# Patient Record
Sex: FEMALE | Race: WHITE | NOT HISPANIC OR LATINO | Employment: OTHER | URBAN - METROPOLITAN AREA
[De-identification: names, ages, dates, MRNs, and addresses within clinical notes are randomized per-mention and may not be internally consistent; named-entity substitution may affect disease eponyms.]

---

## 2018-11-15 DIAGNOSIS — F02.80 LATE ONSET ALZHEIMER'S DISEASE WITHOUT BEHAVIORAL DISTURBANCE (HCC): Primary | ICD-10-CM

## 2018-11-15 DIAGNOSIS — G30.1 LATE ONSET ALZHEIMER'S DISEASE WITHOUT BEHAVIORAL DISTURBANCE (HCC): Primary | ICD-10-CM

## 2018-11-15 RX ORDER — MEMANTINE HYDROCHLORIDE 10 MG/1
10 TABLET ORAL 2 TIMES DAILY
Qty: 180 TABLET | Refills: 1 | Status: SHIPPED | OUTPATIENT
Start: 2018-11-15 | End: 2019-05-18 | Stop reason: SDUPTHER

## 2018-11-15 NOTE — TELEPHONE ENCOUNTER
Please check if patient has had any recent blood work done  Needs CBC and CMP given the medications that she is currently taking

## 2018-11-16 DIAGNOSIS — G30.1 LATE ONSET ALZHEIMER'S DISEASE WITHOUT BEHAVIORAL DISTURBANCE (HCC): Primary | ICD-10-CM

## 2018-11-16 DIAGNOSIS — F02.80 LATE ONSET ALZHEIMER'S DISEASE WITHOUT BEHAVIORAL DISTURBANCE (HCC): Primary | ICD-10-CM

## 2018-11-16 NOTE — TELEPHONE ENCOUNTER
Asking for script for blood work to be faxed to stephanie GODOY  [address] [place] [address] at [phone] and  Gwendolyn Mack will take her to have drawn  Please enter order into epic

## 2019-05-18 DIAGNOSIS — G30.1 LATE ONSET ALZHEIMER'S DISEASE WITHOUT BEHAVIORAL DISTURBANCE (HCC): ICD-10-CM

## 2019-05-18 DIAGNOSIS — F02.80 LATE ONSET ALZHEIMER'S DISEASE WITHOUT BEHAVIORAL DISTURBANCE (HCC): ICD-10-CM

## 2019-05-20 RX ORDER — MEMANTINE HYDROCHLORIDE 10 MG/1
TABLET ORAL
Qty: 180 TABLET | Refills: 1 | Status: ON HOLD | OUTPATIENT
Start: 2019-05-20 | End: 2019-11-12 | Stop reason: SDUPTHER

## 2019-10-22 ENCOUNTER — HOSPITAL ENCOUNTER (INPATIENT)
Facility: HOSPITAL | Age: 69
LOS: 22 days | Discharge: HOME | DRG: 885 | End: 2019-11-13
Attending: EMERGENCY MEDICINE | Admitting: PSYCHIATRY & NEUROLOGY
Payer: MEDICARE

## 2019-10-22 DIAGNOSIS — G30.1 LATE ONSET ALZHEIMER'S DISEASE WITHOUT BEHAVIORAL DISTURBANCE (HCC): ICD-10-CM

## 2019-10-22 DIAGNOSIS — K21.9 GASTROESOPHAGEAL REFLUX DISEASE WITHOUT ESOPHAGITIS: ICD-10-CM

## 2019-10-22 DIAGNOSIS — E53.8 B12 DEFICIENCY: ICD-10-CM

## 2019-10-22 DIAGNOSIS — E46 MALNUTRITION (HCC): ICD-10-CM

## 2019-10-22 DIAGNOSIS — F02.80 LATE ONSET ALZHEIMER'S DISEASE WITHOUT BEHAVIORAL DISTURBANCE (HCC): ICD-10-CM

## 2019-10-22 DIAGNOSIS — R63.4 WEIGHT LOSS: ICD-10-CM

## 2019-10-22 DIAGNOSIS — F32.3 MDD (MAJOR DEPRESSIVE DISORDER), SINGLE EPISODE, SEVERE WITH PSYCHOTIC FEATURES (HCC): ICD-10-CM

## 2019-10-22 DIAGNOSIS — F33.3 MAJOR DEPRESSIVE DISORDER, RECURRENT, SEVERE WITH PSYCHOTIC FEATURES (HCC): Primary | Chronic | ICD-10-CM

## 2019-10-22 DIAGNOSIS — Z13.30 ENCOUNTER FOR BEHAVIORAL HEALTH SCREENING: ICD-10-CM

## 2019-10-22 DIAGNOSIS — I10 HYPERTENSION: ICD-10-CM

## 2019-10-22 DIAGNOSIS — E55.9 VITAMIN D DEFICIENCY: ICD-10-CM

## 2019-10-22 DIAGNOSIS — E03.9 HYPOTHYROIDISM: ICD-10-CM

## 2019-10-22 LAB
25(OH)D3 SERPL-MCNC: 19.9 NG/ML (ref 30–100)
AMPHETAMINES SERPL QL SCN: NEGATIVE
ANION GAP SERPL CALCULATED.3IONS-SCNC: 6 MMOL/L (ref 4–13)
BARBITURATES UR QL: NEGATIVE
BASOPHILS # BLD AUTO: 0.1 THOUSANDS/ΜL (ref 0–0.1)
BASOPHILS NFR BLD AUTO: 1 % (ref 0–2)
BENZODIAZ UR QL: NEGATIVE
BILIRUB UR QL STRIP: NEGATIVE
BUN SERPL-MCNC: 15 MG/DL (ref 7–25)
CALCIUM SERPL-MCNC: 10 MG/DL (ref 8.6–10.5)
CHLORIDE SERPL-SCNC: 106 MMOL/L (ref 98–107)
CLARITY UR: CLEAR
CO2 SERPL-SCNC: 28 MMOL/L (ref 21–31)
COCAINE UR QL: NEGATIVE
COLOR UR: YELLOW
CREAT SERPL-MCNC: 0.73 MG/DL (ref 0.6–1.2)
EOSINOPHIL # BLD AUTO: 0 THOUSAND/ΜL (ref 0–0.61)
EOSINOPHIL NFR BLD AUTO: 0 % (ref 0–5)
ERYTHROCYTE [DISTWIDTH] IN BLOOD BY AUTOMATED COUNT: 12.7 % (ref 11.5–14.5)
ETHANOL SERPL-MCNC: <10 MG/DL
FOLATE SERPL-MCNC: 12 NG/ML (ref 3.1–17.5)
GFR SERPL CREATININE-BSD FRML MDRD: 83 ML/MIN/1.73SQ M
GLUCOSE SERPL-MCNC: 110 MG/DL (ref 65–99)
GLUCOSE UR STRIP-MCNC: NEGATIVE MG/DL
HCT VFR BLD AUTO: 40.4 % (ref 42–47)
HGB BLD-MCNC: 13.6 G/DL (ref 12–16)
HGB UR QL STRIP.AUTO: NEGATIVE
KETONES UR STRIP-MCNC: NEGATIVE MG/DL
LEUKOCYTE ESTERASE UR QL STRIP: NEGATIVE
LYMPHOCYTES # BLD AUTO: 1.1 THOUSANDS/ΜL (ref 0.6–4.47)
LYMPHOCYTES NFR BLD AUTO: 18 % (ref 21–51)
MCH RBC QN AUTO: 32.7 PG (ref 26–34)
MCHC RBC AUTO-ENTMCNC: 33.6 G/DL (ref 31–37)
MCV RBC AUTO: 97 FL (ref 81–99)
METHADONE UR QL: NEGATIVE
MONOCYTES # BLD AUTO: 0.4 THOUSAND/ΜL (ref 0.17–1.22)
MONOCYTES NFR BLD AUTO: 7 % (ref 2–12)
NEUTROPHILS # BLD AUTO: 4.3 THOUSANDS/ΜL (ref 1.4–6.5)
NEUTS SEG NFR BLD AUTO: 74 % (ref 42–75)
NITRITE UR QL STRIP: NEGATIVE
OPIATES UR QL SCN: NEGATIVE
PCP UR QL: NEGATIVE
PH UR STRIP.AUTO: 5.5 [PH]
PLATELET # BLD AUTO: 202 THOUSANDS/UL (ref 149–390)
PMV BLD AUTO: 9.2 FL (ref 8.6–11.7)
POTASSIUM SERPL-SCNC: 3.6 MMOL/L (ref 3.5–5.5)
PROT UR STRIP-MCNC: NEGATIVE MG/DL
RBC # BLD AUTO: 4.15 MILLION/UL (ref 3.9–5.2)
SODIUM SERPL-SCNC: 140 MMOL/L (ref 134–143)
SP GR UR STRIP.AUTO: >=1.03 (ref 1–1.03)
THC UR QL: POSITIVE
TSH SERPL DL<=0.05 MIU/L-ACNC: 3.32 UIU/ML (ref 0.45–5.33)
UROBILINOGEN UR QL STRIP.AUTO: 1 E.U./DL
VIT B12 SERPL-MCNC: 293 PG/ML (ref 100–900)
WBC # BLD AUTO: 5.9 THOUSAND/UL (ref 4.8–10.8)

## 2019-10-22 PROCEDURE — 36415 COLL VENOUS BLD VENIPUNCTURE: CPT | Performed by: EMERGENCY MEDICINE

## 2019-10-22 PROCEDURE — 81003 URINALYSIS AUTO W/O SCOPE: CPT | Performed by: EMERGENCY MEDICINE

## 2019-10-22 PROCEDURE — 99285 EMERGENCY DEPT VISIT HI MDM: CPT

## 2019-10-22 PROCEDURE — 84443 ASSAY THYROID STIM HORMONE: CPT | Performed by: EMERGENCY MEDICINE

## 2019-10-22 PROCEDURE — 82607 VITAMIN B-12: CPT | Performed by: FAMILY MEDICINE

## 2019-10-22 PROCEDURE — 85025 COMPLETE CBC W/AUTO DIFF WBC: CPT | Performed by: EMERGENCY MEDICINE

## 2019-10-22 PROCEDURE — 80320 DRUG SCREEN QUANTALCOHOLS: CPT | Performed by: EMERGENCY MEDICINE

## 2019-10-22 PROCEDURE — 96372 THER/PROPH/DIAG INJ SC/IM: CPT

## 2019-10-22 PROCEDURE — 82746 ASSAY OF FOLIC ACID SERUM: CPT | Performed by: FAMILY MEDICINE

## 2019-10-22 PROCEDURE — 93005 ELECTROCARDIOGRAM TRACING: CPT

## 2019-10-22 PROCEDURE — 82306 VITAMIN D 25 HYDROXY: CPT | Performed by: FAMILY MEDICINE

## 2019-10-22 PROCEDURE — 80048 BASIC METABOLIC PNL TOTAL CA: CPT | Performed by: EMERGENCY MEDICINE

## 2019-10-22 PROCEDURE — 80307 DRUG TEST PRSMV CHEM ANLYZR: CPT | Performed by: EMERGENCY MEDICINE

## 2019-10-22 PROCEDURE — 84134 ASSAY OF PREALBUMIN: CPT | Performed by: FAMILY MEDICINE

## 2019-10-22 RX ORDER — CITALOPRAM 20 MG/1
20 TABLET ORAL DAILY
Status: DISCONTINUED | OUTPATIENT
Start: 2019-10-23 | End: 2019-10-22

## 2019-10-22 RX ORDER — LEVOTHYROXINE SODIUM 88 UG/1
88 TABLET ORAL
Status: DISCONTINUED | OUTPATIENT
Start: 2019-10-23 | End: 2019-11-13 | Stop reason: HOSPADM

## 2019-10-22 RX ORDER — LOSARTAN POTASSIUM 50 MG/1
25 TABLET ORAL DAILY
Status: DISCONTINUED | OUTPATIENT
Start: 2019-10-22 | End: 2019-10-26

## 2019-10-22 RX ORDER — MAGNESIUM HYDROXIDE/ALUMINUM HYDROXICE/SIMETHICONE 120; 1200; 1200 MG/30ML; MG/30ML; MG/30ML
30 SUSPENSION ORAL EVERY 4 HOURS PRN
Status: DISCONTINUED | OUTPATIENT
Start: 2019-10-22 | End: 2019-11-13 | Stop reason: HOSPADM

## 2019-10-22 RX ORDER — OLANZAPINE 5 MG/1
5 TABLET ORAL EVERY 8 HOURS PRN
Status: DISCONTINUED | OUTPATIENT
Start: 2019-10-22 | End: 2019-11-02

## 2019-10-22 RX ORDER — IBUPROFEN 400 MG/1
400 TABLET ORAL EVERY 8 HOURS PRN
Status: DISCONTINUED | OUTPATIENT
Start: 2019-10-22 | End: 2019-11-13 | Stop reason: HOSPADM

## 2019-10-22 RX ORDER — MEMANTINE HYDROCHLORIDE 5 MG/1
10 TABLET ORAL 2 TIMES DAILY
Status: DISCONTINUED | OUTPATIENT
Start: 2019-10-22 | End: 2019-11-13 | Stop reason: HOSPADM

## 2019-10-22 RX ORDER — PANTOPRAZOLE SODIUM 40 MG/1
40 TABLET, DELAYED RELEASE ORAL
Status: DISCONTINUED | OUTPATIENT
Start: 2019-10-23 | End: 2019-10-24

## 2019-10-22 RX ORDER — MIRTAZAPINE 15 MG/1
7.5 TABLET, FILM COATED ORAL
Status: DISCONTINUED | OUTPATIENT
Start: 2019-10-22 | End: 2019-10-22

## 2019-10-22 RX ORDER — TRAZODONE HYDROCHLORIDE 50 MG/1
50 TABLET ORAL
Status: DISCONTINUED | OUTPATIENT
Start: 2019-10-22 | End: 2019-11-13 | Stop reason: HOSPADM

## 2019-10-22 RX ORDER — HALOPERIDOL 1 MG/1
1 TABLET ORAL 3 TIMES DAILY
COMMUNITY
End: 2019-11-13 | Stop reason: HOSPADM

## 2019-10-22 RX ORDER — RISPERIDONE 1 MG/1
1 TABLET, FILM COATED ORAL EVERY 6 HOURS PRN
Status: DISCONTINUED | OUTPATIENT
Start: 2019-10-22 | End: 2019-10-23

## 2019-10-22 RX ORDER — CITALOPRAM 10 MG/1
10 TABLET ORAL DAILY
Status: DISCONTINUED | OUTPATIENT
Start: 2019-10-23 | End: 2019-11-04

## 2019-10-22 RX ORDER — NICOTINE 21 MG/24HR
1 PATCH, TRANSDERMAL 24 HOURS TRANSDERMAL DAILY
Status: DISCONTINUED | OUTPATIENT
Start: 2019-10-23 | End: 2019-10-22

## 2019-10-22 RX ORDER — RISPERIDONE 0.5 MG/1
0.5 TABLET, ORALLY DISINTEGRATING ORAL EVERY 8 HOURS PRN
Status: DISCONTINUED | OUTPATIENT
Start: 2019-10-22 | End: 2019-10-23

## 2019-10-22 RX ORDER — PANTOPRAZOLE SODIUM 20 MG/1
40 TABLET, DELAYED RELEASE ORAL
Status: ON HOLD | COMMUNITY
End: 2019-11-12 | Stop reason: SDUPTHER

## 2019-10-22 RX ORDER — LEVOTHYROXINE SODIUM 0.03 MG/1
88 TABLET ORAL
COMMUNITY
End: 2019-11-13 | Stop reason: HOSPADM

## 2019-10-22 RX ORDER — ACETAMINOPHEN 325 MG/1
975 TABLET ORAL EVERY 6 HOURS PRN
Status: DISCONTINUED | OUTPATIENT
Start: 2019-10-22 | End: 2019-11-13 | Stop reason: HOSPADM

## 2019-10-22 RX ORDER — HYDROXYZINE HYDROCHLORIDE 25 MG/1
25 TABLET, FILM COATED ORAL EVERY 4 HOURS PRN
Status: DISCONTINUED | OUTPATIENT
Start: 2019-10-22 | End: 2019-11-02

## 2019-10-22 RX ORDER — LORAZEPAM 0.5 MG/1
0.5 TABLET ORAL 2 TIMES DAILY PRN
COMMUNITY
Start: 2019-09-19 | End: 2019-11-13 | Stop reason: HOSPADM

## 2019-10-22 RX ORDER — LORAZEPAM 2 MG/ML
1 INJECTION INTRAMUSCULAR EVERY 4 HOURS PRN
Status: DISCONTINUED | OUTPATIENT
Start: 2019-10-22 | End: 2019-11-02

## 2019-10-22 RX ORDER — IBUPROFEN 200 MG
200 TABLET ORAL EVERY 8 HOURS PRN
Status: DISCONTINUED | OUTPATIENT
Start: 2019-10-22 | End: 2019-11-13 | Stop reason: HOSPADM

## 2019-10-22 RX ORDER — CITALOPRAM 20 MG/1
20 TABLET ORAL DAILY
COMMUNITY
End: 2019-11-13 | Stop reason: HOSPADM

## 2019-10-22 RX ORDER — OLANZAPINE 10 MG/1
5 INJECTION, POWDER, LYOPHILIZED, FOR SOLUTION INTRAMUSCULAR EVERY 8 HOURS PRN
Status: DISCONTINUED | OUTPATIENT
Start: 2019-10-22 | End: 2019-11-02

## 2019-10-22 RX ORDER — NITROFURANTOIN MACROCRYSTALS 100 MG/1
100 CAPSULE ORAL 2 TIMES DAILY
COMMUNITY
End: 2019-11-13 | Stop reason: HOSPADM

## 2019-10-22 RX ORDER — HALOPERIDOL 0.5 MG/1
1 TABLET ORAL ONCE
Status: DISCONTINUED | OUTPATIENT
Start: 2019-10-22 | End: 2019-10-22

## 2019-10-22 RX ORDER — HALOPERIDOL 5 MG/ML
1 INJECTION INTRAMUSCULAR ONCE
Status: COMPLETED | OUTPATIENT
Start: 2019-10-22 | End: 2019-10-22

## 2019-10-22 RX ORDER — VALSARTAN 80 MG/1
80 TABLET ORAL DAILY
COMMUNITY
Start: 2018-10-18 | End: 2019-11-13 | Stop reason: HOSPADM

## 2019-10-22 RX ORDER — LORAZEPAM 0.5 MG/1
0.5 TABLET ORAL EVERY 6 HOURS PRN
Status: DISCONTINUED | OUTPATIENT
Start: 2019-10-22 | End: 2019-11-02

## 2019-10-22 RX ADMIN — LOSARTAN POTASSIUM 25 MG: 50 TABLET, FILM COATED ORAL at 18:00

## 2019-10-22 RX ADMIN — HALOPERIDOL LACTATE 1 MG: 5 INJECTION, SOLUTION INTRAMUSCULAR at 14:51

## 2019-10-22 RX ADMIN — MEMANTINE 10 MG: 5 TABLET ORAL at 21:06

## 2019-10-22 NOTE — ED NOTES
Pt becoming agitated  Refusing lunch  Attempted to feed patient  Pt spit food out , refusing to chew  Pt refusing to swallow medication  Physician aware  Sitter remains at bedside for patient safety        Javi Craven RN  10/22/19 5937

## 2019-10-22 NOTE — ED NOTES
No pre-cert required as Medicare A & B is primary  Crisis called [employer] ([employer]) at [phone] to inquire as to whether a COB/pre-cert is needed w/ this plan as it is secondary  It appears this is a supplemental plan and will follow along w/ Medicare, however, I have not yet received a call back for final confirmation

## 2019-10-22 NOTE — ED NOTES
Patient is accepted at [address]  Patient is accepted by Tata Franz, [address] per Rambo Melendez Specialist      Patient may go to the floor once room is available and RN report is received  Nurse report is to be called to ext [phone] prior to patient transfer

## 2019-10-22 NOTE — PROGRESS NOTES
Patient suitcase in laundry room per [facility]  Avoid in first note where suitcase is

## 2019-10-22 NOTE — ED NOTES
Family members asking for water to give patient her ativan  Pt is calm and sitting on the bed following directions  Advised family to please wait until physician comes to examine patient        Camilla Sacnhez RN  10/22/19 3011

## 2019-10-22 NOTE — ED NOTES
302, ekg, POA, SNF paperwork faxed to intake for review/bed consideration for [facility]

## 2019-10-22 NOTE — ED NOTES
Pt resting with family at bedside  Crisis worker Jessenia Bowman at bedside updating family on plan of care        Irasema Smith RN  10/22/19 2088

## 2019-10-22 NOTE — PROGRESS NOTES
Patient arrived via wheelchair from [facility] ED  Patient affect flat and mood is anxious and fearful  Patient stated several times, "I'm scared  Reassured patient she is safe on the unit and 1:1 with patient as patient wants to continues ambulating unit leaning to right side  Obtained 1:1 order from [address] [place] [address] for patient's poor safety awareness and high fall risk  Alert and oriented to self  Patient calm and redirectable at this time  Currently eating dinner with assistance  Poor appetite  Patient admitted for noncompliance and aggressive behaviors  Medications haldol might have made behaviors worse  At [facility] patient is hyperverbal and bright  Mood is tearful, fearful, depressed, and anxious  1:1 maintained  Monitoring continues

## 2019-10-22 NOTE — ED NOTES
Pt presents w/ her  and son due to a change in mental status  Pt hs hx of dementia and is not oriented to anything but her name and family  Pt's  and son at bedside for evaluation  Per  pt has been having hysterical crying episodes and making statements about wanting to die and wanting to kill herself, both while at home prior to her SNF admission last month, and while at the SNF  Pt was seen by a psychiatrist at the SNF and started on Haldol which seems to have calmed the hysterical screaming, paranoia and some of the crying but has caused several other adverse reactions  Pt is now flat affected, emotionless and distant at this time and is nearly non-verbal  Pt did have a crying episode while this assessment was being done, however, pt remained non-verbal and looked fearful  Pt's gait is unsteady and described by the SNF staff as zombie-like  Pt's  and son state at baseline one month ago, prior to Haldol, pt was very verbal (although sometimes what she said made no sense), pt was bright and was able to walk unassisted and carry on conversations at most times (this base-line was confirmed by SNF staff as well)  Pt also experiencing a decrease in appetite since Haldol was started and has lost about 4-5 lbs in the 2 weeks  Pt's  is POA and is in agreement w/ I/P psych admission for stabilization and treatment  Pt's  made aware that the pt will need to be a 2 physician 302 due to her inability to understand her situation and inability to sign voluntary paperwork due to same  Pt's [address] Rights were read, with no understanding on the pt's part  A copy of Rights along w/ a copy of the Bill of Rights were placed on pt's chart

## 2019-10-22 NOTE — ED NOTES
Per physician Dr Juan F craft to let patient take home dose of ativan 0 5mg given by pts spouse        Michelle Cortés RN  10/22/19 2697

## 2019-10-22 NOTE — PROGRESS NOTES
Patient came in with the following:    Mint green sweater  2 pr  Blue jeans  Black pants  turq pants  Navy long sleeve top  Maroon long sleeve top  Blue/white stripe top  Grey night gown with hearts  Hair brush   Gold ring    [place]:   grey sneakers with laces  [place] jacket  Black underwire bra  Alarm ankle bracelet  White socks    Animal print suitcase filled with misc   Clothes in tub room

## 2019-10-22 NOTE — H&P
Wilton Amado#  [id number]  [id number]:[id number]    TFN:[id number]  Adm Date: 10/22/2019 4660  9:26 AM   ATT PHY: Carolyn Ledesma, [facility]         Chief Complaint:  Worsening depression with history of dementia    History of Presenting Illness: Fernando Mathis is a(n) 68y o  year old female who was admitted through the emergency department where she presented from [facility]  It was reported that patient was increasingly confused with crying episodes and worsening anxiety and depression  I spoke with patient's  Kelsey Angelo who is patient's POA  He reported that this all started after she was put on Haldol  Patient has longstanding history of dementia which was diagnosed by Dr Sonia Marcial neurologist   Reportedly patient had 5 lb weight loss over the past 3 weeks  On examination at bedside patient appears confused, unable to comprehend most of the questions  Allergies   Allergen Reactions    Donepezil GI Intolerance    Erythromycin Hives       No current facility-administered medications on file prior to encounter        Current Outpatient Medications on File Prior to Encounter   Medication Sig Dispense Refill    citalopram (CeleXA) 20 mg tablet Take 20 mg by mouth daily      haloperidol (HALDOL) 1 mg tablet Take 1 mg by mouth 3 (three) times a day      levothyroxine (SYNTHROID) 25 mcg tablet 88 mcg      memantine (NAMENDA) 10 mg tablet TAKE 1 TABLET BY MOUTH TWICE A  tablet 1    nitrofurantoin (MACRODANTIN) 100 mg capsule Take 100 mg by mouth 2 (two) times a day      pantoprazole (PROTONIX) 20 mg tablet Take 40 mg by mouth      valsartan (DIOVAN) 80 mg tablet Take 80 mg by mouth daily       LORazepam (ATIVAN) 0 5 mg tablet Take 0 5 mg by mouth 2 (two) times a day as needed          Active Ambulatory Problems     Diagnosis Date Noted    No Active Ambulatory Problems     Resolved Ambulatory Problems     Diagnosis Date Noted    No Resolved Ambulatory Problems     Past Medical History:   Diagnosis Date    [facility]'s disease ([facility] )     Anorexia     Anxiety     Arthritis     Colitis     Depression     DJD (degenerative joint disease)     Frontotemporal dementia ([facility] )     Gait abnormality     Hypertension     Hypothyroidism     Weight loss        Past Surgical History:   Procedure Laterality Date    BREAST LUMPECTOMY      CATARACT EXTRACTION, BILATERAL         Social History:   Social History     Socioeconomic History    Marital status: /Civil Union     Spouse name: None    Number of children: None    Years of education: None    Highest education level: None   Occupational History    None   Social Needs    Financial resource strain: None    Food insecurity:     Worry: None     Inability: None    Transportation needs:     Medical: None     Non-medical: None   Tobacco Use    Smoking status: Never Smoker    Smokeless tobacco: Never Used   Substance and Sexual Activity    Alcohol use: Never     Frequency: Never    Drug use: Never    Sexual activity: None   Lifestyle    Physical activity:     Days per week: None     Minutes per session: None    Stress: None   Relationships    Social connections:     Talks on phone: None     Gets together: None     Attends Taoist service: None     Active member of club or organization: None     Attends meetings of clubs or organizations: None     Relationship status: None    Intimate partner violence:     Fear of current or ex partner: None     Emotionally abused: None     Physically abused: None     Forced sexual activity: None   Other Topics Concern    None   Social History Narrative    None       Family History:   Family History   Problem Relation Age of Onset    Stroke Family     Hypertension Family     Rheumatologic disease Family     Dementia Maternal Aunt        Review of Systems   Musculoskeletal: Positive for arthralgias, back pain and gait problem  Psychiatric/Behavioral: Positive for agitation, behavioral problems and confusion  All other systems reviewed and are negative  Physical Exam   Constitutional: Awake and Alert  Well-developed and well-nourished  No distress  HENT: PERR,EOMI, conjunctiva normal  Head: Normocephalic and atraumatic  Mouth/Throat: Oropharynx is clear and moist     Eyes: Conjunctivae and EOM are normal  Pupils are equal, round, and reactive to light  Right and left eye exhibits no discharge  Neck: Neck supple  No tracheal deviation present  No thyromegaly present  Cardiovascular: Normal rate, regular rhythm and normal heart sounds  Exam reveals no friction rub  No murmur heard  Pulmonary/Chest: Effort normal and breath sounds normal  No respiratory distress  She has no wheezes  Abdominal: Soft  Bowel sounds are normal  She exhibits no distension  There is no tenderness  There is no rebound and no guarding  Neurological: Cranial Nerves grossly intact  No sensory deficit  Coordination normal    Musculoskeletal:   Nontender spine  Skin: Skin is warm and dry  No rash noted  No diaphoresis  No erythema  No edema  No cyanosis  Assessment     Issagerardo Garcia is a(n) 68y o  year old female with dementia with behavioral disturbance    1  Alzheimer's dementia with behavioral disturbance  Patient's dementia seems to be advanced  Patient's PET CT of the brain on 10/15/2015 showed suspicion for Lewy body disease  Patient's family is not aware of this diagnosis  Currently patient is on Namenda 10 mg b i d   2  Hypothyroidism  Patient is on levothyroxine  3  Cardiac with history of hypertension  Patient is on losartan  4  GERD  Patient is on Protonix  5  Anorexia/weight loss  Patient has lost 5 lb over the past 3 weeks  I have consulted Nutrition for evaluation and treatment  I will get prealbumin levels    I put the patient on Ensure nutritional supplements after the meals   6  Gait abnormality  PT OT has been consulted  7  DJD/osteoarthritis  Patient may get Tylenol on as needed basis  8  Major depressive disorder with history of Alzheimer's dementia  Patient is being managed by psych  Prognosis: fair  Discharge Plan: in progress  Advanced Directives: I have discussed in detail the patient the advanced directives  Patient's  Temo Krause is patient's POA and his phone number is [phone]  I spoke with him in detail  Patient also has a living will with advanced directives  When discussing cardiac and pulmonary resuscitation status with patient's  he informed me that patient wished to be DNR/DNI  I have spent more than 50 minutes gathering data, doing physical examination, and discussing the advanced directives, which was witnessed by caring staff

## 2019-10-22 NOTE — ED PROVIDER NOTES
History  Chief Complaint   Patient presents with    Altered Mental Status     family would like pt evaluated for her mental state      78-year-old female brought in by  and family for mental health evaluation  Over the past 5 weeks the patient's condition has been deteriorating  She was recently treated for UTI   denies any fevers, vomiting  Patient providing no history at this time  Patient is currently Haldol but  states this has not been effective  Altered Mental Status   Presenting symptoms: behavior changes and confusion    Severity:  Severe  Episode history:  Continuous  Progression:  Worsening  Context: recent infection    Context: not head injury and taking medications as prescribed    Associated symptoms: no difficulty breathing, no fever, no seizures, no slurred speech and no vomiting        Prior to Admission Medications   Prescriptions Last Dose Informant Patient Reported? Taking?    LORazepam (ATIVAN) 0 5 mg tablet Not Taking at Unknown time Outside Facility (Specify) Yes No   Sig: Take 0 5 mg by mouth 2 (two) times a day as needed    citalopram (CeleXA) 20 mg tablet 10/21/2019 at Unknown time  Yes Yes   Sig: Take 20 mg by mouth daily   haloperidol (HALDOL) 1 mg tablet Past Week at Unknown time  Yes Yes   Sig: Take 1 mg by mouth 3 (three) times a day   levothyroxine (SYNTHROID) 25 mcg tablet Past Week at Unknown time  Yes Yes   Si mcg   memantine (NAMENDA) 10 mg tablet Past Week at Unknown time  No Yes   Sig: TAKE 1 TABLET BY MOUTH TWICE A DAY   nitrofurantoin (MACRODANTIN) 100 mg capsule Past Week at Unknown time  Yes Yes   Sig: Take 100 mg by mouth 2 (two) times a day   pantoprazole (PROTONIX) 20 mg tablet Past Week at Unknown time  Yes Yes   Sig: Take 40 mg by mouth   valsartan (DIOVAN) 80 mg tablet Past Week at Unknown time Outside Facility (Specify) Yes Yes   Sig: Take 80 mg by mouth daily       Facility-Administered Medications: None       Past Medical History: Diagnosis Date    Alzheimer's disease ([facility] [address] )     Anxiety     Arthritis     Colitis     Depression     Hypertension     Hypothyroidism        Past Surgical History:   Procedure Laterality Date    BREAST LUMPECTOMY      CATARACT EXTRACTION, BILATERAL         Family History   Problem Relation Age of Onset    Stroke Family     Hypertension Family     Rheumatologic disease Family     Dementia Maternal Aunt      I have reviewed and agree with the history as documented  Social History     Tobacco Use    Smoking status: Never Smoker    Smokeless tobacco: Never Used   Substance Use Topics    Alcohol use: Never     Frequency: Never    Drug use: Never        Review of Systems   Constitutional: Negative for fever  Gastrointestinal: Negative for vomiting  Neurological: Negative for seizures  Psychiatric/Behavioral: Positive for confusion  Physical Exam  Physical Exam   Constitutional:  Non-toxic appearance  HENT:   Head: Normocephalic and atraumatic  Eyes: Pupils are equal, round, and reactive to light  EOM are normal  No scleral icterus  Neck: Normal range of motion  No neck rigidity  No tracheal deviation present  Cardiovascular: Normal rate and normal heart sounds  Pulmonary/Chest: Effort normal and breath sounds normal    Abdominal: Soft  She exhibits no distension  Musculoskeletal: Normal range of motion  She exhibits no edema  Neurological: Gait normal    She moves all 4 extremities without difficulty  No obvious focal neurological deficit  Skin: Skin is warm  Capillary refill takes less than 2 seconds  She is not diaphoretic  Psychiatric:   Anxious and withdrawn   Nursing note and vitals reviewed        Vital Signs  ED Triage Vitals [10/22/19 0930]   Temperature Pulse Respirations Blood Pressure SpO2   98 5 °F (36 9 °C) 75 18 143/70 100 %      Temp Source Heart Rate Source Patient Position - Orthostatic VS BP Location FiO2 (%)   Temporal Monitor Sitting Left arm -- Pain Score       No Pain           Vitals:    10/22/19 0930 10/22/19 1430 10/22/19 1615   BP: 143/70 157/87 (!) 183/90   Pulse: 75 82 66   Patient Position - Orthostatic VS: Sitting Sitting Sitting         Visual Acuity      ED Medications  Medications   haloperidol (HALDOL) tablet 1 mg (1 mg Oral Not Given 10/22/19 1452)   levothyroxine tablet 88 mcg (has no administration in time range)   nicotine (NICODERM CQ) 14 mg/24hr TD 24 hr patch 1 patch (has no administration in time range)   risperiDONE (RisperDAL M-TABS) dispersible tablet 0 5 mg (has no administration in time range)   risperiDONE (RisperDAL) tablet 1 mg (has no administration in time range)   OLANZapine (ZyPREXA) tablet 5 mg (has no administration in time range)   OLANZapine (ZyPREXA) IM injection 5 mg (has no administration in time range)   hydrOXYzine HCL (ATARAX) tablet 25 mg (has no administration in time range)   LORazepam (ATIVAN) tablet 0 5 mg (has no administration in time range)   LORazepam (ATIVAN) 2 mg/mL injection 1 mg (has no administration in time range)   traZODone (DESYREL) tablet 50 mg (has no administration in time range)   magnesium hydroxide (MILK OF MAGNESIA) 400 mg/5 mL oral suspension 30 mL (has no administration in time range)   aluminum-magnesium hydroxide-simethicone (MYLANTA) 200-200-20 mg/5 mL oral suspension 30 mL (has no administration in time range)   ibuprofen (MOTRIN) tablet 200 mg (has no administration in time range)   ibuprofen (MOTRIN) tablet 400 mg (has no administration in time range)   acetaminophen (TYLENOL) tablet 975 mg (has no administration in time range)   citalopram (CeleXA) tablet 10 mg (has no administration in time range)   losartan (COZAAR) tablet 25 mg (has no administration in time range)   pantoprazole (PROTONIX) EC tablet 40 mg (has no administration in time range)   haloperidol lactate (HALDOL) injection 1 mg (1 mg Intramuscular Given 10/22/19 1451)       Diagnostic Studies  Results Reviewed Procedure Component Value Units Date/Time    TSH [[id number]]  (Normal) Collected:  10/22/19 1034    Lab Status:  Final result Specimen:  Blood from Arm, Right Updated:  10/22/19 1131     TSH 3RD GENERATON 3 320 uIU/mL     Narrative:       Patients undergoing fluorescein dye angiography may retain small amounts of fluorescein in the body for 48-72 hours post procedure  Samples containing fluorescein can produce falsely depressed TSH values  If the patient had this procedure,a specimen should be resubmitted post fluorescein clearance  UA w Reflex to Microscopic w Reflex to Culture [[id number]]  (Abnormal) Collected:  10/22/19 1035    Lab Status:  Final result Specimen:  Urine, Clean Catch Updated:  10/22/19 1107     Color, UA Yellow     Clarity, UA Clear     Specific Gravity, UA >=1 030     pH, UA 5 5     Leukocytes, UA Negative     Nitrite, UA Negative     Protein, UA Negative mg/dl      Glucose, UA Negative mg/dl      Ketones, UA Negative mg/dl      Urobilinogen, UA 1 0 E U /dl      Bilirubin, UA Negative     Blood, UA Negative    Rapid drug screen, urine [[id number]]  (Abnormal) Collected:  10/22/19 1035    Lab Status:  Final result Specimen:  Urine, Clean Catch Updated:  10/22/19 1105     Amph/Meth UR Negative     Barbiturate Ur Negative     Benzodiazepine Urine Negative     Cocaine Urine Negative     Methadone Urine Negative     Opiate Urine Negative     PCP Ur Negative     THC Urine Positive    Narrative:       Presumptive report  If requested, specimen will be sent to reference lab for confirmation  FOR MEDICAL PURPOSES ONLY  IF CONFIRMATION NEEDED PLEASE CONTACT THE LAB WITHIN 5 DAYS  Drug Screen Cutoff Levels:  AMPHETAMINE/METHAMPHETAMINES  1000 ng/mL  BARBITURATES     200 ng/mL  BENZODIAZEPINES     200 ng/mL  COCAINE      300 ng/mL  METHADONE      300 ng/mL  OPIATES      300 ng/mL  PHENCYCLIDINE     25 ng/mL  THC       50 ng/mL    Presumptive report   If requested, specimen will be sent to reference lab for confirmation  FOR MEDICAL PURPOSES ONLY  IF CONFIRMATION NEEDED PLEASE CONTACT THE LAB WITHIN 5 DAYS      Drug Screen Cutoff Levels:  AMPHETAMINE/METHAMPHETAMINES  1000 ng/mL  BARBITURATES     200 ng/mL  BENZODIAZEPINES     200 ng/mL  COCAINE      300 ng/mL  METHADONE      300 ng/mL  OPIATES      300 ng/mL  PHENCYCLIDINE     25 ng/mL  THC       50 ng/mL      Basic metabolic panel [[id number]]  (Abnormal) Collected:  10/22/19 1034    Lab Status:  Final result Specimen:  Blood from Arm, Right Updated:  10/22/19 1102     Sodium 140 mmol/L      Potassium 3 6 mmol/L      Chloride 106 mmol/L      CO2 28 mmol/L      ANION GAP 6 mmol/L      BUN 15 mg/dL      Creatinine 0 73 mg/dL      Glucose 110 mg/dL      Calcium 10 0 mg/dL      eGFR 83 ml/min/1 73sq m     Narrative:       [place] guidelines for Chronic Kidney Disease (CKD):     Stage 1 with normal or high GFR (GFR > 90 mL/min/1 73 square meters)    Stage 2 Mild CKD (GFR = 60-89 mL/min/1 73 square meters)    Stage 3A Moderate CKD (GFR = 45-59 mL/min/1 73 square meters)    Stage 3B Moderate CKD (GFR = 30-44 mL/min/1 73 square meters)    Stage 4 Severe CKD (GFR = 15-29 mL/min/1 73 square meters)    Stage 5 End Stage CKD (GFR <15 mL/min/1 73 square meters)  Note: GFR calculation is accurate only with a steady state creatinine    Ethanol [[id number]]  (Normal) Collected:  10/22/19 1034    Lab Status:  Final result Specimen:  Blood from Arm, Right Updated:  10/22/19 1101     Ethanol Lvl <10 mg/dL     CBC and differential [[id number]]  (Abnormal) Collected:  10/22/19 1034    Lab Status:  Final result Specimen:  Blood from Arm, Right Updated:  10/22/19 1048     WBC 5 90 Thousand/uL      RBC 4 15 Million/uL      Hemoglobin 13 6 g/dL      Hematocrit 40 4 %      MCV 97 fL      MCH 32 7 pg      MCHC 33 6 g/dL      RDW 12 7 %      MPV 9 2 fL      Platelets 039 Thousands/uL      Neutrophils Relative 74 %      Lymphocytes Relative 18 % Monocytes Relative 7 %      Eosinophils Relative 0 %      Basophils Relative 1 %      Neutrophils Absolute 4 30 Thousands/µL      Lymphocytes Absolute 1 10 Thousands/µL      Monocytes Absolute 0 40 Thousand/µL      Eosinophils Absolute 0 00 Thousand/µL      Basophils Absolute 0 10 Thousands/µL                  No orders to display              Procedures  ECG 12 Lead Documentation Only  Date/Time: 10/22/2019 10:45 AM  Performed by: Marian Rincon DO  Authorized by: Marian Rincon DO     Patient location:  ED  Interpretation:     Interpretation: normal    Rate:     ECG rate:  65    ECG rate assessment: normal    Rhythm:     Rhythm: sinus rhythm    ST segments:     ST segments:  Normal  Comments:      No STEMI, nonischemic EKG           ED Course                               MDM  Number of Diagnoses or Management Options  Diagnosis management comments: On evaluation the does not seem to be an organic cause for patient's change in behavior  Seen by behavioral health and they are recommending medication adjustment  Patient is medically cleared for psychiatric admission         Amount and/or Complexity of Data Reviewed  Clinical lab tests: ordered and reviewed  Tests in the medicine section of CPT®: ordered and reviewed  Obtain history from someone other than the patient: yes  Review and summarize past medical records: yes  Independent visualization of images, tracings, or specimens: yes    Risk of Complications, Morbidity, and/or Mortality  Presenting problems: moderate  Diagnostic procedures: moderate  Management options: moderate    Patient Progress  Patient progress: stable      Disposition  Final diagnoses:   Encounter for behavioral health screening     Time reflects when diagnosis was documented in both MDM as applicable and the Disposition within this note     Time User Action Codes Description Comment    10/22/2019 11:38 AM Sheree Scott Add [Z13 30] Encounter for behavioral health screening     10/22/2019 2:54 PM KhaiHerbert singh Awasydnie Add [F32 3] MDD (major depressive disorder), single episode, severe with psychotic features [facility])       ED Disposition     ED Disposition Condition Date/Time Comment    Transfer to [address] [place] [address] Oct 22, 2019  3:09 PM Alysa Estrada should be transferred out to behavioral health and has been medically cleared  MD Documentation      Most Recent Value   Sending MD  Dr Genoveva Murguia MD      Follow-up Information    None         Current Discharge Medication List      CONTINUE these medications which have NOT CHANGED    Details   citalopram (CeleXA) 20 mg tablet Take 20 mg by mouth daily      haloperidol (HALDOL) 1 mg tablet Take 1 mg by mouth 3 (three) times a day      levothyroxine (SYNTHROID) 25 mcg tablet 88 mcg      memantine (NAMENDA) 10 mg tablet TAKE 1 TABLET BY MOUTH TWICE A DAY  Qty: 180 tablet, Refills: 1    Associated Diagnoses: Late onset Alzheimer's disease without behavioral disturbance (HCC)      nitrofurantoin (MACRODANTIN) 100 mg capsule Take 100 mg by mouth 2 (two) times a day      pantoprazole (PROTONIX) 20 mg tablet Take 40 mg by mouth      valsartan (DIOVAN) 80 mg tablet Take 80 mg by mouth daily       LORazepam (ATIVAN) 0 5 mg tablet Take 0 5 mg by mouth 2 (two) times a day as needed            No discharge procedures on file      ED Provider  Electronically Signed by           Silvino Wray DO  10/22/19 364 [facility]  10/22/19 7539

## 2019-10-22 NOTE — ED NOTES
Report given to [facility]  Pts bed being cleaned at this time will let ER know when ready        Sofiya Howard, RN  10/22/19 8765

## 2019-10-22 NOTE — PLAN OF CARE
Problem: Prexisting or High Potential for Compromised Skin Integrity  Goal: Skin integrity is maintained or improved  Description  INTERVENTIONS:  - Identify patients at risk for skin breakdown  - Assess and monitor skin integrity  - Assess and monitor nutrition and hydration status  - Monitor labs   - Assess for incontinence   - Turn and reposition patient  - Assist with mobility/ambulation  - Relieve pressure over bony prominences  - Avoid friction and shearing  - Provide appropriate hygiene as needed including keeping skin clean and dry  - Evaluate need for skin moisturizer/barrier cream  - Collaborate with interdisciplinary team   - Patient/family teaching  - Consider wound care consult   Outcome: Progressing     Problem: BEHAVIOR  Goal: Pt/Family maintain appropriate behavior and adhere to behavioral management agreement, if implemented  Description  INTERVENTIONS:  - Assess the family dynamic   - Encourage verbalization of thoughts and concerns in a socially appropriate manner  - Assess patient/family's coping skills and non-compliant behavior (including use of illegal substances)  - Utilize positive, consistent limit setting strategies supporting safety of patient, staff and others  - Initiate consult with Case Management, Spiritual Care or other ancillary services as appropriate  - If a patient's/visitor's behavior jeopardizes the safety of the patient, staff, or others, refer to organization procedure     - Notify Security of behavior or suspected illegal substances which indicate the need for search of the patient and/or belongings  - Encourage participation in the decision making process about a behavioral management agreement; implement if patient meets criteria  Outcome: Progressing     Problem: INVOLUNTARY ADMIT  Goal: Will cooperate with staff recommendations and doctor's orders and will demonstrate appropriate behavior  Description  INTERVENTIONS:  - Treat underlying conditions and offer medication as ordered  - Educate regarding involuntary admission procedures and rules  - Utilize positive consistent limit setting strategies to support patient and staff safety  Outcome: Progressing     Problem: Alteration in Thoughts and Perception  Goal: Agree to be compliant with medication regime, as prescribed and report medication side effects  Description  Interventions:  - Offer appropriate PRN medication and supervise ingestion; conduct AIMS, as needed   Outcome: Progressing  Goal: Complete daily ADLs, including personal hygiene independently, as able  Description  Interventions:  - Observe, teach, and assist patient with ADLS  - Monitor and promote a balance of rest/activity, with adequate nutrition and elimination   Outcome: Progressing     Problem: Alteration in Orientation  Goal: Interact with staff daily  Description  Interventions:  - Assess and re-assess patient's level of orientation  - Engage patient in 1 on 1 interactions, daily, for a minimum of 15 minutes   - Establish rapport/trust with patient   Outcome: Progressing  Goal: Allow medical examinations, as recommended  Description  Interventions:  - Provide physical/neurological exams and/or referrals, per provider   Outcome: Progressing  Goal: Cooperate with recommended testing/procedures  Description  Interventions:  - Determine need for ancillary testing  - Observe for mental status changes  - Implement falls/precaution protocol   Outcome: Progressing

## 2019-10-23 PROBLEM — F32.9 MAJOR DEPRESSIVE DISORDER: Status: ACTIVE | Noted: 2019-10-23

## 2019-10-23 PROBLEM — F02.81: Status: ACTIVE | Noted: 2019-10-23

## 2019-10-23 LAB
ATRIAL RATE: 65 BPM
P AXIS: 52 DEGREES
PR INTERVAL: 164 MS
PREALB SERPL-MCNC: 18.4 MG/DL (ref 18–40)
QRS AXIS: -22 DEGREES
QRSD INTERVAL: 80 MS
QT INTERVAL: 404 MS
QTC INTERVAL: 420 MS
T WAVE AXIS: 19 DEGREES
VENTRICULAR RATE: 65 BPM

## 2019-10-23 PROCEDURE — 97163 PT EVAL HIGH COMPLEX 45 MIN: CPT

## 2019-10-23 PROCEDURE — 93010 ELECTROCARDIOGRAM REPORT: CPT | Performed by: INTERNAL MEDICINE

## 2019-10-23 PROCEDURE — G8978 MOBILITY CURRENT STATUS: HCPCS

## 2019-10-23 PROCEDURE — G8989 SELF CARE D/C STATUS: HCPCS

## 2019-10-23 PROCEDURE — G8988 SELF CARE GOAL STATUS: HCPCS

## 2019-10-23 PROCEDURE — G8987 SELF CARE CURRENT STATUS: HCPCS

## 2019-10-23 PROCEDURE — G8979 MOBILITY GOAL STATUS: HCPCS

## 2019-10-23 PROCEDURE — 99222 1ST HOSP IP/OBS MODERATE 55: CPT | Performed by: PSYCHIATRY & NEUROLOGY

## 2019-10-23 PROCEDURE — 97166 OT EVAL MOD COMPLEX 45 MIN: CPT

## 2019-10-23 RX ORDER — MELATONIN
1000 DAILY
Status: DISCONTINUED | OUTPATIENT
Start: 2019-12-25 | End: 2019-11-13 | Stop reason: HOSPADM

## 2019-10-23 RX ORDER — CYANOCOBALAMIN 1000 UG/ML
1000 INJECTION INTRAMUSCULAR; SUBCUTANEOUS
Status: DISCONTINUED | OUTPATIENT
Start: 2019-10-23 | End: 2019-11-13 | Stop reason: HOSPADM

## 2019-10-23 RX ORDER — ERGOCALCIFEROL 1.25 MG/1
50000 CAPSULE ORAL WEEKLY
Status: DISCONTINUED | OUTPATIENT
Start: 2019-10-23 | End: 2019-11-13 | Stop reason: HOSPADM

## 2019-10-23 RX ORDER — TRAZODONE HYDROCHLORIDE 50 MG/1
25 TABLET ORAL 2 TIMES DAILY PRN
Status: DISCONTINUED | OUTPATIENT
Start: 2019-10-23 | End: 2019-10-30

## 2019-10-23 RX ORDER — AMANTADINE HYDROCHLORIDE 50 MG/5ML
50 SOLUTION ORAL DAILY
Status: DISCONTINUED | OUTPATIENT
Start: 2019-10-23 | End: 2019-10-23

## 2019-10-23 RX ORDER — AMANTADINE HYDROCHLORIDE 50 MG/5ML
50 SOLUTION ORAL DAILY
Status: DISCONTINUED | OUTPATIENT
Start: 2019-10-24 | End: 2019-10-25

## 2019-10-23 RX ADMIN — MEMANTINE 10 MG: 5 TABLET ORAL at 08:14

## 2019-10-23 RX ADMIN — LEVOTHYROXINE SODIUM 88 MCG: 88 TABLET ORAL at 05:33

## 2019-10-23 RX ADMIN — LORAZEPAM 0.5 MG: 0.5 TABLET ORAL at 20:07

## 2019-10-23 RX ADMIN — ERGOCALCIFEROL 50000 UNITS: 1.25 CAPSULE ORAL at 12:31

## 2019-10-23 RX ADMIN — TRAZODONE HYDROCHLORIDE 25 MG: 50 TABLET ORAL at 18:31

## 2019-10-23 RX ADMIN — MEMANTINE 10 MG: 5 TABLET ORAL at 17:24

## 2019-10-23 RX ADMIN — TRAZODONE HYDROCHLORIDE 25 MG: 50 TABLET ORAL at 12:31

## 2019-10-23 RX ADMIN — LOSARTAN POTASSIUM 25 MG: 50 TABLET, FILM COATED ORAL at 08:14

## 2019-10-23 RX ADMIN — CYANOCOBALAMIN 1000 MCG: 1000 INJECTION, SOLUTION INTRAMUSCULAR at 12:32

## 2019-10-23 RX ADMIN — CITALOPRAM HYDROBROMIDE 10 MG: 10 TABLET ORAL at 08:14

## 2019-10-23 RX ADMIN — PANTOPRAZOLE SODIUM 40 MG: 40 TABLET, DELAYED RELEASE ORAL at 05:33

## 2019-10-23 NOTE — PROGRESS NOTES
10/23/19 0947   Team Meeting   Meeting Type Daily Rounds   Initial Conference Date 10/23/19   Patient/Family Present   Patient Present No   Patient's Family Present No     Daily Psychiatric Rounds    Team Members Present:    Sen Simmons MD  [facility], SINA Santiago, SIDDHARTH Whittaker, MS,NCC,LPC  [place], [place]  Grace Cazares, RN  Ana Maria Rowley, RN    Discussion:     New admit reviewed  2 doctor 302  Maintained on continuous observation for safety and falls risk  Poor impulse control  Confusion/disorientation  From SNF, change in mental status following recent start of Haldol at facility  Haldol now discontinued  Medication compliant at this time  CM to look into scheduled a family meeting  Will review med regimen

## 2019-10-23 NOTE — TREATMENT PLAN
TREATMENT PLAN REVIEW - [facility] JANINE Torrepoli 68 y o  1950 female MRN: [id number]    [address] [facility]  Room / Bed: [place] [address] Encounter: [id number]          Admit Date/Time:  10/22/2019  9:26 AM    Treatment Team: Attending Provider: Calvin Millan MD; Consulting Physician: Mena Lopez MD; Patient Care Technician: Sánchez Piña; Patient Care Assistant: Figueroa Puentes; Certified Nursing Assistant: Lauren Arrington; Certified Nursing Assistant: Juan Francisco Leslie; Care Manager: Mary Sheehan RN; Recreational Therapist: Keagan Lange; Patient Care Assistant: Desire Cherylene Arts;  Patient Care Assistant: Breezy Thakkar; Patient Care Assistant: Nicole Burgos; Certified Nursing Assistant: Vilma Todd    Diagnosis: Principal Problem:    Major neurocognitive disorder, due to multiple etiologies, with behavioral disturbance, severe ([address] )  Active Problems:    Major depressive disorder      Patient Strengths/Assets: compliant with medication, family ties, well educated    Patient Barriers/Limitations: impaired cognition, patient is on an involuntary commitment, poor insight    Short Term Goals: decrease in depressive symptoms, decrease in psychotic symptoms, improvement in appetite, mood stabilization    Long Term Goals: stabilization of mood, free of suicidal thoughts, able to express basic needs, stable living arrangements upon discharge    Progress Towards Goals: initial treatment plan    Recommended Treatment: medication management, patient medication education, group therapy, milieu therapy, continued Behavioral Health psychiatric evaluation/assessment process    Treatment Frequency: daily medication monitoring, group and milieu therapy daily, monitoring through interdisciplinary rounds, monitoring through weekly patient care conferences    Expected Discharge Date:  21 midnights    Discharge Plan: will be determined    Treatment Plan Created/Updated By: Bravo Ragnel MD

## 2019-10-23 NOTE — PLAN OF CARE
Problem: PHYSICAL THERAPY ADULT  Goal: Performs mobility at highest level of function for planned discharge setting  See evaluation for individualized goals  Description  Treatment/Interventions: Functional transfer training, LE strengthening/ROM, Therapeutic exercise, Cognitive reorientation, Patient/family training, Equipment eval/education, Gait training, Spoke to nursing, Spoke to case management  Equipment Recommended: (TBD)       See flowsheet documentation for full assessment, interventions and recommendations  Note:   Prognosis: Guarded  Problem List: Impaired balance, Decreased cognition, Decreased safety awareness  Assessment: Pt is 68 y o  female seen for PT evaluation on 10/23/2019 s/p admit to [address] on 10/22/2019 w/ altered mental status  PT consulted to assess pt's functional mobility and d/c needs  Order placed for PT eval and tx  Performed at least 2 patient identifiers during session: Name and wristband  Comorbidities affecting pt's physical performance at time of assessment include: Alzheimer's dementia, hypothyroidism, HTN, GERD, anorexia/weight loss, gait abnormality, DJD/OA, major depressive disorder  PTA, pt was long term resident of SNF  Personal factors affecting pt at time of IE include: inability to navigate level surfaces w/o external assistance, unable to perform dynamic tasks in community, decreased cognition, decreased initiation and engagement, unable to perform physical activity, limited insight into impairments, inability to perform IADLs and inability to perform ADLs  Please find objective findings from PT assessment regarding body systems outlined above with impairments and limitations including weakness, impaired balance, decreased activity tolerance, decreased functional mobility tolerance, decreased safety awareness, fall risk and decreased cognition, as well as mobility assessment (need for cueing for mobility technique)   The following objective measures performed on IE also reveal limitations: Barthel Index: 45/100  Pt's clinical presentation is currently unstable/unpredictable seen in pt's presentation of advanced dementia with limited command following and ongoing medical assessment  Pt to benefit from continued PT tx to address deficits as defined above and maximize level of functional independent mobility and consistency  From PT/mobility standpoint, recommendation at time of d/c would be return to SNF, pending progress in order to facilitate return to PLOF  Barriers to Discharge: Other (Comment)(pt resident of SNF per chart)     Recommendation: Long-term skilled nursing home placement(return to SNF)     PT - OK to Discharge: No    See flowsheet documentation for full assessment

## 2019-10-23 NOTE — OCCUPATIONAL THERAPY NOTE
Occupational Therapy Evaluation      Blaise Manjarrez    10/23/2019    There is no problem list on file for this patient  Past Medical History:   Diagnosis Date    [facility] ([facility]     Anorexia     Anxiety     Arthritis     Colitis     Depression     DJD (degenerative joint disease)     Frontotemporal dementia ([facility]     Gait abnormality     Hypertension     Hypothyroidism     Weight loss        Past Surgical History:   Procedure Laterality Date    BREAST LUMPECTOMY      CATARACT EXTRACTION, BILATERAL          10/23/19 1222   Note Type   Note type Eval only   Restrictions/Precautions   Weight Bearing Precautions Per Order No   Other Precautions 1:1;Cognitive; Impulsive; Fall Risk   Pain Assessment   Pain Assessment FLACC   Pain Rating: FLACC (Rest) - Face 0   Pain Rating: FLACC (Rest) - Legs 0   Pain Rating: FLACC (Rest) - Activity 0   Pain Rating: FLACC (Rest) - Cry 0   Pain Rating: FLACC (Rest) - Consolability 0   Score: FLACC (Rest) 0   Pain Rating: FLACC (Activity) - Face 0   Pain Rating: FLACC (Activity) - Legs 0   Pain Rating: FLACC (Activity) - Activity 0   Pain Rating: FLACC (Activity) - Cry 0   Pain Rating: FLACC (Activity) - Consolability 0   Score: FLACC (Activity) 0   Home Living   Type of Home SNF   Additional Comments per chart review: "Pt's  and son state at baseline one month ago, prior to Haldol, pt was very verbal (although sometimes what she said made no sense), pt was bright and was able to walk unassisted and carry on conversations at most times (this base-line was confirmed by SNF staff as well)"   Prior Function   Level of Walker Needs assistance with ADLs and functional mobility; Needs assistance with IADLs   Lives With Facility staff   Falls in the last 6 months   (unknown)   Comments pt is poor historian, PLOF and social history obtained from pt's chart   Lifestyle   Autonomy Per chart review, patient lives in a SNF and recieves assistance with ADLs/IADLs  ADL   Eating Assistance 3  Moderate Assistance   Grooming Assistance 3  Moderate Assistance   UB Bathing Assistance 3  Moderate Assistance   LB Bathing Assistance 2  Maximal Assistance   UB Dressing Assistance 3  Moderate Assistance   LB Dressing Assistance 2  Maximal Assistance   Toileting Assistance  2  Maximal Assistance   Bed Mobility   Additional Comments Pt OOB and walking the hallways prior to eval    Transfers   Sit to Stand   (CGA)   Additional items Assist x 1; Impulsive;Verbal cues   Stand to Sit   (CGA)   Additional items Assist x 1; Impulsive;Verbal cues   Functional Mobility   Functional Mobility   (CGA)   Additional items Hand hold assistance   Balance   Static Sitting Good   Dynamic Sitting Fair   Static Standing Fair -   Dynamic Standing Fair -   Activity Tolerance   Activity Tolerance Treatment limited secondary to medical complications (Comment)   Nurse Made Aware RN verbalized pt appropriate for therapy and made aware of outcomes/recs    RUE Assessment   RUE Assessment WFL   LUE Assessment   LUE Assessment WFL   Hand Function   Gross Motor Coordination Functional   Fine Motor Coordination Functional   Vision-Basic Assessment   Patient Visual Report   (pt reported she is able to see people standing in hallway)   Cognition   Overall Cognitive Status Impaired   Arousal/Participation Alert   Attention Difficulty attending to directions   Orientation Level Oriented to person; Unable to assess  (pt c minimal verbalizations)   Memory Unable to assess   Following Commands Unable to follow one step commands   Assessment   Assessment Pt is a 68 y o  female who was admitted to [address] [place] [address] on 10/22/2019 with altered mental status  At this time, patient is also affected by the comorbidities of: Alzheimer's disease, anorexia, anxiety, arthritis, colitis, depression, DJD, frontotemporal dementia, HTN, and hypothyroidism   Additionally, patient is affected by the following personal factors:difficulty performing ADLS, difficulty performing IADLS  and decreased initiation and engagement   Prior to admission, Per chart review, patient lives in a SNF and recieves assistance with ADLs/IADLs  Upon OT evaluation, patient requires moderate assist for UB ADLs and maximum assist for LB ADLs  Patient presents with functional use of BUEs and intact muscle strength  Patient unable to follow step-commands  The patient provides minimal verbal responses  Patient appears to be functioning at baseline for ADLs/IADLs  Will sign off, D/C OT and from OT standpoint, recommendation at time of d/c would be Long-term SNF  Please re-consult if further immediate skilled OT needs are warranted      Goals   Patient Goals none expressed as pt primarily non verbal   Recommendation   OT Discharge Recommendation Other (Comment)  (SNF)   OT - OK to Discharge Yes  (Once medically cleared)   Barthel Index   Feeding 5   Bathing 0   Grooming Score 0   Dressing Score 0   Bladder Score 5   Bowels Score 5   Toilet Use Score 5   Transfers (Bed/Chair) Score 10   Mobility (Level Surface) Score 10   Stairs Score 0   Barthel Index Score 40     Natalie Camacho, OT

## 2019-10-23 NOTE — H&P
Psychiatric Evaluation - Behavioral Health     Identification Data:Kat Amado 68 y o  female MRN: [id number]  Unit/Bed#: [facility] 203-02 Encounter: [id number]    Chief Complaint: depression, hallucinations and agitation    History of Present Illness     Chiki Hill is a 68 y o  female with a history of depression and cognitive disorder who was admitted to the inpatient adult psychiatric unit on a involuntary 302 commitment basis due to unstable mood, visual hallucinations and disorganized behavior  Patient presented with her  and son to ED because of change of mental status and worsening of agitation   reported patient has been crying hysterically and making statements about wanting to die and kill herself prior to her SNF admission last month  Patient was seen by a psychiatrist at the SNF and was  started on Haldol which seemed to have calmed the hysterical screaming, paranoia and crying spells but has caused several other adverse reaction  Patient is now flat affect and emotionless and nearly non-verbal   and son stated that at baseline 1 month ago, patient was verbal (although some time what she said made no sense), patient was bright and was able to walk unassisted and carrying on conversation at most times  Patient also experiences decrease in appetite and has has lost about 4-5 lb in the past 2 weeks  On evaluation in the inpatient psychiatric unit Mariella Pond is noted to be visibly on the unit, continually pacing with assistant  She is noted to lean to the right side with ambulation and her pace of gait pretty quick  Patient is minimally interactive with others and noted to be quiet, internally preoccupied and stating "I am scared"  Patient is also noted to have possible visual hallucination as she is seen picking up things in the air while pacing in the unit  Patient is unable to provide any meaningful information due to advanced stage of cognitive function decline  As her , patient began to exhibit cognitive and behavioral changes over 10 years ago  Initial changes were pt not able to perform her usual tasks, gradual speech impairment and mood changes  Patient was 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  Patient also has a PET CT of the brain done in 2015 which showed suspicion for Lewy body neurocognitive disorder  Psychiatric Review Of Systems:    Sleep changes: decreased  Appetite changes:decreased   Weight changes: yes  Energy: decreased  Interest/pleasure/: decreased  Anhedonia: yes  Anxiety: yes  Mary: no  Guilt:  yes  Hopeless:  no  Self injurious behavior/risky behavior: no  Suicidal ideation: no  Homicidal ideation: no  Auditory hallucinations: no  Visual hallucinations: yes, she is noted to picking up unseeing things in the unit    Delusional thinking: no  Eating disorder history: no  Obsessive/compulsive symptoms: no    Historical Information     Past Psychiatric History:     Past Inpatient Psychiatric Treatment:   No history of past inpatient psychiatric admissions  Past Outpatient Psychiatric Treatment:    Was seen by psychiatrist at [facility]  Past Suicide Attempts: no  Past Violent Behavior: none reported  Past Psychiatric Medication Trials: Aricept, Haldol, Celexa, Namenda, Ativan     Substance Abuse History:    Social History     Tobacco History     Smoking Status  Never Smoker    Smokeless Tobacco Use  Never Used          Alcohol History     Alcohol Use Status  Never          Drug Use     Drug Use Status  Never          Sexual Activity     Sexually Active  Not Asked          Activities of Daily Living    Not Asked                   Alcohol use: none  Recreational drug use: none    Family Psychiatric History:     Reported  H/o cognitive disorder      Social History:    Education: College education  Marital History:   Children: 5 adult children  Living Arrangement: Pt was in SNF for the past month  Occupational History: retired  Functioning Relationships: good support system  Legal History: none   History: None    Traumatic History:   None reported    Past Medical History:      Past Medical History:   Diagnosis Date    [facility] )     Anorexia     Anxiety     Arthritis     Colitis     Depression     DJD (degenerative joint disease)     [facility]poral dementia ([facility] )     Gait abnormality     Hypertension     Hypothyroidism     Weight loss      Past Surgical History:   Procedure Laterality Date    BREAST LUMPECTOMY      CATARACT EXTRACTION, BILATERAL         Medical Review Of Systems:    Pertinent items are noted in HPI  Allergies: Allergies   Allergen Reactions    Donepezil GI Intolerance    Erythromycin Hives       Medications: All current active medications have been reviewed      OBJECTIVE:    Vital signs in last 24 hours:    Temp:  [98 1 °F (36 7 °C)-99 7 °F (37 6 °C)] 98 1 °F (36 7 °C)  HR:  [66-82] 79  Resp:  [18] 18  BP: (138-183)/(82-90) 159/88      Intake/Output Summary (Last 24 hours) at 10/23/2019 1408  Last data filed at 10/23/2019 1306  Gross per 24 hour   Intake 560 ml   Output    Net 560 ml        Mental Status Evaluation:    Appearance:  age appropriate   Behavior:  restless   Speech:  scant, poverty of speech   Mood:  anxious   Affect:  blunted   Language: aphasia  Yes partial   Thought Process:  decreased rate of thoughts   Associations: thought blocking   Thought Content:  some paranoia   Perceptual Disturbances: visual hallucinations   Risk Potential: Suicidal ideation - None  Homicidal ideation - None  Potential for aggression - Yes, due to agitation   Sensorium:  oriented to person   Memory:  recent and remote memory: unable to assess due to lack of cooperation   Consciousness:  alert and awake   Attention: poor concentration and poor attention span   Intellect: decreased   Fund of Knowledge: awareness of current events: limited Insight:  poor   Judgment: poor   Muscle Strength Muscle Tone: normal  normal   Gait/Station: unstable gait   Motor Activity: Mild hand tremor and rigidity/EPS noted in wrist and elbow are        Laboratory Results:   I have personally reviewed all pertinent laboratory/tests results  Imaging Studies:   No results found  Code Status: Level 3 - DNAR and DNI  Advance Directive and Living Will: <no information>    Assessment/Plan   Principal Problem:    Major neurocognitive disorder, due to multiple etiologies, with behavioral disturbance, severe (HCC)  Active Problems:    Major depressive disorder      Patient Strengths: compliant with medication, family ties, good support system, supportive family     Patient Barriers: impaired cognition, unable to express basic needs    Treatment Plan:     Planned Treatment and Medication Changes: All current active medications have been reviewed  Encourage group therapy, milieu therapy and occupational therapy  Behavioral Health checks every 7 minutes      Risks / Benefits of Treatment:    Pt is unable to understand at due to her advance cognitive decline   was informed about meds risks and benefits  Counseling / Coordination of Care:    Patient's presentation on admission and proposed treatment plan discussed with treatment team   Diagnosis, medication changes and treatment plan reviewed with patient      Inpatient Psychiatric Certification:    Estimated length of stay: 20 midnights      Austin Lewis MD 10/23/19

## 2019-10-23 NOTE — PROGRESS NOTES
The patient continues to be pacing and restless, continuously ambulating on the unit with quick pace upon reassessment and successive rounds following administration of as needed trazodone  Continual observation continues to monitor for behaviors, safety, high fall risk, poor insight and safety awareness

## 2019-10-23 NOTE — PROGRESS NOTES
[id number] [id number] team met to review PT TX plan, goals, and strengths  All in agreement and signed

## 2019-10-23 NOTE — PROGRESS NOTES
Maintain 1:1 observation due to poor safety awareness and unpredictable behaviors  Patient pacing the hallways with 1:1 observer  Patient ate HS snack with prompting  Upon approach patient's mood and affect is flat and anxious  Patient is confused alert to self only  When trying to speak to patient, patient is non verbal and emotionless  Calm and cooperative  Unable to assess SI/HI/depression  Patient does look internally preoccupied at times  No behavioral issues or acting act  Took medications without difficulty  Will continue to monitor safety and behaviors every 7 minutes

## 2019-10-23 NOTE — PROGRESS NOTES
Maintain 1:1 observation due to poor safety awareness and unpredictable behaviors  Patient sleeping without difficulty  Patient up to the BR once  Needs a lot of redirection  No behavioral issues  Able to fall back to sleep  Will continue to monitor safety and behaviors

## 2019-10-23 NOTE — ED NOTES
Crisis received a call back from Rashid at [facility] ([facility]), Mike confirmed this policy is supplemental to Medicare and therefore follows along w/ Medicare, COB/Pre-cert required

## 2019-10-23 NOTE — PHYSICAL THERAPY NOTE
Physical Therapy Evaluation     Patient's Name: Jaswinder Khoury    Admitting Diagnosis  Altered mental status [R41 82]  MDD (major depressive disorder), single episode, severe with psychotic features ([facility] ) [F32 3]  Encounter for behavioral health screening [Z13 30]    Problem List  There is no problem list on file for this patient  Past Medical History  Past Medical History:   Diagnosis Date    Alzheimer's disease ([facility]     Anorexia     Anxiety     Arthritis     Colitis     Depression     DJD (degenerative joint disease)     [facility]     Gait abnormality     Hypertension     Hypothyroidism     Weight loss        Past Surgical History  Past Surgical History:   Procedure Laterality Date    BREAST LUMPECTOMY      CATARACT EXTRACTION, BILATERAL            10/23/19 1221   Note Type   Note type Eval/Treat   Pain Assessment   Pain Assessment No/denies pain   Pain Rating: FLACC (Rest) - Face 0   Pain Rating: FLACC (Rest) - Legs 0   Pain Rating: FLACC (Rest) - Activity 0   Pain Rating: FLACC (Rest) - Cry 0   Pain Rating: FLACC (Rest) - Consolability 0   Score: FLACC (Rest) 0   Pain Rating: FLACC (Activity) - Face 0   Pain Rating: FLACC (Activity) - Legs 0   Pain Rating: FLACC (Activity) - Activity 0   Pain Rating: FLACC (Activity) - Cry 0   Pain Rating: FLACC (Activity) - Consolability 0   Score: FLACC (Activity) 0   Home Living   Type of Home SNF   Additional Comments per chart review: "Pt's  and son state at baseline one month ago, prior to Haldol, pt was very verbal (although sometimes what she said made no sense), pt was bright and was able to walk unassisted and carry on conversations at most times (this base-line was confirmed by SNF staff as well)"   Prior Function   Level of [place] Needs assistance with ADLs and functional mobility; Needs assistance with IADLs   Lives With Facility staff   Falls in the last 6 months   (unknown)   Comments pt is poor historian, PLOF and social history obtained from pt's chart   Restrictions/Precautions   Weight Bearing Precautions Per Order No   Other Precautions 1:1;Cognitive; Impulsive; Fall Risk   General   Family/Caregiver Present No   Cognition   Overall Cognitive Status Impaired   Arousal/Participation Alert  (pt pacing halls, minimally redirectable)   Orientation Level Oriented to person; Unable to assess  (pt c minimal verbalizations)   Memory Unable to assess   Following Commands Unable to follow one step commands   RLE Assessment   RLE Assessment   (grossly at least 3/5 observed c mobility)   LLE Assessment   LLE Assessment   (grossly at least 3/5 observed c mobility)   Coordination   Movements are Fluid and Coordinated 0   Sensation   (unable to formally assess)   Bed Mobility   Supine to Sit   (pt received amb in hallway c nsg staff)   Transfers   Sit to Stand 4  Minimal assistance  (CGA)   Additional items Assist x 1; Impulsive;Verbal cues   Stand to Sit 4  Minimal assistance  (CGA)   Additional items Assist x 1; Impulsive;Verbal cues   Ambulation/Elevation   Gait pattern Improper Weight shift;Decreased foot clearance;Shuffling; Short stride; Ataxia  (lateral lean towards R)   Gait Assistance 4  Minimal assist   Additional items Assist x 1;Verbal cues; Tactile cues   Assistive Device   (HHA)   Distance > 500 ft; pt per RN report has been walking all morning   Stair Management Assistance Not tested   Balance   Static Sitting Good   Dynamic Sitting Fair   Static Standing Fair -   Dynamic Standing Fair -   Ambulatory Fair -   Endurance Deficit   Endurance Deficit Yes   Activity Tolerance   Activity Tolerance Treatment limited secondary to medical complications (Comment)   Nurse Made Aware Yes, RN verbalized pt appropriate for PT session   Assessment   Prognosis Guarded   Problem List Impaired balance;Decreased cognition;Decreased safety awareness   Assessment Pt is 68 y o  female seen for PT evaluation on 10/23/2019 s/p [facility] on 10/22/2019 w/ altered mental status  PT consulted to assess pt's functional mobility and d/c needs  Order placed for PT eval and tx  Performed at least 2 patient identifiers during session: Name and wristband  Comorbidities affecting pt's physical performance at time of assessment include: Alzheimer's dementia, hypothyroidism, HTN, GERD, anorexia/weight loss, gait abnormality, DJD/OA, major depressive disorder  PTA, pt was long term resident of SNF  Personal factors affecting pt at time of IE include: inability to navigate level surfaces w/o external assistance, unable to perform dynamic tasks in community, decreased cognition, decreased initiation and engagement, unable to perform physical activity, limited insight into impairments, inability to perform IADLs and inability to perform ADLs  Please find objective findings from PT assessment regarding body systems outlined above with impairments and limitations including weakness, impaired balance, decreased activity tolerance, decreased functional mobility tolerance, decreased safety awareness, fall risk and decreased cognition, as well as mobility assessment (need for cueing for mobility technique)  The following objective measures performed on IE also reveal limitations: Barthel Index: 45/100  Pt's clinical presentation is currently unstable/unpredictable seen in pt's presentation of advanced dementia with limited command following and ongoing medical assessment  Pt to benefit from continued PT tx to address deficits as defined above and maximize level of functional independent mobility and consistency  From PT/mobility standpoint, recommendation at time of d/c would be return to SNF, pending progress in order to facilitate return to PLOF     Barriers to Discharge Other (Comment)  (pt resident of SNF per chart)   Goals   Patient Goals none expressed as pt primarily non verbal   STG Expiration Date 11/12/19   Short Term Goal #1 In 15-20 days: Increase bilateral LE strength 1/2 grade to facilitate independent mobility, Perform all bed mobility tasks modified independent to decrease caregiver burden, Perform all transfers modified independent to improve independence, Ambulate > 500 ft  with least restrictive assistive device modified independent w/o LOB and w/ normalized gait pattern 100% of the time, Increase all balance 1/2 grade to decrease risk for falls and PT provider will perform functional balance assessment to determine fall risk   PT Treatment Day 0   Plan   Treatment/Interventions Functional transfer training;LE strengthening/ROM; Therapeutic exercise;Cognitive reorientation;Patient/family training;Equipment eval/education;Gait training;Spoke to nursing;Spoke to case management   PT Frequency Monitor status   Recommendation   Recommendation Long-term skilled nursing home placement  (return to SNF)   Equipment Recommended   (TBD)   PT - OK to Discharge No   Barthel Index   Feeding 5   Bathing 0   Grooming Score 0   Dressing Score 5   Bladder Score 5   Bowels Score 5   Toilet Use Score 5   Transfers (Bed/Chair) Score 10   Mobility (Level Surface) Score 10   Stairs Score 0   Barthel Index Score 45         Margo Turner PT

## 2019-10-23 NOTE — PROGRESS NOTES
Progress Note - Dany Chambers 68 y o  female MRN: [id number]    Unit/Bed#[facility] 203-02 Encounter: [id number]        Subjective:   Patient seen and examined at bedside after reviewing the chart and discussing the case with the caring staff  On examination patient has no acute issues  On review of patient's labs it was found that patient's vitamin B12 levels were low 293 and vitamin-D levels were also 19 9  Physical Exam   Vitals: Blood pressure 159/88, pulse 79, temperature 98 1 °F (36 7 °C), temperature source Temporal, resp  rate 18, height 5' 1" (1 549 m), weight 58 7 kg (129 lb 6 6 oz), SpO2 96 %  ,Body mass index is 24 45 kg/m²  Constitutional: He appears well-developed  HEENT: PERR, EOMI, MMM  Cardiovascular: Normal rate and regular rhythm  Pulmonary/Chest: Effort normal and breath sounds normal    Abdomen: Soft, + BS, NT  Neuro  Cranial nerve 2-12 grossly intact, mild cogwheel rigidity involving bilateral wrists    Assessment/Plan:  Dany Chambers is a(n) 68y o  year old female with dementia with behavioral disturbance     1  Alzheimer's dementia with behavioral disturbance  Patient's dementia seems to be advanced  Patient has shown acute decline with change in mental status  Patient's PET CT of the brain on 10/15/2015 showed suspicion for Lewy body disease  There was also a suspicion for Frontotemporal Dementia  Patient's family is not aware of these diagnosis  Currently patient is on Namenda 10 mg b i d   2  Hypothyroidism  Patient is on levothyroxine  3  Cardiac with history of hypertension  Patient is on losartan  4  GERD  Patient is on Protonix  5  Anorexia/weight loss  Patient has lost 5 lb over the past 3 weeks  I have consulted Nutrition for evaluation and treatment  I will get prealbumin levels  I put the patient on Ensure nutritional supplements after the meals  6  Gait abnormality  PT OT has been consulted  7  DJD/osteoarthritis    Patient may get Tylenol on as needed basis  8  New vitamin-D deficiency  I will put the patient on vitamin-D bolus doses for 12 weeks followed by vitamin D3 1000 units daily  9  New vitamin B12 deficiency  I will put the patient on monthly B12 injections  10  Major depressive disorder with history of Alzheimer's dementia  Patient is being managed by psych

## 2019-10-23 NOTE — SOCIAL WORK
Dr , CM, PT  and brother in law met for family meeting with regard to PT HX, psycho soc, presenting concerns, TX plan and medication management  All in agreement  PT  plans to visit sometime next week allowing PT transition period  CM informed of petiton for 303 hearing for ongoing care, reviewed PT rights and PT  in agreement to PT staying on unit for [address] at this time  CM provided PT  nurses station number and her office number for check in  Meeting concluded

## 2019-10-23 NOTE — PROGRESS NOTES
The patient noted to be visible on the unit, continually pacing on unit with assistance from continual observation attendant, Leif Sequeira, RN  The patient noted to lean to the right with ambulation and pace of gait quick  The patient noted to be alert to self, confused and disoriented to place time and situations  The patient appetite poor for breakfast meal due to patient restless and would not sit fo to eat breakfast meal  The patient appetite for lunch meal noted to be improved with assistance from clinical staff  The patient flat, intermittently noted to smile with initial interaction at the start of the shift, but overall noted to be flat, quiet, preoccupied, intermittently stating "I'm scared," and restless, but noted to become increasingly restless with irritable edge as shift progressed, ambulatory gait noted to increase, clinical staff attempted to provide redirection and distraction techniques and encouraged patient to rest, provided techniques ineffective  As needed trazodone administered at 1231 for restlessness as per physician's orders and Dr Sharla Nielsen recommendation to administer for continuous ambulation  The patient provides minimal verbal responses  The patient compliant with medication administration  No verbal or non-verbal complaints of pain noted, flacc score 0  Continual visual observation continues to monitor for behaviors, safety, poor insight, poor safety awareness and high fall risk due to gait ataxia and confusion  Will continue to monitor

## 2019-10-23 NOTE — PLAN OF CARE
Problem: Ineffective Coping  Goal: Participates in unit activities  Description  Interventions:  - Provide therapeutic environment   - Provide required programming   - Redirect inappropriate behaviors   Outcome: Not Progressing   Patient not able to tolerate groups; ambulating and having hallucinations

## 2019-10-24 PROCEDURE — 99232 SBSQ HOSP IP/OBS MODERATE 35: CPT | Performed by: PSYCHIATRY & NEUROLOGY

## 2019-10-24 RX ADMIN — LORAZEPAM 0.5 MG: 0.5 TABLET ORAL at 17:03

## 2019-10-24 RX ADMIN — LOSARTAN POTASSIUM 25 MG: 50 TABLET, FILM COATED ORAL at 08:38

## 2019-10-24 RX ADMIN — MEMANTINE 10 MG: 5 TABLET ORAL at 16:54

## 2019-10-24 RX ADMIN — MEMANTINE 10 MG: 5 TABLET ORAL at 08:38

## 2019-10-24 RX ADMIN — TRAZODONE HYDROCHLORIDE 25 MG: 50 TABLET ORAL at 15:30

## 2019-10-24 RX ADMIN — CITALOPRAM HYDROBROMIDE 10 MG: 10 TABLET ORAL at 08:38

## 2019-10-24 RX ADMIN — Medication 20 MG: at 12:41

## 2019-10-24 RX ADMIN — AMANTADINE HYDROCHLORIDE 50 MG: 50 SOLUTION ORAL at 20:33

## 2019-10-24 RX ADMIN — LEVOTHYROXINE SODIUM 88 MCG: 88 TABLET ORAL at 06:44

## 2019-10-24 NOTE — SOCIAL WORK
CM faxed 303 hearing documentation to [facility] with  [facility]DS to schedule, pending response

## 2019-10-24 NOTE — PROGRESS NOTES
Progress Note - 275 Leopoldo Amado 68 y o  female MRN: [id number]   Unit/Bed#: Chito Santiago 203-02 Encounter: [id number]    Behavior over the last 24 hours: minimal improvement  Laura Adams was seen today, appears less scared and getting adjusted to the unit  She continues pacing in the unit but she was able to sit to eat her meal  She remains mostly quiet, guarded and appears to have + VH periodically  On exam mild Parkisnonism/EPS was noted on upper extremities  Pt needed prn meds Ativan and Trazodone that helped her with her anxiety and sleep  Staff report no participation in groups due to her advanced NCD  Pt will continue on 1:1 care while awake  Sleep: slept off and on  Appetite: fair  Medication side effects: no new meds were provided yet  ROS: does not answer  No acute focal neurological or change of MS noted  Mental Status Evaluation:    Appearance:  age appropriate   Behavior:  restless and fidgety, pacing a lot in the unit   Speech:  soft, impoverished   Mood:  anxious   Affect:  blunted   Thought Process:  decreased rate of thoughts   Associations: thought blocking   Thought Content:  poverty of thought   Perceptual Disturbances: +VH   Risk Potential: Suicidal ideation - None  Homicidal ideation - None   Sensorium:  oriented to person   Memory:  patient does not answer   Consciousness:  alert and awake   Attention: poor concentration and poor attention span   Insight:  impaired   Judgment: impaired   Gait/Station: unstable gait, leaning to right side   Motor Activity: abnormal movement noted: minimal hand tremor present     Vital signs in last 24 hours:    Temp:  [97 5 °F (36 4 °C)-98 9 °F (37 2 °C)] 98 9 °F (37 2 °C)  HR:  [66-82] 66  Resp:  [16-20] 16  BP: (140-149)/(82-93) 140/82    Laboratory results: I have personally reviewed all pertinent laboratory/tests results          Assessment/Plan   Principal Problem:    Major neurocognitive disorder, due to multiple etiologies, with behavioral disturbance, severe (HCC)  Active Problems:    Major depressive disorder  Vit D deficiency    Recommended Treatment:     Planned medication and treatment changes: All current active medications have been reviewed  Encourage group therapy, milieu therapy and occupational therapy  Behavioral Health checks every 7 minutes   Vit D was prescribed by Dr Ammy Pizarro  Pt will receive her first low dose of Amantidine 50 mg tonight for her mild parkinsonism/EPS  Current Facility-Administered Medications:  acetaminophen 975 mg Oral Q6H PRN Wells Ou, CRNP   aluminum-magnesium hydroxide-simethicone 30 mL Oral Q4H PRN Wells Ou, CRNP   amantadine 50 mg Oral Daily Dyan Carter MD   [START ON 12/25/2019] cholecalciferol 1,000 Units Oral Daily Glo Yan MD   citalopram 10 mg Oral Daily Wells Ou, CRNP   cyanocobalamin 1,000 mcg Intramuscular Q30 Days lGo Yan MD   ergocalciferol 50,000 Units Oral Weekly Glo Yan MD   hydrOXYzine HCL 25 mg Oral Q4H PRN Wells Ou, CRNP   ibuprofen 200 mg Oral Q8H PRN Wells Ou, CRNP   ibuprofen 400 mg Oral Q8H PRN Wells Ou, CRNP   levothyroxine 88 mcg Oral Early Morning Wells Ou, CRNP   LORazepam 1 mg Intramuscular Q4H PRN Wells Ou, CRNP   LORazepam 0 5 mg Oral Q6H PRN Wells Ou, CRNP   losartan 25 mg Oral Daily Glo Yan MD   magnesium hydroxide 30 mL Oral Daily PRN Wells Ou, CRNP   memantine 10 mg Oral BID Glo Yan MD   OLANZapine 5 mg Intramuscular Q8H PRN Wells Ou, CRNP   OLANZapine 5 mg Oral Q8H PRN Wells Ou, CRNP   omeprazole (PRILOSEC) suspension 2 mg/mL 20 mg Oral Daily Glo Yan MD   traZODone 25 mg Oral BID PRN Dyan Carter MD   traZODone 50 mg Oral HS PRN Wells Ou, CRNP       Risks / Benefits of Treatment:    Pt is unable to understand due to her advanced NCD   Family was informed about meds benefit and possible risks of side effects  Counseling / Coordination of Care:    Patient's progress discussed with staff in treatment team meeting  Medications, treatment progress and treatment plan reviewed with patient      Kenzie Reyes MD 10/24/19

## 2019-10-24 NOTE — PROGRESS NOTES
Documentation for [id number] shift  The patient maintained on continual observation for behaviors, safety, poor insight and safety awareness, gait instability, confusion and high fall risk  The patient noted to be ambulatory on the unit throughout the shift and dependent on clinical staff with ambulation due to patient leaning heavily to the right, patient exhibits no safety awareness during ambulation and keeps head down  The patient's gait is unsteady and at time the patient's pace quickens to a fast walk/jog pace  The patient noted to tolerate being wheelchair mobile with assistance from clinical staff for periods of time, but noted to become restless, fidgety and attempt to get up unassisted when movement ceases  The patient confused and disoriented x 4, restless and unable to follow direction provided by clinical staff due to confusion  The patient's verbal responses are minimal and confused  No verbal or non-verbal complaints of pain noted, flacc score 0  The patient compliant with medications administered crushed in applesauce  The patient dependent on clinical staff for meals, appetite for breakfast and lunch fair  Will continue to monitor

## 2019-10-24 NOTE — SOCIAL WORK
KATHLEEN placed call to Feli Arvizu with [address]   Potential time for 303 hearing will be 1 or 230pm  Wilson Adams will follow up with CM first thing in the am via email with contact information and those in attendance  Will be via phone

## 2019-10-24 NOTE — SOCIAL WORK
Cm met with PT for PT check in  PT hung head throughout conversation and did not make eye contact was flat in response  CM inquired if PT was comfortable and PT stated yes  CM inquired if there was anything PT needed, PT responded no  PT denies SI/HI/AH/VH  Oriented to self only

## 2019-10-24 NOTE — PLAN OF CARE
Problem: DEPRESSION  Goal: Will be euthymic at discharge  Description  INTERVENTIONS:  - Administer medication as ordered  - Provide emotional support via 1:1 interaction with staff  - Encourage involvement in milieu/groups/activities  - Monitor for social isolation  Outcome: Progressing     Problem: BEHAVIOR  Goal: Pt/Family maintain appropriate behavior and adhere to behavioral management agreement, if implemented  Description  INTERVENTIONS:  - Assess the family dynamic   - Encourage verbalization of thoughts and concerns in a socially appropriate manner  - Assess patient/family's coping skills and non-compliant behavior (including use of illegal substances)  - Utilize positive, consistent limit setting strategies supporting safety of patient, staff and others  - Initiate consult with Case Management, Spiritual Care or other ancillary services as appropriate  - If a patient's/visitor's behavior jeopardizes the safety of the patient, staff, or others, refer to organization procedure     - Notify Security of behavior or suspected illegal substances which indicate the need for search of the patient and/or belongings  - Encourage participation in the decision making process about a behavioral management agreement; implement if patient meets criteria  Outcome: Progressing     Problem: ANXIETY  Goal: Will report anxiety at manageable levels  Description  INTERVENTIONS:  - Administer medication as ordered  - Teach and encourage coping skills  - Provide emotional support  - Assess patient/family for anxiety and ability to cope  Outcome: Progressing  Goal: By discharge: Patient will verbalize 2 strategies to deal with anxiety  Description  Interventions:  - Identify any obvious source/trigger to anxiety  - Staff will assist patient in applying identified coping technique/skills  - Encourage attendance of scheduled groups and activities  Outcome: Progressing

## 2019-10-24 NOTE — SOCIAL WORK
CM placed call to Nora with [facility]  ext  [phone] to follow up on status of 303 hearing  America Jerome indicated that PT is a resident of [place] and [address] is Karissa Bautista at , that CM would need to contact her directly to schedule hearing  CM contacted Karissa Bautista whom requested for CM to fax 303 hearing paper work to , documentation faxed

## 2019-10-24 NOTE — NURSING NOTE
[phone]  Pt found to be sleeping on most of authors rounds  No acute behavioral issues noted  Q 7 min checks maintained  Fall protocol in place

## 2019-10-24 NOTE — PROGRESS NOTES
10/24/19 0949   Team Meeting   Meeting Type Daily Rounds   Team Members Present   Team Members Present Physician;Nurse;;; Occupational Therapist   Physician Team Member Dr Bravo Rangel MD; Sandor Radford, [address] [place] [address]    Nursing Team Member Noe Chong RN    Care Management Team Member Jennifer Haley [facility], [facility]   Social Work Team Member Mg Pitts [facility]    OT Team Member Marta Mejia [place]    Patient/Family Present   Patient Present No   Patient's Family Present No     High acuity; 1:1 continuous observation; confused; AOx1 (self); no insight, no safety awareness; prns utilized

## 2019-10-24 NOTE — PROGRESS NOTES
Progress Note - Lauren Cramer 68 y o  female MRN: [id number]    Unit/Bed#Darryle Barter 203-02 Encounter: [id number]        Subjective:   Patient seen and examined at bedside after reviewing the chart and discussing the case with the caring staff  On examination patient has no acute issues  Patient appears slightly more calm today  It still appears to have visual hallucinations  I have discussed patient's case with Dr Richard Abel psych, he will be putting the patient on amantadine with goal to improve patient's parkinsonism symptoms  On review of patient's labs it was found that patient's vitamin B12 levels were low 293 and vitamin-D levels were also 19 9  Physical Exam   Vitals: Blood pressure 140/82, pulse 66, temperature 98 9 °F (37 2 °C), temperature source Temporal, resp  rate 16, height 5' 1" (1 549 m), weight 58 7 kg (129 lb 6 6 oz), SpO2 96 %  ,Body mass index is 24 45 kg/m²  Constitutional: He appears well-developed  Patient has flat affect, unable to comprehend most of the questions  HEENT: PERR, EOMI, MMM  Cardiovascular: Normal rate and regular rhythm  Pulmonary/Chest: Effort normal and breath sounds normal    Abdomen: Soft, + BS, NT  Neuro  Cranial nerve 2-12 grossly intact, mild cogwheel rigidity involving bilateral wrists    Assessment/Plan:  Lauren Cramer is a(n) 68y o  year old female with dementia with behavioral disturbance     1  Alzheimer's dementia with behavioral disturbance  Patient's dementia seems to be advanced  Patient's PET CT of the brain on 10/15/2015 showed suspicion for Lewy body disease  Some of patient's symptoms including visual hallucinations and mild cogwheel rigidity a sign of parkinsonism supports that diagnosis  Patient will be started on Amantadine  There was also a suspicion for Frontotemporal Dementia with cerebral cortical atrophy  Patient's family is not aware of these diagnosis  Currently patient is on Namenda 10 mg b i d   2  Hypothyroidism  Patient is on levothyroxine  3  Cardiac with history of hypertension  Patient is on losartan  4  GERD  Patient is on Protonix  5  Anorexia/weight loss  Patient has lost 5 lb over the past 3 weeks  I have consulted Nutrition for evaluation and treatment  I will get prealbumin levels  I put the patient on Ensure nutritional supplements after the meals  6  Gait abnormality  PT OT has been consulted  7  DJD/osteoarthritis  Patient may get Tylenol on as needed basis  8  New vitamin-D deficiency  I will put the patient on vitamin-D bolus doses for 12 weeks followed by vitamin D3 1000 units daily  9  New vitamin B12 deficiency  I will put the patient on monthly B12 injections  10  Major depressive disorder with history of Alzheimer's dementia  Patient is being managed by psych

## 2019-10-24 NOTE — PROGRESS NOTES
[id number]  Patient is on 1:1 continual observation and was pushed around the unit by an MHT  Patient easily became restless if she wasn't continually pushed by a staff member  She ate 100 percent of dinner with encouragement and patience  After dinner, patient became tearful and was difficult to console  She was given PRN trazodone, which was minimally effective  She remained restless and anxious and was given Ativan around 2000  This medication was effective and patient was much less restless until bed time  Will continue monitoring

## 2019-10-24 NOTE — PROGRESS NOTES
[id number]  Patient remains on continual observation due to being a fall risk  She was restless, tearful, and difficult to console  Was given PRN trazodone, which was ineffective  Was later given PRN Ativan with scheduled Namenda as she remained restless and anxious  This seemed to be effective and patient was awake but calm throughout the rest of the evening  Appears comfortable and without pain  Will continue monitoring

## 2019-10-24 NOTE — PLAN OF CARE
PT is progressing, No D/C date at this time  Will return to [address] [facility] upon stabilization

## 2019-10-25 PROCEDURE — 97116 GAIT TRAINING THERAPY: CPT

## 2019-10-25 PROCEDURE — 99232 SBSQ HOSP IP/OBS MODERATE 35: CPT | Performed by: PSYCHIATRY & NEUROLOGY

## 2019-10-25 RX ORDER — AMANTADINE HYDROCHLORIDE 50 MG/5ML
50 SOLUTION ORAL 2 TIMES DAILY
Status: DISCONTINUED | OUTPATIENT
Start: 2019-10-25 | End: 2019-10-28

## 2019-10-25 RX ADMIN — LEVOTHYROXINE SODIUM 88 MCG: 88 TABLET ORAL at 06:39

## 2019-10-25 RX ADMIN — MEMANTINE 10 MG: 5 TABLET ORAL at 09:15

## 2019-10-25 RX ADMIN — LOSARTAN POTASSIUM 25 MG: 50 TABLET, FILM COATED ORAL at 09:15

## 2019-10-25 RX ADMIN — AMANTADINE HYDROCHLORIDE 50 MG: 50 SOLUTION ORAL at 21:24

## 2019-10-25 RX ADMIN — Medication 20 MG: at 09:16

## 2019-10-25 RX ADMIN — MEMANTINE 10 MG: 5 TABLET ORAL at 18:48

## 2019-10-25 RX ADMIN — CITALOPRAM HYDROBROMIDE 10 MG: 10 TABLET ORAL at 09:15

## 2019-10-25 NOTE — PROGRESS NOTES
Wliton Amado#  BXR:[birth year] F  BRX:[id number]    GWA:[id number]  Adm Date: 10/22/2019 4408  9:26 AM   ATT PHY: Anup Garcia, [address]         Subjective     The patient was seen after reviewing the chart and discussing the case with caring staff  Today during our encounter, the patient reported no acute concerns  She was largely non-verbal during our encounter  Objective     Vitals:    10/25/19 0700   BP: 154/71   Pulse: 82   Resp: 18   Temp: 98 2 °F (36 8 °C)   SpO2: 97%       General Appearance: Awake and Alert  No acute distress  HEENT: Normocephalic, atraumatic  PERRLA, EOMI, MMM  Heart: RRR, no murmurs  Normal S1 and S2   Lungs: CTA bilaterally with fair air entry  Assessment     Vish Amado is a(n) 68y o  year old female with dementia with behavioral disturbance     1  Alzheimer's dementia with behavioral disturbance   Patient's dementia seems to be advanced   Patient's PET CT of the brain on 10/15/2015 showed suspicion for Lewy body disease  Some of patient's symptoms including visual hallucinations and mild cogwheel rigidity a sign of parkinsonism supports that diagnosis  Patient has been started on low dose amantadine  There was also a suspicion for Frontotemporal Dementia with cerebral cortical atrophy   Currently patient is on Namenda 10 mg b i d  Patient is otherwise doing well with PT   2  Hypothyroidism   Patient is on levothyroxine  3  Cardiac with history of hypertension   Patient is on losartan  4  GERD   Patient is on Protonix  5  Anorexia/weight loss   Patient has lost 5 lb over the past 3 weeks   I have consulted Nutrition for evaluation and treatment   Prealbumin level within normal limits  I put the patient on Ensure nutritional supplements after the meals  6  Gait abnormality   PT/OT  7  DJD/osteoarthritis   Patient may get Tylenol on as needed basis  8  Vitamin-D deficiency   Vitamin-D bolus doses for 12 weeks followed by vitamin D3 1000 units daily  9  Vitamin B12 deficiency  Monthly B12 injections  The patient was discussed with Dr Mesfin Hwang and he is in agreement with the above note  The patient was discussed with Dr Mesfin Hwang and he is in agreement with the above note

## 2019-10-25 NOTE — PROGRESS NOTES
10/25/19 1002   Team Meeting   Meeting Type Daily Rounds   Team Members Present   Team Members Present Physician;Nurse;;; Occupational Therapist   Physician Team Member Dr Franki Dixon MD; Luz Elena [address]   Nursing Team Member John Sierra, [place]   Care Management Team Member Tere Nguyen MS, [facility]   Social Work Team Member Clarita Cooley, [place]   OT Team Member Jodee May, [place]   Patient/Family Present   Patient Present No   Patient's Family Present No     Fall risk, one to one, close proximity while awake, gait was poor, prominent lean when walking, likes to be moving  Medication compliant, appetite fair, enjoys ensure drink, PRN trazodone provided yesterday, intermittent period of hysterical crying  No D/C date at this time  PT has hearing for today, time to be determined

## 2019-10-25 NOTE — PROGRESS NOTES
Patient unable to participate in admission assessment due to level of dementia  She was with 1:1 for safety and responses were not appropriate  RT got all information on assessment from information the family provided to CM/MIKAL

## 2019-10-25 NOTE — PLAN OF CARE
Problem: PHYSICAL THERAPY ADULT  Goal: Performs mobility at highest level of function for planned discharge setting  See evaluation for individualized goals  Description  Treatment/Interventions: Functional transfer training, LE strengthening/ROM, Therapeutic exercise, Cognitive reorientation, Patient/family training, Equipment eval/education, Gait training, Spoke to nursing, Spoke to case management  Equipment Recommended: (TBD)       See flowsheet documentation for full assessment, interventions and recommendations  Outcome: Progressing  Note:   Prognosis: Guarded  Problem List: Impaired balance, Decreased cognition, Decreased safety awareness  Assessment: Pt seen for PT treatment session this date with interventions consisting of gait training w/ emphasis on improving pt's ability to ambulate level surfaces x ~1000 feet x 1 with CGA x 2 provided by therapist with no AD  Pt agreeable to PT treatment session upon arrival, pt found seated OOB in recliner, in no apparent distress  In comparison to previous session, pt with improvements in amb distance  Post session: pt returned back to recliner and all needs in reach 1:1 present  Continue to recommend return to SNF at time of d/c in order to maximize pt's functional independence and safety w/ mobility  Pt continues to be functioning below baseline level, and remains limited 2* factors listed above and including decreased strength & safe functional mobility  PT will continue to see pt while here in order to address the deficits listed above and provide interventions consistent w/ POC in effort to achieve STGs  Barriers to Discharge: (resides at [facility])     Recommendation: Long-term skilled nursing home placement(return to SNF)     PT - OK to Discharge: Yes(when med cleared to SNF)    See flowsheet documentation for full assessment

## 2019-10-25 NOTE — PROGRESS NOTES
Progress Note - [address] Leopoldo Amado 68 y o  female MRN: [id number]   Unit/Bed#: [facility] 203-02 Encounter: [id number]    Behavior over the last 24 hours: minimal improvement  Padilla Lewis was seen today, appears less care but remain anxious and tearful periodically  She continues pacing fast in the unit and staff monitor her behavior constantly due to fall risk  She is very poor historian due to her advance NCD  Staff report no participation in groups due to her NCD  Sleep: decreased  Appetite: poor  Medication side effects: still shows mild EPS  ROS: does not answer  No acute focal neurological or change mental status noted  Mental Status Evaluation:    Appearance:  age appropriate   Behavior:  restless and fidgety   Speech:  scant, disorganized   Mood:  anxious   Affect:  labile   Thought Process:  disorganized   Associations: incoherent   Thought Content:  poverty of thought   Perceptual Disturbances: visual hallucinations   Risk Potential: Suicidal ideation - None  Homicidal ideation - None   Sensorium:  oriented to person   Memory:  patient does not answer   Consciousness:  alert and awake   Attention: poor concentration and poor attention span   Insight:  impaired   Judgment: impaired   Gait/Station: unstable gait   Motor Activity: mild EPS     Vital signs in last 24 hours:    Temp:  [98 °F (36 7 °C)-98 2 °F (36 8 °C)] 98 2 °F (36 8 °C)  HR:  [62-82] 82  Resp:  [16-18] 18  BP: (129-154)/(71-93) 154/71    Laboratory results: I have personally reviewed all pertinent laboratory/tests results  Assessment/Plan   Principal Problem:    Major neurocognitive disorder, due to multiple etiologies, with behavioral disturbance, severe (HCC)  Active Problems:    Major depressive disorder    Recommended Treatment:     Planned medication and treatment changes:     All current active medications have been reviewed  Encourage group therapy, milieu therapy and occupational therapy  Paty Cowan checks every 7 minutes   Will increase Amantadine to 50 mg bid for EPS/Parkinsonism    Current Facility-Administered Medications:  acetaminophen 975 mg Oral Q6H PRN Wali Magyar, CRNP   aluminum-magnesium hydroxide-simethicone 30 mL Oral Q4H PRN Wali Magyar, CRNP   amantadine 50 mg Oral BID Mariela Cunningham MD   [START ON 12/25/2019] cholecalciferol 1,000 Units Oral Daily Alfredo Juan MD   citalopram 10 mg Oral Daily Wali Magyar, CRNP   cyanocobalamin 1,000 mcg Intramuscular Q30 Days Alfredo Juan MD   ergocalciferol 50,000 Units Oral Weekly Alfredo Juan MD   hydrOXYzine HCL 25 mg Oral Q4H PRN Wali Magyar, CRNP   ibuprofen 200 mg Oral Q8H PRN Wali Magyar, CRNP   ibuprofen 400 mg Oral Q8H PRN Wali Magyar, CRNP   levothyroxine 88 mcg Oral Early Morning Wali Magyar, CRNP   LORazepam 1 mg Intramuscular Q4H PRN Wali Magyar, CRNP   LORazepam 0 5 mg Oral Q6H PRN Wali Magyar, CRNP   losartan 25 mg Oral Daily Alfredo Juan MD   magnesium hydroxide 30 mL Oral Daily PRN Wali Magyar, CRNP   memantine 10 mg Oral BID Alfredo Juan MD   OLANZapine 5 mg Intramuscular Q8H PRN Wali Magyar, CRNP   OLANZapine 5 mg Oral Q8H PRN Wali Magyar, CRNP   omeprazole (PRILOSEC) suspension 2 mg/mL 20 mg Oral Daily Alfredo Juan MD   traZODone 25 mg Oral BID PRN Mariela Cunningham MD   traZODone 50 mg Oral HS PRN Wali Magyar, CRNP       Risks / Benefits of Treatment:    Pt is unable to understand due to advanced NCD  Counseling / Coordination of Care:    Patient's progress discussed with staff in treatment team meeting  Medications, treatment progress and treatment plan reviewed with patient      Ankita Salgado MD 10/25/19

## 2019-10-25 NOTE — SOCIAL WORK
CM spoke with PT son Yoselin Chen and provided him update on PT status   Reviewed visitation hours and that doctor would most likely be open to visit starting next week  Reviewed Medication regimine at this time  Reviewed PT status and TX plan  PT son thanked CM  CM provided contact information and encouraged PT son to reach out with any needs or concerns  Call ended mutually

## 2019-10-25 NOTE — PROGRESS NOTES
Maintain bed tab alarm due to poor safety awareness and gait weakness  Patient appears to be sleeping without difficulty throughout the night  No behavioral issues  No complaints or concerns  Will continue to monitor safety and behaviors every 7 minutes

## 2019-10-25 NOTE — SOCIAL WORK
CM placed call to [facility]  [phone], CM spoke with Bakari Cavazos and Bakari Cavazos reported that prior to start of Halodol PT was walking independently  Bakari Cavazos indicated PT does well with finger foods and is constantly pacing  PT came to facility on memory care unit on sept 12th from home  Bakari Cavazos further reported that PT screamed and paced constantly upon admission but with psych management they were able to calm her some but pacing continued, PT family is very supportive, PT sees Dr Ailyn Santillan whom visits her at the facility for medical and Dr Shalonda Ariza MD for psych on a biweekly basis  PT can return, may take report on status for return or may onsite  Bakari Cavazos indicated PT family was looking for review of medications for 2nd opinion and want the best for her  CM updated on PT status and TX plan, will follow up on weekly basis

## 2019-10-25 NOTE — PLAN OF CARE
Problem: Prexisting or High Potential for Compromised Skin Integrity  Goal: Skin integrity is maintained or improved  Description  INTERVENTIONS:  - Identify patients at risk for skin breakdown  - Assess and monitor skin integrity  - Assess and monitor nutrition and hydration status  - Monitor labs   - Assess for incontinence   - Turn and reposition patient  - Assist with mobility/ambulation  - Relieve pressure over bony prominences  - Avoid friction and shearing  - Provide appropriate hygiene as needed including keeping skin clean and dry  - Evaluate need for skin moisturizer/barrier cream  - Collaborate with interdisciplinary team   - Patient/family teaching  - Consider wound care consult   Outcome: Progressing     Problem: DEPRESSION  Goal: Will be euthymic at discharge  Description  INTERVENTIONS:  - Administer medication as ordered  - Provide emotional support via 1:1 interaction with staff  - Encourage involvement in milieu/groups/activities  - Monitor for social isolation  Outcome: Progressing     Problem: SLEEP DISTURBANCE  Goal: Will exhibit normal sleeping pattern  Description  Interventions:  -  Assess the patients sleep pattern, noting recent changes  - Administer medication as ordered  - Decrease environmental stimuli, including noise, as appropriate during the night  - Encourage the patient to actively participate in unit groups and or exercise during the day to enhance ability to achieve adequate sleep at night  - Assess the patient, in the morning, encouraging a description of sleep experience  Outcome: Progressing     Problem: Alteration in Thoughts and Perception  Goal: Refrain from acting on delusional thinking/internal stimuli  Description  Interventions:  - Monitor patient closely, per order   - Utilize least restrictive measures   - Set reasonable limits, give positive feedback for acceptable   - Administer medications as ordered and monitor of potential side effects  Outcome: Progressing  Goal: Agree to be compliant with medication regime, as prescribed and report medication side effects  Description  Interventions:  - Offer appropriate PRN medication and supervise ingestion; conduct AIMS, as needed   Outcome: Progressing  Goal: Complete daily ADLs, including personal hygiene independently, as able  Description  Interventions:  - Observe, teach, and assist patient with ADLS  - Monitor and promote a balance of rest/activity, with adequate nutrition and elimination   Outcome: Progressing     Problem: Ineffective Coping  Goal: Demonstrates healthy coping skills  Outcome: Progressing  Goal: Participates in unit activities  Description  Interventions:  - Provide therapeutic environment   - Provide required programming   - Redirect inappropriate behaviors   Outcome: Progressing     Problem: Alteration in Orientation  Goal: Treatment Goal: Demonstrate a reduction of confusion and improved orientation to person, place, time and/or situation upon discharge, according to optimum baseline/ability  Outcome: Progressing  Goal: Interact with staff daily  Description  Interventions:  - Assess and re-assess patient's level of orientation  - Engage patient in 1 on 1 interactions, daily, for a minimum of 15 minutes   - Establish rapport/trust with patient   Outcome: Progressing  Goal: Allow medical examinations, as recommended  Description  Interventions:  - Provide physical/neurological exams and/or referrals, per provider   Outcome: Progressing  Goal: Cooperate with recommended testing/procedures  Description  Interventions:  - Determine need for ancillary testing  - Observe for mental status changes  - Implement falls/precaution protocol   Outcome: Progressing  Goal: Attend and participate in unit activities, including therapeutic, recreational, and educational groups  Description  Interventions:  - Provide therapeutic and educational activities daily, encourage attendance and participation, and document same in the medical record   - Provide appropriate opportunities for reminiscence   - Provide a consistent daily routine   - Encourage family contact/visitation   Outcome: Progressing  Goal: Complete daily ADLs, including personal hygiene independently, as able  Description  Interventions:  - Observe, teach, and assist patient with ADLS  Outcome: Progressing     Problem: DISCHARGE PLANNING - CARE MANAGEMENT  Goal: Discharge to post-acute care or home with appropriate resources  Description  INTERVENTIONS:  - Conduct assessment to determine patient/family and health care team treatment goals, and need for post-acute services based on payer coverage, community resources, and patient preferences, and barriers to discharge  - Address psychosocial, clinical, and financial barriers to discharge as identified in assessment in conjunction with the patient/family and health care team  - Arrange appropriate level of post-acute services according to patients   needs and preference and payer coverage in collaboration with the physician and health care team  - Communicate with and update the patient/family, physician, and health care team regarding progress on the discharge plan  - Arrange appropriate transportation to post-acute venues  Outcome: Progressing     Problem: Nutrition/Hydration-ADULT  Goal: Nutrient/Hydration intake appropriate for improving, restoring or maintaining nutritional needs  Description  Monitor and assess patient's nutrition/hydration status for malnutrition  Collaborate with interdisciplinary team and initiate plan and interventions as ordered  Monitor patient's weight and dietary intake as ordered or per policy  Utilize nutrition screening tool and intervene as necessary  Determine patient's food preferences and provide high-protein, high-caloric foods as appropriate       INTERVENTIONS:  - Monitor oral intake, urinary output, labs, and treatment plans  - Assess nutrition and hydration status and recommend course of action  - Evaluate amount of meals eaten  - Assist patient with eating if necessary   - Allow adequate time for meals  - Recommend/ encourage appropriate diets, oral nutritional supplements, and vitamin/mineral supplements  - Order, calculate, and assess calorie counts as needed  - Recommend, monitor, and adjust tube feedings and TPN/PPN based on assessed needs  - Assess need for intravenous fluids  - Provide specific nutrition/hydration education as appropriate  - Include patient/family/caregiver in decisions related to nutrition  Outcome: Progressing

## 2019-10-25 NOTE — PROGRESS NOTES
The patient maintained on continual close proximity observation for high fall risk, poor insight, poor safety awareness, and unsteady gait  The patient alert to self, disoriented and confused  The patient exhibits poverty of speech and thought  The patient's mood noted to be labile, intermittent periods of crying, but less restless than previous day  The patient's pace of gait noted to decreased from previous day  The patient noted to be standing more upright with ambulation  The patient dependent on clinical staff with ambulation due to unsteady gait, poor safety awareness and poor insight  No verbal or non-verbal complaints of pain noted

## 2019-10-25 NOTE — SOCIAL WORK
Psycho soc completed with information gather from facility psych soc and family meeting  PT is admitted for medication adjustment recently had haldol added and PT regressed significant;  PT resides at [facility] since , prior to this was at home with   PT is supported by  and children   PT has no current SI/HI, but appears to have visual hallucination based on picking at air and ground when no objects are appearing; strength is walking; limitation regression in mental health; coping skills walking and listening to music likes Manual Maykel; HX of mental health Alzheimer 10 plus years; no past hospitalizations for psych; HX of psych medications include Celexa, ativan, haldol, and namenda; compliant with medications; no access to firearms; screamed she wanted to die I the past 12 months; no HX of violence to self or to others; no HX of abuse or trauma; family HX of mental health/suicide/homicide/substance abuse/dementia unknown; no current substance abuse; does not smoke; no other past addiction or past or current legal problems;  to Lucas Presley; 5 children; no pets; parents ; has a sister and a brother; able to return to facility on memory care unit; graduated from [employer] and has 4 years of college; 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

## 2019-10-25 NOTE — SOCIAL WORK
CM attempted to review PT rights with PT and offer for her to attend 303 hearing  PT did not understand her rights  PT looked at [facility] with confused look when requesting if she wanted to attend hearing, put head down and continued to propel self forward in walker with one to one

## 2019-10-25 NOTE — PHYSICAL THERAPY NOTE
10/25/19 1029   Pain Assessment   Pain Assessment 0-10   Pain Score No Pain   Restrictions/Precautions   Weight Bearing Precautions Per Order No   Other Precautions Impulsive;1:1;Cognitive; Fall Risk   General   Chart Reviewed Yes   Family/Caregiver Present No   Cognition   Overall Cognitive Status Impaired   Arousal/Participation Alert; Cooperative   Attention Difficulty attending to directions   Orientation Level Disoriented X4   Memory Unable to assess   Following Commands Unable to follow one step commands   Subjective   Subjective "I want to go home "   Bed Mobility   Additional Comments pt OOB in chair with 1:1 present   Transfers   Sit to Stand   (CGA)   Additional items Assist x 2;Impulsive;Verbal cues   Stand to Sit   (CGA)   Additional items Assist x 2;Impulsive;Verbal cues   Ambulation/Elevation   Gait pattern Improper Weight shift;Decreased foot clearance;Shuffling; Short stride; Ataxia  (lateral lean to R)   Gait Assistance   (CGA)   Additional items Assist x 2;Verbal cues; Tactile cues   Assistive Device   (HHA)   Distance ~1000 feet x 1   Stair Management Assistance Not tested   Balance   Static Sitting Good   Dynamic Sitting Fair   Static Standing Fair -   Dynamic Standing Fair -   Ambulatory Fair -   Endurance Deficit   Endurance Deficit No   Activity Tolerance   Activity Tolerance Patient tolerated treatment well   Nurse Made Aware RN aware   Assessment   Prognosis Guarded   Problem List Impaired balance;Decreased cognition;Decreased safety awareness   Assessment Pt seen for PT treatment session this date with interventions consisting of gait training w/ emphasis on improving pt's ability to ambulate level surfaces x ~1000 feet x 1 with CGA x 2 provided by therapist with no AD  Pt agreeable to PT treatment session upon arrival, pt found seated OOB in recliner, in no apparent distress  In comparison to previous session, pt with improvements in amb distance   Post session: pt returned back to recliner and all needs in reach 1:1 present  Continue to recommend return to SNF at time of d/c in order to maximize pt's functional independence and safety w/ mobility  Pt continues to be functioning below baseline level, and remains limited 2* factors listed above and including decreased strength & safe functional mobility  PT will continue to see pt while here in order to address the deficits listed above and provide interventions consistent w/ POC in effort to achieve STGs  Barriers to Discharge   (resides at [facility])   Goals   Patient Goals "I want to go home "   PT Treatment Day 1   Plan   Treatment/Interventions Functional transfer training;LE strengthening/ROM; Therapeutic exercise; Endurance training;Cognitive reorientation;Patient/family training;Equipment eval/education;Gait training;Spoke to nursing;Spoke to advanced practitioner   Progress Progressing toward goals   PT Frequency Monitor status   Recommendation   Recommendation Long-term skilled nursing home placement  (return to SNF)   Equipment Recommended   (TBD)   PT - OK to Discharge Yes  (when med cleared to SNF)   Chandana Bustos, ROSENDO

## 2019-10-25 NOTE — SOCIAL WORK
Hearing Documentation    303 hearing held today;  in attendance:  Lisa Moraes Review Officer; Bob[address] Carvalho [address]; [address]; staff psych; ;  pt declined attendance;  [phone] recommendation upheld for up to an additional 20 days of inpt treatment at [address] [place] [address];  303 expires: 11/14/19

## 2019-10-25 NOTE — QUICK NOTE
Pt being pushed around the unit in wheeled chair  Declined an opportunity to walk at this time  Able to smile, make eye contact, and state her name  Calm and controlled while the chair is in motion  Unable to respond to any other questions

## 2019-10-26 PROCEDURE — 99231 SBSQ HOSP IP/OBS SF/LOW 25: CPT | Performed by: PSYCHIATRY & NEUROLOGY

## 2019-10-26 RX ORDER — LOSARTAN POTASSIUM 50 MG/1
25 TABLET ORAL 2 TIMES DAILY
Status: DISCONTINUED | OUTPATIENT
Start: 2019-10-26 | End: 2019-11-13 | Stop reason: HOSPADM

## 2019-10-26 RX ADMIN — LEVOTHYROXINE SODIUM 88 MCG: 88 TABLET ORAL at 05:50

## 2019-10-26 RX ADMIN — AMANTADINE HYDROCHLORIDE 50 MG: 50 SOLUTION ORAL at 21:07

## 2019-10-26 RX ADMIN — MEMANTINE 10 MG: 5 TABLET ORAL at 17:35

## 2019-10-26 RX ADMIN — Medication 20 MG: at 09:28

## 2019-10-26 RX ADMIN — LOSARTAN POTASSIUM 25 MG: 50 TABLET, FILM COATED ORAL at 17:35

## 2019-10-26 RX ADMIN — MEMANTINE 10 MG: 5 TABLET ORAL at 09:28

## 2019-10-26 RX ADMIN — HYDROXYZINE HYDROCHLORIDE 25 MG: 25 TABLET ORAL at 15:48

## 2019-10-26 RX ADMIN — AMANTADINE HYDROCHLORIDE 50 MG: 50 SOLUTION ORAL at 09:27

## 2019-10-26 RX ADMIN — LOSARTAN POTASSIUM 25 MG: 50 TABLET, FILM COATED ORAL at 09:29

## 2019-10-26 RX ADMIN — CITALOPRAM HYDROBROMIDE 10 MG: 10 TABLET ORAL at 09:28

## 2019-10-26 NOTE — PROGRESS NOTES
Wilton Amado#  KIR:[id number] F  VOB:[id number]    FRO:[id number]  Adm Date: 10/22/2019 4412  9:26 AM   ATT PHY: Arnold Martell, [facility] Count includes the [facility] [address]         [facility]     The patient was seen after reviewing the chart and discussing the case with caring staff  Today during our encounter, the patient reported no acute concerns  She was largely non-verbal during our encounter  BP's have been high  Objective     Vitals:    10/26/19 0650   BP: 155/77   Pulse: 60   Resp: 20   Temp: 98 °F (36 7 °C)   SpO2: 99%       General Appearance: Awake and Alert  No acute distress  HEENT: Normocephalic, atraumatic  PERRLA, EOMI, MMM  Heart: RRR, no murmurs  Normal S1 and S2   Lungs: CTA bilaterally with fair air entry  Assessment     Fred Amado is a(n) 68y o  year old female with dementia with behavioral disturbance     1  Alzheimer's dementia with behavioral disturbance   Patient's dementia seems to be advanced   Patient's PET CT of the brain on 10/15/2015 showed suspicion for Lewy body disease  Some of patient's symptoms including visual hallucinations and mild cogwheel rigidity a sign of parkinsonism supports that diagnosis  Patient has been started on low dose amantadine  There was also a suspicion for Frontotemporal Dementia with cerebral cortical atrophy   Currently patient is on Namenda 10 mg b i d  Patient is otherwise doing well with PT   2  Hypothyroidism   Patient is on levothyroxine  3  Cardiac with history of hypertension   Will increase losartan to 25mg twice per day  4  GERD   Patient is on Protonix  5  Anorexia/weight loss   Patient has lost 5 lb over the past 3 weeks   I have consulted Nutrition for evaluation and treatment   Prealbumin level within normal limits  I put the patient on Ensure nutritional supplements after the meals  6  Gait abnormality   PT/OT  7  DJD/osteoarthritis   Patient may get Tylenol on as needed basis    8  Vitamin-D deficiency  Vitamin-D bolus doses for 12 weeks followed by vitamin D3 1000 units daily  9  Vitamin B12 deficiency  Monthly B12 injections  The patient was discussed with Dr Eun Adame and he is in agreement with the above note  The patient was discussed with Dr Eun Adame and he is in agreement with the above note

## 2019-10-26 NOTE — PROGRESS NOTES
C/O" I am fine am fine'    Report from staff regarding this patient received and record reviewed  prior to seeing this patient   Behavior over the last 24 hours:  Patient seen on the unit who is here for and NCD, this patient is one-to-one while awake  Sleep:  OK  Appetite:  Fair  Medication side effects:  None  ROS:  No change since yesterday  Mental Status Evaluation:  Appearance:  Dressed appropraitely   Behavior:  cooperative   Speech:  normal   Mood:  Anxious   Affect:   blunted   Thought Process:  Disorganized   Thought Content:  Paranoid   Perceptual Disturbances: Denied AV hallucination   Risk Potential: NO KIARA    Sensorium:  normal   Cognition:  intact   Consciousness:  Alert, OX2   Attention: Fair   Insight:  Limited   Judgment: Limited   Gait/Station: With in normal range   Motor Activity: With in normal range     Progress Toward Goals: working on current treatment goals, no changes  Made in treatment plan   Recommended Treatment: Continue with group therapy, milieu therapy and occupational therapy  Risks, benefits and possible side effects of Medications:   Risks, benefits, and possible side effects of medications explained to patient and patient verbalizes understanding        Medications:   current meds:   Current Facility-Administered Medications   Medication Dose Route Frequency    acetaminophen (TYLENOL) tablet 975 mg  975 mg Oral Q6H PRN    aluminum-magnesium hydroxide-simethicone (MYLANTA) 200-200-20 mg/5 mL oral suspension 30 mL  30 mL Oral Q4H PRN    amantadine (SYMMETREL) oral syrup 50 mg  50 mg Oral BID    [START ON 12/25/2019] cholecalciferol (VITAMIN D3) tablet 1,000 Units  1,000 Units Oral Daily    citalopram (CeleXA) tablet 10 mg  10 mg Oral Daily    cyanocobalamin injection 1,000 mcg  1,000 mcg Intramuscular Q30 Days    ergocalciferol (VITAMIN D2) capsule 50,000 Units  50,000 Units Oral Weekly    hydrOXYzine HCL (ATARAX) tablet 25 mg  25 mg Oral Q4H PRN    ibuprofen (MOTRIN) tablet 200 mg  200 mg Oral Q8H PRN    ibuprofen (MOTRIN) tablet 400 mg  400 mg Oral Q8H PRN    levothyroxine tablet 88 mcg  88 mcg Oral Early Morning    LORazepam (ATIVAN) 2 mg/mL injection 1 mg  1 mg Intramuscular Q4H PRN    LORazepam (ATIVAN) tablet 0 5 mg  0 5 mg Oral Q6H PRN    losartan (COZAAR) tablet 25 mg  25 mg Oral BID    magnesium hydroxide (MILK OF MAGNESIA) 400 mg/5 mL oral suspension 30 mL  30 mL Oral Daily PRN    memantine (NAMENDA) tablet 10 mg  10 mg Oral BID    OLANZapine (ZyPREXA) IM injection 5 mg  5 mg Intramuscular Q8H PRN    OLANZapine (ZyPREXA) tablet 5 mg  5 mg Oral Q8H PRN    omeprazole (PRILOSEC) suspension 2 mg/mL  20 mg Oral Daily    traZODone (DESYREL) tablet 25 mg  25 mg Oral BID PRN    traZODone (DESYREL) tablet 50 mg  50 mg Oral HS PRN     Labs: NA    Assessment, Diagnosis  and Plan: continue with current meds and goals,, patient does need monitoring on one-to-one basis F/U tomorrow    Counseling / Coordination of Care  Total floor / unit time spent today20 minutes  minutes  Greater than 50% of total time was spent with the patient and / or family counseling and / or coordination of care   A description of the counseling / coordination of care: CT 1;1    Valeri King MD

## 2019-10-26 NOTE — NURSING NOTE
Pt awoke at 0400, to use BR assisted to BR,  difficulty returning to sleep; provided refreshments; remains awake ongoing monitoring will continue 1 to 1 ongoing

## 2019-10-26 NOTE — PROGRESS NOTES
Patient compliant with morning medications crush in apple sauce  Patient disoriented X 4  Patient drowsy and refused to walk this shift  Denies pain  Minimally verbal  Calm and controlled this shift  Continues to be on a 1:1 observation for behaviors and safety  No other concerns noted this shift  Will continue to monitor for behaviors and changes

## 2019-10-26 NOTE — NURSING NOTE
Pt presents as calm  Alert and Oriented to self  Affect bland  Poverty of thought  Minimally verbal; responds well to cues  No acute behavioral issues  Found to be sleeping at by 2245  Ongoing monitoring will continue  Fall precautions in place

## 2019-10-26 NOTE — PLAN OF CARE
Problem: Prexisting or High Potential for Compromised Skin Integrity  Goal: Skin integrity is maintained or improved  Description  INTERVENTIONS:  - Identify patients at risk for skin breakdown  - Assess and monitor skin integrity  - Assess and monitor nutrition and hydration status  - Monitor labs   - Assess for incontinence   - Turn and reposition patient  - Assist with mobility/ambulation  - Relieve pressure over bony prominences  - Avoid friction and shearing  - Provide appropriate hygiene as needed including keeping skin clean and dry  - Evaluate need for skin moisturizer/barrier cream  - Collaborate with interdisciplinary team   - Patient/family teaching  - Consider wound care consult   Outcome: Progressing     Problem: DEPRESSION  Goal: Will be euthymic at discharge  Description  INTERVENTIONS:  - Administer medication as ordered  - Provide emotional support via 1:1 interaction with staff  - Encourage involvement in milieu/groups/activities  - Monitor for social isolation  Outcome: Progressing     Problem: BEHAVIOR  Goal: Pt/Family maintain appropriate behavior and adhere to behavioral management agreement, if implemented  Description  INTERVENTIONS:  - Assess the family dynamic   - Encourage verbalization of thoughts and concerns in a socially appropriate manner  - Assess patient/family's coping skills and non-compliant behavior (including use of illegal substances)  - Utilize positive, consistent limit setting strategies supporting safety of patient, staff and others  - Initiate consult with Case Management, Spiritual Care or other ancillary services as appropriate  - If a patient's/visitor's behavior jeopardizes the safety of the patient, staff, or others, refer to organization procedure     - Notify Security of behavior or suspected illegal substances which indicate the need for search of the patient and/or belongings  - Encourage participation in the decision making process about a behavioral management agreement; implement if patient meets criteria  Outcome: Progressing     Problem: ANXIETY  Goal: Will report anxiety at manageable levels  Description  INTERVENTIONS:  - Administer medication as ordered  - Teach and encourage coping skills  - Provide emotional support  - Assess patient/family for anxiety and ability to cope  Outcome: Progressing  Goal: By discharge: Patient will verbalize 2 strategies to deal with anxiety  Description  Interventions:  - Identify any obvious source/trigger to anxiety  - Staff will assist patient in applying identified coping technique/skills  - Encourage attendance of scheduled groups and activities  Outcome: Progressing     Problem: SLEEP DISTURBANCE  Goal: Will exhibit normal sleeping pattern  Description  Interventions:  -  Assess the patients sleep pattern, noting recent changes  - Administer medication as ordered  - Decrease environmental stimuli, including noise, as appropriate during the night  - Encourage the patient to actively participate in unit groups and or exercise during the day to enhance ability to achieve adequate sleep at night  - Assess the patient, in the morning, encouraging a description of sleep experience  Outcome: Progressing     Problem: INVOLUNTARY ADMIT  Goal: Will cooperate with staff recommendations and doctor's orders and will demonstrate appropriate behavior  Description  INTERVENTIONS:  - Treat underlying conditions and offer medication as ordered  - Educate regarding involuntary admission procedures and rules  - Utilize positive consistent limit setting strategies to support patient and staff safety  Outcome: Progressing     Problem: Alteration in Thoughts and Perception  Goal: Verbalize thoughts and feelings  Description  Interventions:  - Promote a nonjudgmental and trusting relationship with the patient through active listening and therapeutic communication  - Assess patient's level of functioning, behavior and potential for risk  - Engage patient in 1 on 1 interactions  - Encourage patient to express fears, feelings, frustrations, and discuss symptoms    - [place] patient to reality, help patient recognize reality-based thinking   - Administer medications as ordered and assess for potential side effects  - Provide the patient education related to the signs and symptoms of the illness and desired effects of prescribed medications  Outcome: Progressing  Goal: Refrain from acting on delusional thinking/internal stimuli  Description  Interventions:  - Monitor patient closely, per order   - Utilize least restrictive measures   - Set reasonable limits, give positive feedback for acceptable   - Administer medications as ordered and monitor of potential side effects  Outcome: Progressing  Goal: Agree to be compliant with medication regime, as prescribed and report medication side effects  Description  Interventions:  - Offer appropriate PRN medication and supervise ingestion; conduct AIMS, as needed   Outcome: Progressing  Goal: Complete daily ADLs, including personal hygiene independently, as able  Description  Interventions:  - Observe, teach, and assist patient with ADLS  - Monitor and promote a balance of rest/activity, with adequate nutrition and elimination   Outcome: Progressing     Problem: Ineffective Coping  Goal: Demonstrates healthy coping skills  Outcome: Progressing  Goal: Participates in unit activities  Description  Interventions:  - Provide therapeutic environment   - Provide required programming   - Redirect inappropriate behaviors   Outcome: Progressing     Problem: Alteration in Orientation  Goal: Treatment Goal: Demonstrate a reduction of confusion and improved orientation to person, place, time and/or situation upon discharge, according to optimum baseline/ability  Outcome: Progressing  Goal: Interact with staff daily  Description  Interventions:  - Assess and re-assess patient's level of orientation  - Engage patient in 1 on 1 interactions, daily, for a minimum of 15 minutes   - Establish rapport/trust with patient   Outcome: Progressing  Goal: Allow medical examinations, as recommended  Description  Interventions:  - Provide physical/neurological exams and/or referrals, per provider   Outcome: Progressing  Goal: Cooperate with recommended testing/procedures  Description  Interventions:  - Determine need for ancillary testing  - Observe for mental status changes  - Implement falls/precaution protocol   Outcome: Progressing  Goal: Attend and participate in unit activities, including therapeutic, recreational, and educational groups  Description  Interventions:  - Provide therapeutic and educational activities daily, encourage attendance and participation, and document same in the medical record   - Provide appropriate opportunities for reminiscence   - Provide a consistent daily routine   - Encourage family contact/visitation   Outcome: Progressing  Goal: Complete daily ADLs, including personal hygiene independently, as able  Description  Interventions:  - Observe, teach, and assist patient with ADLS  Outcome: Progressing     Problem: Nutrition/Hydration-ADULT  Goal: Nutrient/Hydration intake appropriate for improving, restoring or maintaining nutritional needs  Description  Monitor and assess patient's nutrition/hydration status for malnutrition  Collaborate with interdisciplinary team and initiate plan and interventions as ordered  Monitor patient's weight and dietary intake as ordered or per policy  Utilize nutrition screening tool and intervene as necessary  Determine patient's food preferences and provide high-protein, high-caloric foods as appropriate       INTERVENTIONS:  - Monitor oral intake, urinary output, labs, and treatment plans  - Assess nutrition and hydration status and recommend course of action  - Evaluate amount of meals eaten  - Assist patient with eating if necessary   - Allow adequate time for meals  - Recommend/ encourage appropriate diets, oral nutritional supplements, and vitamin/mineral supplements  - Order, calculate, and assess calorie counts as needed  - Recommend, monitor, and adjust tube feedings and TPN/PPN based on assessed needs  - Assess need for intravenous fluids  - Provide specific nutrition/hydration education as appropriate  - Include patient/family/caregiver in decisions related to nutrition  Outcome: Progressing

## 2019-10-26 NOTE — PROGRESS NOTES
Patient restless, anxious and tearful  Patient disorganized and not making sense  Patient pacing around the unit with 1:1 staff  Atarax given for increased in anxiety  Will continue to monitor for effectiveness

## 2019-10-26 NOTE — PROGRESS NOTES
PRN was effective, patient compliant with 1700 medications and meals  Patient continues to be restless but control  1:1 in place  Will continue to monitor for behaviors and changes

## 2019-10-27 PROCEDURE — 99232 SBSQ HOSP IP/OBS MODERATE 35: CPT | Performed by: NURSE PRACTITIONER

## 2019-10-27 RX ADMIN — AMANTADINE HYDROCHLORIDE 50 MG: 50 SOLUTION ORAL at 21:02

## 2019-10-27 RX ADMIN — AMANTADINE HYDROCHLORIDE 50 MG: 50 SOLUTION ORAL at 08:57

## 2019-10-27 RX ADMIN — LOSARTAN POTASSIUM 25 MG: 50 TABLET, FILM COATED ORAL at 17:28

## 2019-10-27 RX ADMIN — CITALOPRAM HYDROBROMIDE 10 MG: 10 TABLET ORAL at 08:58

## 2019-10-27 RX ADMIN — MEMANTINE 10 MG: 5 TABLET ORAL at 08:58

## 2019-10-27 RX ADMIN — LOSARTAN POTASSIUM 25 MG: 50 TABLET, FILM COATED ORAL at 08:58

## 2019-10-27 RX ADMIN — LEVOTHYROXINE SODIUM 88 MCG: 88 TABLET ORAL at 06:30

## 2019-10-27 RX ADMIN — Medication 20 MG: at 08:57

## 2019-10-27 RX ADMIN — MEMANTINE 10 MG: 5 TABLET ORAL at 17:29

## 2019-10-27 NOTE — PROGRESS NOTES
Patient compliant with 1700 medications and meals  Patient continues to be on a 1:1 observation for safety and behaviors  Patient alert to self at times  No behaviors noted this shift  Will continue to monitor for behaviors and changes

## 2019-10-27 NOTE — PROGRESS NOTES
Progress Note - Jose Juanelena Amado 68 y o  female MRN: [id number]  Unit/Bed#: [facility] 203-02 Encounter: [id number]    The patient was seen for continuing care and reviewed with treatment team   Patient alert to self  She has been walking much of the morning though is currently sitting and appears to be settled for the moment  She has not been aggressive towards staff though is very unaware of her surroundings and does not utilize safety measures appropriately; 1:1 observation while awake is necessary at this time  She is sleeping through the night  Patient does not appear to be experiencing side effects of medications  Mental Status Evaluation:  Appearance:  Adequate hygiene and grooming and Poor eye contact   Behavior:  calm   Fund of knowledge  Poor   Speech:   Language: Normal rate and Normal volume  anomia Yes and aphasia Yes   Mood:  Uncertain   Affect:   Associations: constricted  Loosely connected   Thought Process:  disorganized   Thought Content:  Cannot be assessed due to patient factors   Perceptual Disturbances: Cannot be assessed due to patient factors though does not appear to experiencing hallucinations at this time   Risk Potential: Unable to assess due to patient factors   Orientation  Oriented to self only   Memory Short term impaired and Long term impaired   Attention/Concentration attention span appeared shorter than expected for age   Insight:  No insight   Judgment: Poor judgment   Gait/Station: Unsteady   Motor Activity: cogwheeling     Vitals:    10/27/19 0709   BP: 138/77   Pulse: 66   Resp: 18   Temp: 98 4 °F (36 9 °C)   SpO2: 97%     Progress Toward Goals: medication adherent   1:1 visual observation for safety of self and others    Assessment/Plan    Principal Problem:    Major neurocognitive disorder, due to multiple etiologies, with behavioral disturbance, severe (HCC)  Active Problems:    Major depressive disorder    Plan:   Continue 1:1 visual observation while awake  Continue group and milieu therapy with access to areas of decreased stimulation when necessary  Continue current medications  Discharge planning ongoing    Recommended Treatment: Continue with pharmacotherapy, group therapy, milieu therapy and occupational therapy  The patient will be maintained on the following medications:    Current Facility-Administered Medications:  acetaminophen 975 mg Oral Q6H PRN Thaonet Ng, CRNP   aluminum-magnesium hydroxide-simethicone 30 mL Oral Q4H PRN Jennet Ng, CRNP   amantadine 50 mg Oral BID Dick Dawkins MD   [START ON 12/25/2019] cholecalciferol 1,000 Units Oral Daily Prema Houser MD   citalopram 10 mg Oral Daily Jennet Ng, CRNP   cyanocobalamin 1,000 mcg Intramuscular Q30 Days Prema Houser MD   ergocalciferol 50,000 Units Oral Weekly Prema Houser MD   hydrOXYzine HCL 25 mg Oral Q4H PRN Thaonet Ng, CRNP   ibuprofen 200 mg Oral Q8H PRN Thaonet Ng, CRNP   ibuprofen 400 mg Oral Q8H PRN Jennet Ng, CRNP   levothyroxine 88 mcg Oral Early Morning Jennet Ng, CRNP   LORazepam 1 mg Intramuscular Q4H PRN Jennet Ng, CRNP   LORazepam 0 5 mg Oral Q6H PRN Jennet Ng, CRNP   losartan 25 mg Oral BID Sosa Huber PA-C   magnesium hydroxide 30 mL Oral Daily PRN Jennet Ng, CRNP   memantine 10 mg Oral BID Prema Houser MD   OLANZapine 5 mg Intramuscular Q8H PRN Jennet Ng, CRNP   OLANZapine 5 mg Oral Q8H PRN Thaonet Ng, CRNP   omeprazole (PRILOSEC) suspension 2 mg/mL 20 mg Oral Daily Prema Houser MD   traZODone 25 mg Oral BID PRN Dick Dawkins MD   traZODone 50 mg Oral HS PRN Anna Ng, CRNP       Risks, benefits and possible side effects of Medications:   Patient does not verbalize understanding at this time and will require further explanation        SINA Patel  10/27/2019

## 2019-10-27 NOTE — PLAN OF CARE
Problem: Prexisting or High Potential for Compromised Skin Integrity  Goal: Skin integrity is maintained or improved  Description  INTERVENTIONS:  - Identify patients at risk for skin breakdown  - Assess and monitor skin integrity  - Assess and monitor nutrition and hydration status  - Monitor labs   - Assess for incontinence   - Turn and reposition patient  - Assist with mobility/ambulation  - Relieve pressure over bony prominences  - Avoid friction and shearing  - Provide appropriate hygiene as needed including keeping skin clean and dry  - Evaluate need for skin moisturizer/barrier cream  - Collaborate with interdisciplinary team   - Patient/family teaching  - Consider wound care consult   Outcome: Progressing     Problem: DEPRESSION  Goal: Will be euthymic at discharge  Description  INTERVENTIONS:  - Administer medication as ordered  - Provide emotional support via 1:1 interaction with staff  - Encourage involvement in milieu/groups/activities  - Monitor for social isolation  Outcome: Progressing     Problem: BEHAVIOR  Goal: Pt/Family maintain appropriate behavior and adhere to behavioral management agreement, if implemented  Description  INTERVENTIONS:  - Assess the family dynamic   - Encourage verbalization of thoughts and concerns in a socially appropriate manner  - Assess patient/family's coping skills and non-compliant behavior (including use of illegal substances)  - Utilize positive, consistent limit setting strategies supporting safety of patient, staff and others  - Initiate consult with Case Management, Spiritual Care or other ancillary services as appropriate  - If a patient's/visitor's behavior jeopardizes the safety of the patient, staff, or others, refer to organization procedure     - Notify Security of behavior or suspected illegal substances which indicate the need for search of the patient and/or belongings  - Encourage participation in the decision making process about a behavioral management agreement; implement if patient meets criteria  Outcome: Progressing     Problem: ANXIETY  Goal: Will report anxiety at manageable levels  Description  INTERVENTIONS:  - Administer medication as ordered  - Teach and encourage coping skills  - Provide emotional support  - Assess patient/family for anxiety and ability to cope  Outcome: Progressing  Goal: By discharge: Patient will verbalize 2 strategies to deal with anxiety  Description  Interventions:  - Identify any obvious source/trigger to anxiety  - Staff will assist patient in applying identified coping technique/skills  - Encourage attendance of scheduled groups and activities  Outcome: Progressing     Problem: SLEEP DISTURBANCE  Goal: Will exhibit normal sleeping pattern  Description  Interventions:  -  Assess the patients sleep pattern, noting recent changes  - Administer medication as ordered  - Decrease environmental stimuli, including noise, as appropriate during the night  - Encourage the patient to actively participate in unit groups and or exercise during the day to enhance ability to achieve adequate sleep at night  - Assess the patient, in the morning, encouraging a description of sleep experience  Outcome: Progressing     Problem: INVOLUNTARY ADMIT  Goal: Will cooperate with staff recommendations and doctor's orders and will demonstrate appropriate behavior  Description  INTERVENTIONS:  - Treat underlying conditions and offer medication as ordered  - Educate regarding involuntary admission procedures and rules  - Utilize positive consistent limit setting strategies to support patient and staff safety  Outcome: Progressing     Problem: Alteration in Thoughts and Perception  Goal: Verbalize thoughts and feelings  Description  Interventions:  - Promote a nonjudgmental and trusting relationship with the patient through active listening and therapeutic communication  - Assess patient's level of functioning, behavior and potential for risk  - Engage patient in 1 on 1 interactions  - Encourage patient to express fears, feelings, frustrations, and discuss symptoms    - [place] patient to reality, help patient recognize reality-based thinking   - Administer medications as ordered and assess for potential side effects  - Provide the patient education related to the signs and symptoms of the illness and desired effects of prescribed medications  Outcome: Progressing  Goal: Refrain from acting on delusional thinking/internal stimuli  Description  Interventions:  - Monitor patient closely, per order   - Utilize least restrictive measures   - Set reasonable limits, give positive feedback for acceptable   - Administer medications as ordered and monitor of potential side effects  Outcome: Progressing  Goal: Agree to be compliant with medication regime, as prescribed and report medication side effects  Description  Interventions:  - Offer appropriate PRN medication and supervise ingestion; conduct AIMS, as needed   Outcome: Progressing  Goal: Complete daily ADLs, including personal hygiene independently, as able  Description  Interventions:  - Observe, teach, and assist patient with ADLS  - Monitor and promote a balance of rest/activity, with adequate nutrition and elimination   Outcome: Progressing     Problem: Ineffective Coping  Goal: Demonstrates healthy coping skills  Outcome: Progressing  Goal: Participates in unit activities  Description  Interventions:  - Provide therapeutic environment   - Provide required programming   - Redirect inappropriate behaviors   Outcome: Progressing     Problem: Alteration in Orientation  Goal: Treatment Goal: Demonstrate a reduction of confusion and improved orientation to person, place, time and/or situation upon discharge, according to optimum baseline/ability  Outcome: Progressing  Goal: Interact with staff daily  Description  Interventions:  - Assess and re-assess patient's level of orientation  - Engage patient in 1 on 1 interactions, daily, for a minimum of 15 minutes   - Establish rapport/trust with patient   Outcome: Progressing  Goal: Allow medical examinations, as recommended  Description  Interventions:  - Provide physical/neurological exams and/or referrals, per provider   Outcome: Progressing  Goal: Cooperate with recommended testing/procedures  Description  Interventions:  - Determine need for ancillary testing  - Observe for mental status changes  - Implement falls/precaution protocol   Outcome: Progressing  Goal: Attend and participate in unit activities, including therapeutic, recreational, and educational groups  Description  Interventions:  - Provide therapeutic and educational activities daily, encourage attendance and participation, and document same in the medical record   - Provide appropriate opportunities for reminiscence   - Provide a consistent daily routine   - Encourage family contact/visitation   Outcome: Progressing  Goal: Complete daily ADLs, including personal hygiene independently, as able  Description  Interventions:  - Observe, teach, and assist patient with ADLS  Outcome: Progressing     Problem: Nutrition/Hydration-ADULT  Goal: Nutrient/Hydration intake appropriate for improving, restoring or maintaining nutritional needs  Description  Monitor and assess patient's nutrition/hydration status for malnutrition  Collaborate with interdisciplinary team and initiate plan and interventions as ordered  Monitor patient's weight and dietary intake as ordered or per policy  Utilize nutrition screening tool and intervene as necessary  Determine patient's food preferences and provide high-protein, high-caloric foods as appropriate       INTERVENTIONS:  - Monitor oral intake, urinary output, labs, and treatment plans  - Assess nutrition and hydration status and recommend course of action  - Evaluate amount of meals eaten  - Assist patient with eating if necessary   - Allow adequate time for meals  - Recommend/ encourage appropriate diets, oral nutritional supplements, and vitamin/mineral supplements  - Order, calculate, and assess calorie counts as needed  - Recommend, monitor, and adjust tube feedings and TPN/PPN based on assessed needs  - Assess need for intravenous fluids  - Provide specific nutrition/hydration education as appropriate  - Include patient/family/caregiver in decisions related to nutrition  Outcome: Progressing

## 2019-10-27 NOTE — NURSING NOTE
[id number]     Pt present in the milieu; Affect bland; mood placid  No acute behavioral issues noted  Complaint with HS medications  Ongoing monitoring will continue      [phone]  Pt found to be sleeping on most of authors rounds  No acute behavioral issues noted  Q 7 min checks maintained  Fall protocol in place  Bed alarm on  Ongoing monitoring

## 2019-10-27 NOTE — PROGRESS NOTES
Wilton Amado#  RFR:3/8/2069 F  EMB:[id number]    EAE:[id number]  Adm Date: 10/22/2019 2202  9:26 AM   ATT PHY: Riana Cody, [facility]         Subjective     The patient was seen after reviewing the chart and discussing the case with caring staff  Today during our encounter, the patient reported no acute concerns  She was largely non-verbal during our encounter  Objective     Vitals:    10/27/19 0709   BP: 138/77   Pulse: 66   Resp: 18   Temp: 98 4 °F (36 9 °C)   SpO2: 97%       General Appearance: Awake and Alert  No acute distress  HEENT: Normocephalic, atraumatic  PERRLA, EOMI, MMM  Heart: RRR, no murmurs  Normal S1 and S2   Lungs: CTA bilaterally with fair air entry  Assessment     Padilla Amado is a(n) 68y o  year old female with dementia with behavioral disturbance     1  Alzheimer's dementia with behavioral disturbance   Patient's dementia seems to be advanced   Patient's PET CT of the brain on 10/15/2015 showed suspicion for Lewy body disease  Some of patient's symptoms including visual hallucinations and mild cogwheel rigidity a sign of parkinsonism supports that diagnosis  Patient has been started on low dose amantadine  There was also a suspicion for Frontotemporal Dementia with cerebral cortical atrophy   Currently patient is on Namenda 10 mg b i d  Patient is otherwise doing well with PT   2  Hypothyroidism   Patient is on levothyroxine  3  Cardiac with history of hypertension   Losartan 25mg BID  4  GERD   Patient is on Protonix  5  Anorexia/weight loss   Patient has lost 5 lb over the past 3 weeks   I have consulted Nutrition for evaluation and treatment   Prealbumin level within normal limits  I put the patient on Ensure nutritional supplements after the meals  6  Gait abnormality   PT/OT  7  DJD/osteoarthritis   Patient may get Tylenol on as needed basis  8  Vitamin-D deficiency   Vitamin-D bolus doses for 12 weeks followed by vitamin D3 1000 units daily  9  Vitamin B12 deficiency  Monthly B12 injections  The patient was discussed with Dr Silviano Araujo and he is in agreement with the above note

## 2019-10-28 PROCEDURE — 99232 SBSQ HOSP IP/OBS MODERATE 35: CPT | Performed by: PSYCHIATRY & NEUROLOGY

## 2019-10-28 RX ORDER — AMANTADINE HYDROCHLORIDE 50 MG/5ML
50 SOLUTION ORAL DAILY
Status: DISCONTINUED | OUTPATIENT
Start: 2019-10-29 | End: 2019-10-31

## 2019-10-28 RX ORDER — AMANTADINE HYDROCHLORIDE 100 MG/1
100 CAPSULE, GELATIN COATED ORAL
Status: DISCONTINUED | OUTPATIENT
Start: 2019-10-28 | End: 2019-11-13 | Stop reason: HOSPADM

## 2019-10-28 RX ADMIN — AMANTADINE HYDROCHLORIDE 50 MG: 50 SOLUTION ORAL at 08:39

## 2019-10-28 RX ADMIN — AMANTADINE 100 MG: 100 CAPSULE ORAL at 21:45

## 2019-10-28 RX ADMIN — LOSARTAN POTASSIUM 25 MG: 50 TABLET, FILM COATED ORAL at 08:39

## 2019-10-28 RX ADMIN — MEMANTINE 10 MG: 5 TABLET ORAL at 08:39

## 2019-10-28 RX ADMIN — LEVOTHYROXINE SODIUM 88 MCG: 88 TABLET ORAL at 06:23

## 2019-10-28 RX ADMIN — LOSARTAN POTASSIUM 25 MG: 50 TABLET, FILM COATED ORAL at 17:23

## 2019-10-28 RX ADMIN — MEMANTINE 10 MG: 5 TABLET ORAL at 17:23

## 2019-10-28 RX ADMIN — CITALOPRAM HYDROBROMIDE 10 MG: 10 TABLET ORAL at 08:39

## 2019-10-28 RX ADMIN — Medication 20 MG: at 08:39

## 2019-10-28 NOTE — PLAN OF CARE
PT is progressing, will return to [facility],[address], no D/C date at this time

## 2019-10-28 NOTE — PLAN OF CARE
Problem: Ineffective Coping  Goal: Participates in unit activities  Description  Interventions:  - Provide therapeutic environment   - Provide required programming   - Redirect inappropriate behaviors   Outcome: Not Progressing   Patient is able to tolerate groups for moments; she prefers to ambulate  When she is in conversation thoughts and responses are disorganized due to level of dementia

## 2019-10-28 NOTE — PROGRESS NOTES
Progress Note - Terrell Jane Amado 68 y o  female MRN: [id number]  Unit/Bed#: Garrett Roberto 203-02 Encounter: [id number]    Assessment/Plan   Principal Problem:    Major neurocognitive disorder, due to multiple etiologies, with behavioral disturbance, severe (HCC)  Active Problems:    Major depressive disorder      Behavior over the last 24 hours:  improved  Sleep: improved  Appetite: increased  Medication side effects: No  ROS: no complaints    Mental Status Evaluation:  Appearance:  age appropriate   Behavior:  less guarded   Speech:  impoverished   Mood:  anxious   Affect:  constricted   Thought Process:  disorganized   Thought Content:  poverty of thoughts   Perceptual Disturbances: Visual hallucinations reduced   Risk Potential: for mild agitation   Sensorium:  person   Cognition:  impaired due to NCD   Consciousness:  alert and awake    Attention: poor   Insight:  impaired due to NCD   Judgment: limited   Gait/Station: unstable   Motor Activity: mild EPS, improving      Progress Toward Goals: Pt has been showing increase reactive affect, walks with reduction of leaning to  right side  She has been compliant with meds  Recommended Treatment: Continue with group therapy, milieu therapy and occupational therapy  Risks, benefits and possible side effects of Medications:   Pt is unable to understand due to her NCD    Medications: all current active meds have been reviewed  Labs: were reviewed    Counseling / Coordination of Care  Total floor / unit time spent today 20 minutes  Greater than 50% of total time was spent with the patient and / or family counseling and / or coordination of care

## 2019-10-28 NOTE — PROGRESS NOTES
10/28/19 1000   Team Members Present   Team Members Present Physician;Nurse;;; Occupational Therapist   Physician Team Member Dr Noe Da Silva MD; Supa Lynne [place]    Nursing Team Member Pako Salcedo, MARIOLA    Care Management Team Member Curt Wood MS, [facility]    Social Work Team Member Kalie Olsen [facility]    OT Team Member Siddharth Vázquez [place]    Patient/Family Present   Patient Present No   Patient's Family Present No     1:1 - close proximity - safety; slept; more calm; more tolerant of sitting for periods of time

## 2019-10-28 NOTE — PROGRESS NOTES
Wilton Amado#  VQS:[birth year] F  QNE:[id number]    ROSALINA:[id number]  Adm Date: 10/22/2019 1235  9:26 AM   ATT PHY: Amaury Lawrence, [address] [facility] [address]         Subjective     The patient was seen after reviewing the chart and discussing the case with caring staff  Patient examined at bedside  Today patient appears pleasant she gave me a smile when I ask for it  Objective     Vitals:    10/28/19 0748   BP: (!) 173/74   Pulse: 61   Resp:    Temp:    SpO2:        General Appearance: Awake and Alert  No acute distress  HEENT: Normocephalic, atraumatic  PERRLA, EOMI, MMM  Heart: RRR, no murmurs  Normal S1 and S2   Lungs: CTA bilaterally with fair air entry  Assessment     Nelson Amado is a(n) 68y o  year old female with dementia with behavioral disturbance     1  Alzheimer's dementia with behavioral disturbance   Patient's dementia seems to be advanced   Patient's PET CT of the brain on 10/15/2015 showed suspicion for Lewy body disease  Some of patient's symptoms including visual hallucinations and mild cogwheel rigidity a sign of parkinsonism supports that diagnosis  Patient has been started on low dose amantadine  There was also a suspicion for Frontotemporal Dementia with cerebral cortical atrophy   Currently patient is on Namenda 10 mg b i d  Patient is otherwise doing well with PT   2  Hypothyroidism   Patient is on levothyroxine  3  Cardiac with history of hypertension   Losartan 25mg BID  4  GERD   Patient is on Protonix  5  Anorexia/weight loss   Patient has lost 5 lb over the past 3 weeks   I have consulted Nutrition for evaluation and treatment   Prealbumin level within normal limits  I put the patient on Ensure nutritional supplements after the meals  6  Gait abnormality   PT/OT  7  DJD/osteoarthritis   Patient may get Tylenol on as needed basis  8  Vitamin-D deficiency   Vitamin-D bolus doses for 12 weeks followed by vitamin D3 1000 units daily   9  Vitamin B12 deficiency  Monthly B12 injections

## 2019-10-28 NOTE — PROGRESS NOTES
Pt remains 1:1 continuous observation due to safety & behavior issues  Pt is oriented to self  Pt is up & ambulating with assist of 1 person  Pt keeps to herself & does not socialize with others  Q 7 min checks maintained to monitor pt's behavior & safety  Pt is cooperative with care & compliant with medications

## 2019-10-28 NOTE — NURSING NOTE
[phone]  Pt found to be sleeping on most of authors rounds  No acute behavioral issues noted  Q 7 min checks maintained  Fall protocol in place

## 2019-10-28 NOTE — SOCIAL WORK
CM spoke with PT  Israel Gonazlez and updated him on PT status and TX plan  Israel Gonzalez indicated he will be coming down to visit with PT this evening and looking forward to as it has been difficult not seeing her  Willadean Res in inquired about PT medications, CM updated Willadean Res on status  CM encouraged Austin to reach out to CM with any needs or concerns, PT  agreed, Call need mutually  [phone]

## 2019-10-28 NOTE — NURSING NOTE
Low level of energy noted this shift  Sitting calmly in dinning zhou; continues to minimally verbal; unable to make needs known  Needs anticipated and acessessed frequently  No agitation or anxiety noted this shift  1 to 1 ongoing for poor safety awareness

## 2019-10-28 NOTE — PROGRESS NOTES
Pt remains 1:1 continuous observation due to safety & behavior issues  Pt is quiet & ambulating with supervision of 1 person  Q 7 min checks maintained to monitor pt's behavior & safety

## 2019-10-29 PROCEDURE — 99231 SBSQ HOSP IP/OBS SF/LOW 25: CPT | Performed by: PSYCHIATRY & NEUROLOGY

## 2019-10-29 PROCEDURE — 97116 GAIT TRAINING THERAPY: CPT

## 2019-10-29 RX ADMIN — MEMANTINE 10 MG: 5 TABLET ORAL at 08:24

## 2019-10-29 RX ADMIN — MEMANTINE 10 MG: 5 TABLET ORAL at 17:33

## 2019-10-29 RX ADMIN — LEVOTHYROXINE SODIUM 88 MCG: 88 TABLET ORAL at 06:28

## 2019-10-29 RX ADMIN — LOSARTAN POTASSIUM 25 MG: 50 TABLET, FILM COATED ORAL at 17:33

## 2019-10-29 RX ADMIN — LOSARTAN POTASSIUM 25 MG: 50 TABLET, FILM COATED ORAL at 08:24

## 2019-10-29 RX ADMIN — Medication 20 MG: at 08:24

## 2019-10-29 RX ADMIN — AMANTADINE HYDROCHLORIDE 50 MG: 50 SOLUTION ORAL at 08:24

## 2019-10-29 RX ADMIN — AMANTADINE 100 MG: 100 CAPSULE ORAL at 21:11

## 2019-10-29 RX ADMIN — CITALOPRAM HYDROBROMIDE 10 MG: 10 TABLET ORAL at 08:24

## 2019-10-29 NOTE — PHYSICAL THERAPY NOTE
Physical Therapy Treatment Note       10/29/19 1043   Pain Assessment   Pain Assessment 0-10   Pain Score No Pain   Pain Rating: FLACC (Rest) - Face 0   Pain Rating: FLACC (Rest) - Legs 0   Pain Rating: FLACC (Rest) - Activity 0   Pain Rating: FLACC (Rest) - Cry 0   Pain Rating: FLACC (Rest) - Consolability 0   Score: FLACC (Rest) 0   Pain Rating: FLACC (Activity) - Face 0   Pain Rating: FLACC (Activity) - Legs 0   Pain Rating: FLACC (Activity) - Activity 0   Pain Rating: FLACC (Activity) - Cry 0   Pain Rating: FLACC (Activity) - Consolability 0   Score: FLACC (Activity) 0   Restrictions/Precautions   Weight Bearing Precautions Per Order No   Other Precautions Impulsive;1:1;Fall Risk;Cognitive   General   Chart Reviewed Yes   Response to Previous Treatment Patient unable to report, no changes reported from family or staff   Family/Caregiver Present No   Cognition   Overall Cognitive Status Impaired   Arousal/Participation Alert; Cooperative   Attention Difficulty attending to directions   Orientation Level Oriented to person;Disoriented to place; Disoriented to time;Disoriented to situation   Memory Unable to assess   Following Commands Follows one step commands inconsistently   Comments pt agreeable to PT session   Subjective   Subjective "I want to go home"   Bed Mobility   Supine to Sit Unable to assess  (pt received in hallway c nsg staff in mauricio chair)   Transfers   Sit to Stand 6  Modified independent   Stand to Sit 6  Modified independent   Additional Comments continual re-direct and TCs required throughout session for guidance as pt c poor cognition  Pt favors handheld support for amb, however no unsteadiness or LOB observed during prolonged amb this session   Pt's trunk posture also noted to be in neutral position, no lateral leaning witnessed   Ambulation/Elevation   Gait pattern WNL  (fast gait velocity noted)   Gait Assistance 6  Modified independent  (pt favors hand held assist)   Additional items Assist x 1   Assistive Device None   Distance > 1000 ft   Stair Management Assistance Not tested   Balance   Static Sitting Normal   Dynamic Sitting Good   Static Standing Good   Dynamic Standing Fair +   Ambulatory Fair +   Endurance Deficit   Endurance Deficit No   Activity Tolerance   Activity Tolerance Patient tolerated treatment well   Nurse Made Aware Yes, RN verbalized pt appropriate for PT session   Assessment   Prognosis Guarded   Assessment Pt seen for PT treatment session this date with interventions consisting of gait training w/ emphasis on improving pt's ability to ambulate level surfaces x > 1000 ft with modified independent provided by therapist with HHA for pt comfort and therapeutic activity consisting of training: sit<>stand transfers  Pt agreeable to PT treatment session upon arrival, pt found seated OOB in chair, in no apparent distress, A&O x 1 and responsive  In comparison to previous session, pt with significant improvements in dynamic/ambulatory balance and improved trunk posture during OOB activities  Post session: all needs in reach and RN notified of session findings/recommendations  From PT/mobility standpoint, pt appears to be functioning close to or at mobility baseline, therefore no further immediate skilled PT needs are warranted at this time  Pt currently performing all phases of functional mobility at independent level without need for AD  Recommend pt continue to mobilize ad ssuan while here  Recommend pt return to previous living environment once medically cleared  Will sign off, D/C PT  Please reconsult if further immediate skilled PT needs are warranted     Barriers to Discharge   (pt resident of SNF per chart)   Goals   Patient Goals "I want to go home"   Short Term Goal #1 STGs achieved at this time   Plan   Treatment/Interventions Spoke to nursing   Progress Discontinue PT   Recommendation   Recommendation 24 hour supervision/assist   PT - OK to Discharge Yes  (when medically cleared) Bonnie Leiva, PT    Time of PT treatment session: [phone]

## 2019-10-29 NOTE — PROGRESS NOTES
Patient has been ambulating in the halls all evening with 1-1 staff supervision  Had visit earlier with  and son  Oriented to self only  Minimally verbal  Behavior calm and controlled, easily redirectable  Med compliant   Will continue to monitor

## 2019-10-29 NOTE — PROGRESS NOTES
Pt does respond to verbal stimuli appropriately @ times  Q 7 min checks maintained to monitor pt's behavior & safety

## 2019-10-29 NOTE — PROGRESS NOTES
10/29/19 0951   Team Meeting   Meeting Type Daily Rounds   Patient/Family Present   Patient Present No   Patient's Family Present No     Daily Psychiatric Rounds    Team Members Present:    SINA Tilley MD, NEREYDAW  Wilfrido Quiros, MS,NCC,LPC  Philip morton, [facility]  Anabel Oliveira, RN  Parris Mosley, RN    Discussion:     Remains on 1:1 close proximity observation while awake for safety, d/t high falls risk and no safety awareness   and son were here for visit last evening  Symmetrel increased  Improvements seen in posture and during interaction with staff  Family is requesting Haldol be listed as an allergy   Will consider change to visual observation 1:1 while awake

## 2019-10-29 NOTE — PROGRESS NOTES
Patient has been sleeping well through the night  No changes  Bed alarm on for safety along with Q 7 minute safety checks

## 2019-10-29 NOTE — PLAN OF CARE
Problem: PHYSICAL THERAPY ADULT  Goal: Performs mobility at highest level of function for planned discharge setting  See evaluation for individualized goals  Description  Treatment/Interventions: Functional transfer training, LE strengthening/ROM, Therapeutic exercise, Cognitive reorientation, Patient/family training, Equipment eval/education, Gait training, Spoke to nursing, Spoke to case management  Equipment Recommended: (TBD)       See flowsheet documentation for full assessment, interventions and recommendations  Outcome: Completed  Note:   Prognosis: Guarded  Problem List: Impaired balance, Decreased cognition, Decreased safety awareness  Assessment: Pt seen for PT treatment session this date with interventions consisting of gait training w/ emphasis on improving pt's ability to ambulate level surfaces x > 1000 ft with modified independent provided by therapist with HHA for pt comfort and therapeutic activity consisting of training: sit<>stand transfers  Pt agreeable to PT treatment session upon arrival, pt found seated OOB in chair, in no apparent distress, A&O x 1 and responsive  In comparison to previous session, pt with significant improvements in dynamic/ambulatory balance and improved trunk posture during OOB activities  Post session: all needs in reach and RN notified of session findings/recommendations  From PT/mobility standpoint, pt appears to be functioning close to or at mobility baseline, therefore no further immediate skilled PT needs are warranted at this time  Pt currently performing all phases of functional mobility at independent level without need for AD  Recommend pt continue to mobilize ad susan while here  Recommend pt return to previous living environment once medically cleared  Will sign off, D/C PT  Please reconsult if further immediate skilled PT needs are warranted    Barriers to Discharge: (pt resident of SNF per chart)     Recommendation: 24 hour supervision/assist     PT - OK to Discharge: Yes(when medically cleared)    See flowsheet documentation for full assessment

## 2019-10-29 NOTE — PROGRESS NOTES
Progress Note - Terrell Jane Amado 68 y o  female MRN: [id number]  Unit/Bed#: Hellen Vincent 203-02 Encounter: [id number]    Assessment/Plan   Principal Problem:    Major neurocognitive disorder, due to multiple etiologies, with behavioral disturbance, severe (HCC)  Active Problems:    Major depressive disorder      Behavior over the last 24 hours:  improved  Sleep: normal  Appetite: normal  Medication side effects: No  ROS: no complaints    Mental Status Evaluation:  Appearance:  age appropriate   Behavior:  less anxious    Speech:  impoverished   Mood:  anxious   Affect:  constricted   Thought Process:  disorganized   Thought Content:  poverty of thoughts   Perceptual Disturbances: None   Risk Potential: none   Sensorium:  person   Cognition:  impaired due to NCD   Consciousness:  alert and awake    Attention: impaired   Insight:  impaired due to NCD   Judgment: impaired due to NCD   Gait/Station: impoved gait with reduction of leaning to one side    Motor Activity: no abnormal movements     Progress Toward Goals: gradual improvement, with reduction of EPS, brighter affect  Still pacing in the unit periodically but easily redirectable  Recommended Treatment: Continue with group therapy, milieu therapy and occupational therapy  Risks, benefits and possible side effects of Medications:   Pt is unable to understand due to her advanced NCD  Family is aware of meds trials  Medications: all current active meds have been reviewed  Labs: were reviewed    Counseling / Coordination of Care  Total floor / unit time spent today 20 minutes  Greater than 50% of total time was spent with the patient and / or family counseling and / or coordination of care

## 2019-10-29 NOTE — PROGRESS NOTES
Pt up & ambulating with supervision & assist of 1 person  Pt remains 1:1 continuous observation due to safety & behavior risks  Pt denies any depression or anxiety  Pt follows simple commands @ times & loves to smile  Q 7 min checks maintained to monitor pt's behavior & safety  Pt is oriented to self only

## 2019-10-29 NOTE — SOCIAL WORK
CM met with PT for PT check in  PT was engaged in with  minimal response, but did smile upon approach and say that she is "ok" and when prompted if there is anything she needs at this time PT stated "no"  PT appears more alert then prior

## 2019-10-29 NOTE — PLAN OF CARE
Problem: Ineffective Coping  Goal: Participates in unit activities  Description  Interventions:  - Provide therapeutic environment   - Provide required programming   - Redirect inappropriate behaviors   Outcome: Progressing   Patient has been able to tolerate groups without over stimulation; she does responded limitedly and tolerates redirection

## 2019-10-29 NOTE — PROGRESS NOTES
Nespoli,Con#  HHL:[birth year] F  VYR:[id number]    KUK:[id number]  Adm Date: 10/22/2019 0003  9:26 AM   ATT PHY: Brook Crawford, [facility]         Subjective     The patient was seen after reviewing the chart and discussing the case with caring staff  Today during our encounter, the patient reports no acute issues  No issues noted by caring staff  BP was high this morning, but she was also noted to be anxious  Objective     Vitals:    10/29/19 0720   BP: (!) 185/87   Pulse: 69   Resp: 21   Temp: 98 5 °F (36 9 °C)   SpO2: 99%       General Appearance: Awake and Alert  No acute distress  HEENT: Normocephalic, atraumatic  PERRLA, EOMI, MMM  Heart: RRR, no murmurs  Normal S1 and S2   Lungs: CTA bilaterally with fair air entry  Assessment     Fred Amado is a(n) 68y o  year old female with dementia with behavioral disturbance     1  Alzheimer's dementia with behavioral disturbance   Patient's dementia seems to be advanced   Patient's PET CT of the brain on 10/15/2015 showed suspicion for Lewy body disease  Some of patient's symptoms including visual hallucinations and mild cogwheel rigidity a sign of parkinsonism supports that diagnosis  Patient has been started on low dose amantadine  There was also a suspicion for Frontotemporal Dementia with cerebral cortical atrophy   Currently patient is on Namenda 10 mg b i d  Patient is otherwise doing well with PT   2  Hypothyroidism   Patient is on levothyroxine  3  Cardiac with history of hypertension   Losartan 25mg BID  4  GERD   Patient is on Protonix  5  Anorexia/weight loss   Patient has lost 5 lb over the past 3 weeks   I have consulted Nutrition for evaluation and treatment   Prealbumin level within normal limits  I put the patient on Ensure nutritional supplements after the meals  6  Gait abnormality   PT/OT  7  DJD/osteoarthritis   Patient may get Tylenol on as needed basis    8  Vitamin-D deficiency  Vitamin-D bolus doses for 11 more weeks followed by vitamin D3 1000 units daily  9  Vitamin B12 deficiency  Monthly B12 injections  The patient was discussed with Dr Eunice Chun and he is in agreement with the above note

## 2019-10-30 PROCEDURE — 99231 SBSQ HOSP IP/OBS SF/LOW 25: CPT | Performed by: PSYCHIATRY & NEUROLOGY

## 2019-10-30 RX ORDER — TRAZODONE HYDROCHLORIDE 50 MG/1
25 TABLET ORAL 3 TIMES DAILY
Status: DISCONTINUED | OUTPATIENT
Start: 2019-10-31 | End: 2019-11-13 | Stop reason: HOSPADM

## 2019-10-30 RX ADMIN — LORAZEPAM 0.5 MG: 0.5 TABLET ORAL at 19:47

## 2019-10-30 RX ADMIN — HYDROXYZINE HYDROCHLORIDE 25 MG: 25 TABLET ORAL at 09:06

## 2019-10-30 RX ADMIN — Medication 20 MG: at 08:15

## 2019-10-30 RX ADMIN — CITALOPRAM HYDROBROMIDE 10 MG: 10 TABLET ORAL at 08:16

## 2019-10-30 RX ADMIN — MEMANTINE 10 MG: 5 TABLET ORAL at 08:16

## 2019-10-30 RX ADMIN — ERGOCALCIFEROL 50000 UNITS: 1.25 CAPSULE ORAL at 08:18

## 2019-10-30 RX ADMIN — AMANTADINE HYDROCHLORIDE 50 MG: 50 SOLUTION ORAL at 08:15

## 2019-10-30 RX ADMIN — AMANTADINE 100 MG: 100 CAPSULE ORAL at 20:44

## 2019-10-30 RX ADMIN — MEMANTINE 10 MG: 5 TABLET ORAL at 17:31

## 2019-10-30 RX ADMIN — LEVOTHYROXINE SODIUM 88 MCG: 88 TABLET ORAL at 06:21

## 2019-10-30 RX ADMIN — LOSARTAN POTASSIUM 25 MG: 50 TABLET, FILM COATED ORAL at 08:16

## 2019-10-30 NOTE — PROGRESS NOTES
Patient is in bed sleeping at this time; she has remained in bed throughout the overnight, sleeping without difficulty  No acute behaviors exhibited  Bed remains in the lowest position with side rails up x 3  Bed alarm in place for patient safety  Will CTM via q7 minute safety checks while patient is sleeping

## 2019-10-30 NOTE — CMS CERTIFICATION NOTE
Recertification: Based upon physical, mental and social evaluations, I certify that inpatient psychiatric services continue to be medically necessary for this patient for a duration of 15 midnights for the treatment of Major depressive disorder   Available alternative community resources still do not meet the patient's mental health care needs  I further attest that an established written individualized plan of care has been updated and is outlined in the patient's medical records

## 2019-10-30 NOTE — PROGRESS NOTES
10/30/19 0954   Team Meeting   Meeting Type Daily Rounds   Team Members Present   Team Members Present Physician;Nurse;;; Occupational Therapist   Physician Team Member Dr Frederick Garcia MD, [facility], [address] [place] [address]   Nursing Team Member Annette Cooper RN   Care Management Team Member Jennifer Trejo [facility], [facility]   Social Work Team Member Lavern Mendez [facility]   OT Team Member Judieth Cranker, [place]   Patient/Family Present   Patient Present No   Patient's Family Present No     PT is fast pace walking, appears anxious, PRN provided, no longer leaning to right when walking, continues with 1 to 1 while awake, PT slept  Potential D/C next week  PT  requesting to speak with doctor  PT will return to [address],[address] once ready

## 2019-10-30 NOTE — PROGRESS NOTES
Wilton Amado#  QSY:[birth year] F  BQF:[id number]    HCP:[id number]  Adm Date: 10/22/2019 4984  9:26 AM   ATT PHY: Euntomi Dominguez, [address]         Subjective     The patient was seen after reviewing the chart and discussing the case with caring staff  Today during our encounter, the patient reports no acute issues  Objective     Vitals:    10/30/19 0751   BP: 128/67   Pulse: 55   Resp: 18   Temp: 97 9 °F (36 6 °C)   SpO2: 96%       General Appearance: Awake and Alert  No acute distress  HEENT: Normocephalic, atraumatic  PERRLA, EOMI, MMM  Heart: RRR, no murmurs  Normal S1 and S2   Lungs: CTA bilaterally with fair air entry  Assessment     [place] Amado is a(n) 68y o  year old female with dementia with behavioral disturbance     1  Alzheimer's dementia with behavioral disturbance   Patient's dementia seems to be advanced   Patient's PET CT of the brain on 10/15/2015 showed suspicion for Lewy body disease  Some of patient's symptoms including visual hallucinations and mild cogwheel rigidity a sign of parkinsonism supports that diagnosis  Patient has been started on low dose amantadine  There was also a suspicion for Frontotemporal Dementia with cerebral cortical atrophy   Currently patient is on Namenda 10 mg b i d  Patient is otherwise doing well with PT   2  Hypothyroidism   Patient is on levothyroxine  3  Cardiac with history of hypertension   Losartan 25mg BID  4  GERD   Patient is on Protonix  5  Anorexia/weight loss   Patient has lost 5 lb over the past 3 weeks   I have consulted Nutrition for evaluation and treatment   Prealbumin level within normal limits  I put the patient on Ensure nutritional supplements after the meals  6  Gait abnormality   PT/OT  7  DJD/osteoarthritis   Patient may get Tylenol on as needed basis  8  Vitamin-D deficiency  Vitamin-D bolus doses for 11 more weeks followed by vitamin D3 1000 units daily    9  Vitamin B12 deficiency  Monthly B12 injections

## 2019-10-30 NOTE — PROGRESS NOTES
[id number]  Patient confused and disoriented  Remains on continual observation to monitor safety  She has been restless, wandering, and continuously ambulating  Refused afternoon VS and refused to be still so Losartan was held  After visiting, patient became tearful and difficult to console  PRN Ativan given and it was effective  Patient appears to be without pain  Will continue monitoring

## 2019-10-30 NOTE — PROGRESS NOTES
Patient is alert but disoriented x 4  She was observed to be sitting in a recliner chair in the dining area  She is quiet; appears comfortable and in no acute distress  Patient was medication compliant at HS taking her medications in pudding without issue  No outbursts or agitation observed  She is calm and controlled at present  Will CT via q7 minute safety checks

## 2019-10-30 NOTE — PROGRESS NOTES
Progress Note - 275 Leopoldo Amado 68 y o  female MRN: [id number]   Unit/Bed#: [facility] 203-02 Encounter: [id number]    Behavior over the last 24 hours: slowly improving  Jhon Tolentino was seen today  She is mildly anxious but able to follow easily staff redirection  She is more verbal at times but remains restless and continues pacing in the unit back and fort  She is more expressive and shows minimal leaning of her body to one side presently  Staff report limited participation in groups due to her advanced cognitive function decline  Sleep: improved  Appetite: increased  Medication side effects: none   ROS: no complaints    Mental Status Evaluation:    Appearance:  age appropriate   Behavior:  restless   Speech:  incoherent   Mood:  anxious   Affect:  constricted   Thought Process:  disorganized   Associations: incoherent   Thought Content:  no overt delusions   Perceptual Disturbances: none   Risk Potential: Suicidal ideation - None  Homicidal ideation - None   Sensorium:  oriented to person   Memory:  poor   Consciousness:  alert and awake   Attention: poor concentration and poor attention span   Insight:  impaired   Judgment: impaired   Gait/Station: unstable gait   Motor Activity: no abnormal movements     Vital signs in last 24 hours:    Temp:  [97 9 °F (36 6 °C)-98 2 °F (36 8 °C)] 97 9 °F (36 6 °C)  HR:  [55-79] 55  Resp:  [18-19] 18  BP: (125-132)/(67-81) 128/67    Laboratory results: I have personally reviewed all pertinent laboratory/tests results  Assessment/Plan   Principal Problem:    Major depressive disorder  Active Problems:    Major neurocognitive disorder, due to multiple etiologies, with behavioral disturbance, severe (HCC)    Recommended Treatment:     Planned medication and treatment changes:     All current active medications have been reviewed  Encourage group therapy, milieu therapy and occupational therapy  Behavioral Health checks every 7 minutes    Current Facility-Administered Medications:  acetaminophen 975 mg Oral Q6H PRN Philbert Hopper, CRNP   aluminum-magnesium hydroxide-simethicone 30 mL Oral Q4H PRN Philbert Hopper, CRNP   amantadine 100 mg Oral HS Jacques Welch MD   amantadine 50 mg Oral Daily Jacques Welch MD   [START ON 12/25/2019] cholecalciferol 1,000 Units Oral Daily Walton Hodgkins, MD   citalopram 10 mg Oral Daily Philbert Hopper, CRNP   cyanocobalamin 1,000 mcg Intramuscular Q30 Days Walton Hodgkins, MD   ergocalciferol 50,000 Units Oral Weekly Walton Hodgkins, MD   hydrOXYzine HCL 25 mg Oral Q4H PRN Philbert Hopper, CRNP   ibuprofen 200 mg Oral Q8H PRN Philbert Hopper, CRNP   ibuprofen 400 mg Oral Q8H PRN Philbert Hopper, CRNP   levothyroxine 88 mcg Oral Early Morning Philbert Hopper, CRNP   LORazepam 1 mg Intramuscular Q4H PRN Philbert Hopper, CRNP   LORazepam 0 5 mg Oral Q6H PRN Philbert Hopper, CRNP   losartan 25 mg Oral BID [facility] fine   magnesium hydroxide 30 mL Oral Daily PRN Philbert Hopper, CRNP   memantine 10 mg Oral BID Walton Hodgkins, MD   OLANZapine 5 mg Intramuscular Q8H PRN Philbert Hopper, CRNP   OLANZapine 5 mg Oral Q8H PRN Philbert Hopper, CRNP   omeprazole (PRILOSEC) suspension 2 mg/mL 20 mg Oral Daily Walton Hodgkins, MD   traZODone 25 mg Oral BID PRN Jacques Welch MD   traZODone 50 mg Oral HS PRN [facility] Ho[facility], CRNP       Risks / Benefits of Treatment:    Pt is unable to understand due to her advanced NCD  Family is being updated about meds management  Counseling / Coordination of Care:    Patient's progress discussed with staff in treatment team meeting  Medications, treatment progress and treatment plan reviewed with patient      Mirna Goltz, MD 10/30/19

## 2019-10-30 NOTE — PLAN OF CARE
Problem: Ineffective Coping  Goal: Participates in unit activities  Description  Interventions:  - Provide therapeutic environment   - Provide required programming   - Redirect inappropriate behaviors   Outcome: Progressing   Patient tolerates groups; but minimally interacts and responses are disorganized

## 2019-10-30 NOTE — PLAN OF CARE
PT progressing, potential D/C for next week  Family is looking at [facility]

## 2019-10-30 NOTE — PROGRESS NOTES
Pt remains 1:1 continuous visual observation due to safety & behavior risks  Pt medicated for increased anxiety @ 0906 as ordered by MD with Atarax po  Pt continues to pace in zhou under supervision of 1:1 observer  Pt denies any pain, depression or anxiety, but appears anxious @ this time  Q 7 min checks maintained to monitor pt's behavior & safety  Pt's gait is steady & not leaning to rt side @ present

## 2019-10-31 RX ORDER — DIVALPROEX SODIUM 125 MG/1
125 CAPSULE, COATED PELLETS ORAL 3 TIMES DAILY
Status: DISCONTINUED | OUTPATIENT
Start: 2019-10-31 | End: 2019-11-04

## 2019-10-31 RX ADMIN — TRAZODONE HYDROCHLORIDE 25 MG: 50 TABLET ORAL at 08:37

## 2019-10-31 RX ADMIN — MEMANTINE 10 MG: 5 TABLET ORAL at 17:08

## 2019-10-31 RX ADMIN — TRAZODONE HYDROCHLORIDE 25 MG: 50 TABLET ORAL at 17:08

## 2019-10-31 RX ADMIN — DIVALPROEX SODIUM 125 MG: 125 CAPSULE, COATED PELLETS ORAL at 17:08

## 2019-10-31 RX ADMIN — AMANTADINE HYDROCHLORIDE 50 MG: 50 SOLUTION ORAL at 08:47

## 2019-10-31 RX ADMIN — Medication 20 MG: at 08:47

## 2019-10-31 RX ADMIN — DIVALPROEX SODIUM 125 MG: 125 CAPSULE, COATED PELLETS ORAL at 20:49

## 2019-10-31 RX ADMIN — MEMANTINE 10 MG: 5 TABLET ORAL at 08:36

## 2019-10-31 RX ADMIN — CITALOPRAM HYDROBROMIDE 10 MG: 10 TABLET ORAL at 08:36

## 2019-10-31 RX ADMIN — TRAZODONE HYDROCHLORIDE 25 MG: 50 TABLET ORAL at 20:48

## 2019-10-31 RX ADMIN — AMANTADINE 100 MG: 100 CAPSULE ORAL at 20:48

## 2019-10-31 RX ADMIN — LOSARTAN POTASSIUM 25 MG: 50 TABLET, FILM COATED ORAL at 17:08

## 2019-10-31 RX ADMIN — LOSARTAN POTASSIUM 25 MG: 50 TABLET, FILM COATED ORAL at 08:37

## 2019-10-31 NOTE — PROGRESS NOTES
Patient maintained on continual visual observation while patient awake for high fall risk, poor safety awareness, poor insight and confusion  The patient noted to crying, increasingly anxious, and yelling at time of shower provided by clinical staff  Clinical staff provided redirection, support, decrease in environmental stimuli following shower, interventions noted to be effective  The patient noted to be more quiet and calm following interventions provided  The patient noted to be intermittently anxious throughout the remainder of the shift  The patient ambulated with assistance of clinical staff, gait noted to be steady, but intermittently pace of gait noted to increase requiring redirection and verbal cuing by clinical staff  The patient noted to be compliant with meals and medications  Will continue to monitor

## 2019-10-31 NOTE — PROGRESS NOTES
10/31/19 0951   Team Meeting   Meeting Type Daily Rounds   Patient/Family Present   Patient Present No   Patient's Family Present No     Daily Psychiatric Rounds    Team Members Present:    SINA Zhou MD Husbands, LSW  Tiki Vela, MARIOLA Hardin, MARIOLA Aiken, MARIOLA    Discussion:     1:1 visual Continual observation while awake maintained  Yelling out last evening  Anxious and nervous this am  Medication compliant   CM to check on facility ability to accommodate a one to one during waking hours, as is currently needed for safety

## 2019-10-31 NOTE — PROGRESS NOTES
Nespoli,Con#  YFY:[birth year] F  [id number]:[id number]    [id number]:[id number]  Adm Date: 10/22/2019 4115  9:26 AM   ATT PHY: Sam Ge, [address]         Subjective     The patient was seen after reviewing the chart and discussing the case with caring staff  Today during our encounter, the patient reports no acute issues  Patient is eating 75% of her meals  Objective     Vitals:    10/31/19 0700   BP: 142/88   Pulse: 95   Resp: 20   Temp: 98 2 °F (36 8 °C)   SpO2: 96%       General Appearance: Awake and Alert  No acute distress  HEENT: Normocephalic, atraumatic  PERRLA, EOMI, MMM  Heart: RRR, no murmurs  Normal S1 and S2   Lungs: CTA bilaterally with fair air entry  Assessment     Marisa Amado is a(n) 68y o  year old female with dementia with behavioral disturbance     1  Alzheimer's dementia with behavioral disturbance   Patient's dementia seems to be advanced   Patient's PET CT of the brain on 10/15/2015 showed suspicion for Lewy body disease  Some of patient's symptoms including visual hallucinations and mild cogwheel rigidity a sign of parkinsonism supports that diagnosis  Patient has been started on low dose amantadine  There was also a suspicion for Frontotemporal Dementia with cerebral cortical atrophy   Currently patient is on Namenda 10 mg b i d  Patient is otherwise doing well with PT   2  Hypothyroidism   Patient is on levothyroxine  3  Cardiac with history of hypertension   Losartan 25mg BID  4  GERD   Patient is on Protonix  5  Anorexia/weight loss   Patient has lost 5 lb over the past 3 weeks   I have consulted Nutrition for evaluation and treatment   Prealbumin level within normal limits  I put the patient on Ensure nutritional supplements after the meals  6  Gait abnormality   PT/OT  7  DJD/osteoarthritis   Patient may get Tylenol on as needed basis  8  Vitamin-D deficiency   Vitamin-D bolus doses for 11 more weeks followed by vitamin D3 1000 units daily  9  Vitamin B12 deficiency  Monthly B12 injections

## 2019-10-31 NOTE — PLAN OF CARE
Problem: Ineffective Coping  Goal: Participates in unit activities  Description  Interventions:  - Provide therapeutic environment   - Provide required programming   - Redirect inappropriate behaviors   Outcome: Progressing   Able to remain present for much of group  Concentration/attention and comprehension limited  Difficulty engaging in process

## 2019-10-31 NOTE — PROGRESS NOTES
Progress Note - 275 Leopoldo Amado 68 y o  female MRN: [id number]   Unit/Bed#: [facility] 203-02 Encounter: [id number]    Behavior over the last 24 hours: mae Solis continues to have periods of increased restlessness and pacing, appearing apprehensive  Staff report she was fearful and yelling out last evening during visiting hours and then again this morning with AM care  She continues on 1:1 for excessive pacing and will wander into others rooms and will continue to pace while unsteady with no safety awareness  Will add Depakote sprinkles TID for mood lability and psychomotor agitation  Minimal participation in milieu due to advanced dementia  Sleep: improved, slept better  Appetite: fair  Medication side effects: No   ROS: no complaints    Mental Status Evaluation:    Appearance:  age appropriate, casually dressed   Behavior:  psychomotor agitation, restless, evasive   Speech:  incoherent   Mood:  dysphoric, anxious   Affect:  constricted   Thought Process:  disorganized   Associations: non-verbal   Thought Content:  mild paranoia   Perceptual Disturbances: appears preoccupied, not observed   Risk Potential: Suicidal ideation - unable to assess  Homicidal ideation - unable to assess  Potential for aggression - Not at present   Sensorium:  oriented to person   Memory:  recent memory impaired, remote memory impaired   Consciousness:  alert and awake   Attention: poor concentration and poor attention span   Insight:  nil   Judgment: poor   Gait/Station: normal gait/station, normal balance   Motor Activity: no abnormal movements     Vital signs in last 24 hours:    Temp:  [97 9 °F (36 6 °C)-98 2 °F (36 8 °C)] 98 2 °F (36 8 °C)  HR:  [69-95] 95  Resp:  [19-20] 20  BP: (131-142)/(65-88) 142/88    Laboratory results: I have personally reviewed all pertinent laboratory/tests results      Suicide/Homicide Risk Assessment:    Risk of Harm to Self:   Current Specific Risk Factors include: impaired cognition, poor impulse control  Protective Factors: no current suicidal ideation, responds to redirection, compliant with medications, supportive family  Based on today's assessment, Juana Diazer April presents the following risk of harm to self: low    Risk of Harm to Others:  Current Specific Risk Factors include: none  Protective Factors: no current homicidal ideation  Based on today's assessment, Genovevaer April presents the following risk of harm to others: none    The following interventions are recommended: continued hospitalization on locked unit, continual observation visual     Progress Toward Goals: minimal improvement, insight remains poor, does not participate in milieu therapy, does not participate in groups    Assessment/Plan   Principal Problem:    Major depressive disorder  Active Problems:    Major neurocognitive disorder, due to multiple etiologies, with behavioral disturbance, severe ([facility] [address] )    Recommended Treatment:     Planned medication and treatment changes:     All current active medications have been reviewed  Encourage group therapy, milieu therapy and occupational therapy  Behavioral Health checks every 7 minutes  Add Depakote sprinkles 125 mg TID    Current Facility-Administered Medications:  acetaminophen 975 mg Oral Q6H PRN Zygmunt Bilberry, CRNP   aluminum-magnesium hydroxide-simethicone 30 mL Oral Q4H PRN Zygmunt Bilberry, CRNP   amantadine 100 mg Oral HS Dariana Mojica MD   amantadine 50 mg Oral Daily Dariana Mojica MD   [START ON 12/25/2019] cholecalciferol 1,000 Units Oral Daily Dixie Cr MD   citalopram 10 mg Oral Daily Zygmunt Bilberry, CRNP   cyanocobalamin 1,000 mcg Intramuscular Q30 Days Dixie Cr MD   ergocalciferol 50,000 Units Oral Weekly Dixie Cr MD   hydrOXYzine HCL 25 mg Oral Q4H PRN Zygmunt Bilberry, CRNP   ibuprofen 200 mg Oral Q8H PRN Zygmunt Bilberry, CRNP   ibuprofen 400 mg Oral Q8H PRN Zygmunt Bilberry, CRNP   levothyroxine 88 mcg Oral Early Morning Yu Bridegroom, CRNP   LORazepam 1 mg Intramuscular Q4H PRN Yu Bridegroom, CRNP   LORazepam 0 5 mg Oral Q6H PRN Yu Bridegroom, CRNP   losartan 25 mg Oral BID Lino Huber PA-C   magnesium hydroxide 30 mL Oral Daily PRN Yu Bridegroom, CRNP   memantine 10 mg Oral BID Ruth Do MD   OLANZapine 5 mg Intramuscular Q8H PRN Yu Bridegroom, CRNP   OLANZapine 5 mg Oral Q8H PRN Uy Bridegroom, CRNP   omeprazole (PRILOSEC) suspension 2 mg/mL 20 mg Oral Daily uRth Do MD   traZODone 25 mg Oral TID Eun Dominguez MD   traZODone 50 mg Oral HS PRN [facility] Amnaegroom, CRNP       Risks / Benefits of Treatment:    Risks, benefits, and possible side effects of medications explained to patient  Patient has limited understanding of risks and benefits of treatment at this time and needs ongoing explanation of treatment benefits and treatment plan  Counseling / Coordination of Care:    Patient's progress discussed with staff in treatment team meeting  Medications, treatment progress and treatment plan reviewed with patient      SINA Quintana 10/31/19

## 2019-11-01 PROCEDURE — 99232 SBSQ HOSP IP/OBS MODERATE 35: CPT | Performed by: PSYCHIATRY & NEUROLOGY

## 2019-11-01 RX ORDER — ECHINACEA PURPUREA EXTRACT 125 MG
1 TABLET ORAL EVERY 4 HOURS PRN
Status: DISCONTINUED | OUTPATIENT
Start: 2019-11-01 | End: 2019-11-13 | Stop reason: HOSPADM

## 2019-11-01 RX ORDER — GUAIFENESIN/DEXTROMETHORPHAN 100-10MG/5
10 SYRUP ORAL EVERY 4 HOURS PRN
Status: DISCONTINUED | OUTPATIENT
Start: 2019-11-01 | End: 2019-11-13 | Stop reason: HOSPADM

## 2019-11-01 RX ADMIN — Medication 20 MG: at 08:52

## 2019-11-01 RX ADMIN — ACETAMINOPHEN 975 MG: 325 TABLET ORAL at 17:42

## 2019-11-01 RX ADMIN — TRAZODONE HYDROCHLORIDE 25 MG: 50 TABLET ORAL at 20:51

## 2019-11-01 RX ADMIN — DIVALPROEX SODIUM 125 MG: 125 CAPSULE, COATED PELLETS ORAL at 20:51

## 2019-11-01 RX ADMIN — TRAZODONE HYDROCHLORIDE 25 MG: 50 TABLET ORAL at 16:23

## 2019-11-01 RX ADMIN — LOSARTAN POTASSIUM 25 MG: 50 TABLET, FILM COATED ORAL at 16:23

## 2019-11-01 RX ADMIN — DIVALPROEX SODIUM 125 MG: 125 CAPSULE, COATED PELLETS ORAL at 16:23

## 2019-11-01 RX ADMIN — LOSARTAN POTASSIUM 25 MG: 50 TABLET, FILM COATED ORAL at 08:51

## 2019-11-01 RX ADMIN — CITALOPRAM HYDROBROMIDE 10 MG: 10 TABLET ORAL at 08:51

## 2019-11-01 RX ADMIN — MEMANTINE 10 MG: 5 TABLET ORAL at 16:23

## 2019-11-01 RX ADMIN — MEMANTINE 10 MG: 5 TABLET ORAL at 08:51

## 2019-11-01 RX ADMIN — TRAZODONE HYDROCHLORIDE 25 MG: 50 TABLET ORAL at 08:51

## 2019-11-01 RX ADMIN — DIVALPROEX SODIUM 125 MG: 125 CAPSULE, COATED PELLETS ORAL at 08:51

## 2019-11-01 RX ADMIN — AMANTADINE 100 MG: 100 CAPSULE ORAL at 20:50

## 2019-11-01 RX ADMIN — LEVOTHYROXINE SODIUM 88 MCG: 88 TABLET ORAL at 06:17

## 2019-11-01 NOTE — PROGRESS NOTES
Patient noted to have intermittent dry cough and noted to intermittently be spitting clear secretions in tissue throughout shift  RN placed call to Bertin Styles pa-c to provide notification of noted assessment, new orders noted  Will continue to monitor

## 2019-11-01 NOTE — PROGRESS NOTES
Continual observation maintained to monitor for behaviors, safety, confusion, high fall risk, poor insight and safety awareness  The patient noted to be quiet, anxious and restless  Flacc score 0, no verbal or non-verbal complaints of pain noted  The patient noted to continually pace on the unit, gait alternating between slow and steady and increased pace  The patient noted to slightly lean to the right and requires assistance and verbal cuing to slow gait from continual observer to maintain safe ambulation  The patient requires much encouragement and assistance from clinical staff to sit in chair for brief periods of rest  The patient's appetite fair, meal percentages 50% for both breakfast and lunch  The patient compliant with medication administration  Will continue to monitor

## 2019-11-01 NOTE — CASE MANAGEMENT
Patient highly anxious, crying hysterically, wailing, poorly redirected upon awakening  Support, toileting, diversion, ambulation ineffective in sx reduction  Ativan 0 5 mg given po crushed in ice cream  Patient showered and calmer within 20 minutes  Able to eat moderate amount of breakfast with prompting  Did take AM meds crushed in ice cream  1:1 observation for safety maintained

## 2019-11-01 NOTE — PROGRESS NOTES
11/01/19 0954   Team Meeting   Meeting Type Daily Rounds   Team Members Present   Team Members Present Physician;Nurse;;; Occupational Therapist   Physician Team Member Dr Katelin Honeycutt MD; Rubio Barbosa [address] [place] [address]    Nursing Team Member Ayush Whitley RN   Care Management Team Member Camila Rai Rn    Social Work Team Member Annette Murray [place]    OT Team Member [place], [place]    Patient/Family Present   Patient Present No   Patient's Family Present No     1:1 visual observation while awake; anxious and fearful; ambulates; slept all night; med compliant

## 2019-11-01 NOTE — PROGRESS NOTES
Assumed care of this patient from prior shift RN at this time  Patient is in bed sleeping; appears to be comfortable lying on her right side  No obvious signs of distress observed  Side rails up x 3 with bed in lowest position  Bed alarm in place for patient safety  Will CTM via q7 minute safety checks

## 2019-11-01 NOTE — PROGRESS NOTES
Wilton Amado#  APB:[birth year] F  CUV:[id number]    HWV:[id number]  Adm Date: 10/22/2019 8906  9:26 AM   ATT PHY: Dick Dawkins, [address]         Subjective     The patient was seen after reviewing the chart and discussing the case with caring staff  Today during our encounter, the patient reports no acute issues  She is largely non-verbal during the encounter  No new issues noted by caring staff  Objective     Vitals:    11/01/19 0824   BP: 154/70   Pulse: 73   Resp: 20   Temp: 98 7 °F (37 1 °C)   SpO2: 96%       General Appearance: Awake and Alert  No acute distress  HEENT: Normocephalic, atraumatic  PERRLA, EOMI, MMM  Heart: RRR, no murmurs  Normal S1 and S2   Lungs: CTA bilaterally with fair air entry  Assessment     Gaston Amado is a(n) 68y o  year old female with dementia with behavioral disturbance     1  Alzheimer's dementia with behavioral disturbance   Patient's dementia seems to be advanced   Patient's PET CT of the brain on 10/15/2015 showed suspicion for Lewy body disease  Some of patient's symptoms including visual hallucinations and mild cogwheel rigidity a sign of parkinsonism supports that diagnosis  Patient is on low dose amantadine  There was also a suspicion for Frontotemporal Dementia with cerebral cortical atrophy   Currently patient is on Namenda 10 mg b i d  Patient is otherwise doing well with PT   2  Hypothyroidism   Levothyroxine  3  Cardiac with history of hypertension   Losartan 25mg BID  4  GERD    Protonix  5  Anorexia/weight loss   Patient has lost 5 lb over the past 3 weeks   I have consulted Nutrition for evaluation and treatment  Continue Ensure nutritional supplements after the meals  6  Gait abnormality   PT/OT  7  DJD/osteoarthritis   Patient may get Tylenol on as needed basis  8  Vitamin-D deficiency  Vitamin-D bolus doses for 11 more weeks followed by vitamin D3 1000 units daily    9  Vitamin B12 deficiency  Monthly B12 injections  The patient was discussed with Dr Mesfin Hwang and he is in agreement with the above note

## 2019-11-01 NOTE — PROGRESS NOTES
Progress Note - [address] Le[address] Amado 68 y o  female MRN: [id number]   Unit/Bed#: [facility] 203-02 Encounter: [id number]    Behavior over the last 24 hours: slowly improving  Mikey Villegas was seen today  She appears calmer and able to follow redirection easily  She continues pacing with  in the unit  Staff reports no yelling out episode overnight  Pt has tolerated initial Depakote doses well  Staff report limited participation in groups and on the unit due to her advanced NCD  Sleep: improved  Appetite: fair  Medication side effects: none   ROS: no acute focal neurological or change of MS noted    Mental Status Evaluation:    Appearance:  age appropriate   Behavior:  less agitated   Speech:  disorganized   Mood:  dysphoric   Affect:  reactive   Thought Process:  disorganized   Associations: unable to assess - does not answer   Thought Content:  poverty of thought   Perceptual Disturbances: none   Risk Potential: Suicidal ideation - None  Homicidal ideation - None   Sensorium:  oriented to person   Memory:  impaired   Consciousness:  alert and awake   Attention: poor concentration and poor attention span   Insight:  impaired   Judgment: impaired   Gait/Station: unstable gait    Motor Activity: no abnormal movements     Vital signs in last 24 hours:    Temp:  [98 2 °F (36 8 °C)-98 7 °F (37 1 °C)] 98 7 °F (37 1 °C)  HR:  [61-73] 73  Resp:  [16-20] 20  BP: (154-160)/(70-81) 154/70    Laboratory results: I have personally reviewed all pertinent laboratory/tests results  Assessment/Plan   Principal Problem:    Major depressive disorder  Active Problems:    Major neurocognitive disorder, due to multiple etiologies, with behavioral disturbance, severe (HCC)    Recommended Treatment:     Planned medication and treatment changes:     All current active medications have been reviewed  Encourage group therapy, milieu therapy and occupational therapy  Behavioral Health checks every 7 minutes Current Facility-Administered Medications:  acetaminophen 975 mg Oral Q6H PRN Rj Luis, CRNP   aluminum-magnesium hydroxide-simethicone 30 mL Oral Q4H PRN Rj Luis, CRNP   amantadine 100 mg Oral HS Kody Rea MD   [START ON 12/25/2019] cholecalciferol 1,000 Units Oral Daily Doug Alarcon MD   citalopram 10 mg Oral Daily Rj Luis, CRNP   cyanocobalamin 1,000 mcg Intramuscular Q30 Days Doug Alarcon MD   divalproex sodium 125 mg Oral TID Kody Rea MD   ergocalciferol 50,000 Units Oral Weekly Doug Alarcon MD   hydrOXYzine HCL 25 mg Oral Q4H PRN Rj Luis, CRNP   ibuprofen 200 mg Oral Q8H PRN Rj Luis, CRNP   ibuprofen 400 mg Oral Q8H PRN Rj Luis, CRNP   levothyroxine 88 mcg Oral Early Morning Rj Luis, CRNP   LORazepam 1 mg Intramuscular Q4H PRN Rj Luis, CRNP   LORazepam 0 5 mg Oral Q6H PRN Rj Luis, CRNP   losartan 25 mg Oral BID Elizabeth Huber PA-C   magnesium hydroxide 30 mL Oral Daily PRN Rj Luis, CRNP   memantine 10 mg Oral BID Doug Alarcon MD   OLANZapine 5 mg Intramuscular Q8H PRN Rj Luis, CRNP   OLANZapine 5 mg Oral Q8H PRN Rj Luis, CRNP   omeprazole (PRILOSEC) suspension 2 mg/mL 20 mg Oral Daily Doug Alarcon MD   traZODone 25 mg Oral TID Kody Rea MD   traZODone 50 mg Oral HS PRN Rj Luis, CRNP       Risks / Benefits of Treatment:    Pt is unable to understand due to advanced NCD   is in knowledge of meds adjustment     Counseling / Coordination of Care:    Patient's progress discussed with staff in treatment team meeting  Medications, treatment progress and treatment plan reviewed with patient      Noe Da Silva MD 11/01/19

## 2019-11-01 NOTE — CASE MANAGEMENT
KATHLEEN spoke with Jose Daniel Vera, patient's  to clarify d/c planning related to facility of choice  Mr Leeroy Sands stated that "my wife is returning to [address]", as the family will continue to pursue the possibility of alternate arrangements, should the need and opportunity arise  He is aware of the patient's safety risk and the facility limitations related to close monitoring  He stated that he spoke with the facility this am and made them aware of his position/expectations  He knows they are unable to provide 1:1 supervision, and he is unable to manage the cost of private duty  He stated that he has observed her gait improvement since discontinuing the previous anti-psychotic medication and informed the facility that he does not want it restarted once she is transferred back  He is hopeful that the current medication addition will be helpful in adverse sx management  CM informed patient's  of intent to call the facility as a follow-up, providing an update and determination for any reassessment  He was in agreement; no further concerns or issues voiced

## 2019-11-01 NOTE — PROGRESS NOTES
Patient remains in bed sleeping at this time  No acute behaviors exhibited  No restlessness observed  Bed remains in the lowest position with side rails upx 3  Will CTM via q7 minute safety checks

## 2019-11-01 NOTE — CASE MANAGEMENT
KATHLEEN placed call to [facility] Unit: [phone]  Ns transferred CM to Darwin Kevin, [address]  CM provided status report to include CM contact with the patient's  who is aware of the facility's inability to provide 1:1 supervision, and the fact that he was not in a position to assume private pay for the service  She was made aware that he was in agreement with her return under those circumstances  Sleep and appetite issues discussed, to include a recent order for a Nutritional consult and the addition of Ensure supplement  Although the DON does not require an onsite reassess prior to d/c, she did request an update prior to d/c and provided her direct line: [phone]

## 2019-11-01 NOTE — PROGRESS NOTES
[id number]  Patient remains on continual observation for safety reasons  Patient was ambulating about the unit for several hours and then was encouraged to rest in a recliner chair  Patient answers questions appropriately at times but otherwise is nonsensical  Was easily redirectable when upset and requires no PRNs  Appears comfortable  Will continue monitoring

## 2019-11-01 NOTE — PLAN OF CARE
Problem: Ineffective Coping  Goal: Participates in unit activities  Description  Interventions:  - Provide therapeutic environment   - Provide required programming   - Redirect inappropriate behaviors   Outcome: Not Progressing   Patient more scared and anxious today; continues to ambulate around unit with 1:1

## 2019-11-02 PROBLEM — Z87.442 HISTORY OF KIDNEY STONES: Status: ACTIVE | Noted: 2017-11-16

## 2019-11-02 PROBLEM — F02.81: Chronic | Status: ACTIVE | Noted: 2019-10-23

## 2019-11-02 PROBLEM — N81.2 UTEROVAGINAL PROLAPSE, INCOMPLETE: Status: ACTIVE | Noted: 2018-04-17

## 2019-11-02 PROBLEM — F32.9 MAJOR DEPRESSIVE DISORDER: Chronic | Status: ACTIVE | Noted: 2019-10-23

## 2019-11-02 PROBLEM — H01.022 SQUAMOUS BLEPHARITIS RIGHT LOWER EYELID: Status: ACTIVE | Noted: 2019-11-02

## 2019-11-02 PROBLEM — F33.3 MAJOR DEPRESSIVE DISORDER, RECURRENT, SEVERE WITH PSYCHOTIC FEATURES (HCC): Status: ACTIVE | Noted: 2019-10-23

## 2019-11-02 PROBLEM — I12.9 HYPERTENSIVE KIDNEY DISEASE WITH CHRONIC KIDNEY DISEASE STAGE III (HCC): Status: ACTIVE | Noted: 2019-07-31

## 2019-11-02 PROBLEM — H26.493 OTHER SECONDARY CATARACT, BILATERAL: Status: ACTIVE | Noted: 2019-11-02

## 2019-11-02 PROBLEM — N81.12 CYSTOCELE, LATERAL: Status: ACTIVE | Noted: 2018-04-17

## 2019-11-02 PROBLEM — K55.9 ISCHEMIC COLITIS (HCC): Status: ACTIVE | Noted: 2017-05-02

## 2019-11-02 PROBLEM — N18.30 HYPERTENSIVE KIDNEY DISEASE WITH CHRONIC KIDNEY DISEASE STAGE III (HCC): Status: ACTIVE | Noted: 2019-07-31

## 2019-11-02 PROBLEM — F33.3 MAJOR DEPRESSIVE DISORDER, RECURRENT, SEVERE WITH PSYCHOTIC FEATURES (HCC): Chronic | Status: ACTIVE | Noted: 2019-10-23

## 2019-11-02 PROCEDURE — 99231 SBSQ HOSP IP/OBS SF/LOW 25: CPT | Performed by: NURSE PRACTITIONER

## 2019-11-02 RX ORDER — LORAZEPAM 2 MG/ML
1 INJECTION INTRAMUSCULAR EVERY 4 HOURS PRN
Status: DISCONTINUED | OUTPATIENT
Start: 2019-11-02 | End: 2019-11-13 | Stop reason: HOSPADM

## 2019-11-02 RX ORDER — LORAZEPAM 0.5 MG/1
0.5 TABLET ORAL EVERY 6 HOURS PRN
Status: DISCONTINUED | OUTPATIENT
Start: 2019-11-02 | End: 2019-11-02

## 2019-11-02 RX ORDER — LORAZEPAM 0.5 MG/1
0.5 TABLET ORAL EVERY 6 HOURS PRN
Status: DISCONTINUED | OUTPATIENT
Start: 2019-11-02 | End: 2019-11-13 | Stop reason: HOSPADM

## 2019-11-02 RX ADMIN — LEVOTHYROXINE SODIUM 88 MCG: 88 TABLET ORAL at 06:27

## 2019-11-02 RX ADMIN — MEMANTINE 10 MG: 5 TABLET ORAL at 18:20

## 2019-11-02 RX ADMIN — LORAZEPAM 0.5 MG: 0.5 TABLET ORAL at 18:29

## 2019-11-02 RX ADMIN — ACETAMINOPHEN 975 MG: 325 TABLET ORAL at 20:53

## 2019-11-02 RX ADMIN — LOSARTAN POTASSIUM 25 MG: 50 TABLET, FILM COATED ORAL at 18:20

## 2019-11-02 RX ADMIN — DIVALPROEX SODIUM 125 MG: 125 CAPSULE, COATED PELLETS ORAL at 09:27

## 2019-11-02 RX ADMIN — DIVALPROEX SODIUM 125 MG: 125 CAPSULE, COATED PELLETS ORAL at 18:21

## 2019-11-02 RX ADMIN — DIVALPROEX SODIUM 125 MG: 125 CAPSULE, COATED PELLETS ORAL at 20:54

## 2019-11-02 RX ADMIN — TRAZODONE HYDROCHLORIDE 25 MG: 50 TABLET ORAL at 20:54

## 2019-11-02 RX ADMIN — OLANZAPINE 5 MG: 5 TABLET, FILM COATED ORAL at 09:40

## 2019-11-02 RX ADMIN — TRAZODONE HYDROCHLORIDE 25 MG: 50 TABLET ORAL at 18:20

## 2019-11-02 RX ADMIN — AMANTADINE 100 MG: 100 CAPSULE ORAL at 20:54

## 2019-11-02 NOTE — PROGRESS NOTES
[id number]  Patient remains on continual observation for safety reasons  Patient ambulating throughout the unit this shift  She leans to her right but is steady for the most part  Patient became increasingly anxious prior to dinner and was complaning of a headache  Patient was given Tylenol and her observer encouraged patient to walk slower and take deep breaths  The Tylenol seemed to relieve her pain and she then calmed down somewhat and did not require any PRNs  No outbursts noted today  No coughing or sniffling noted  She is compliant with medications crushed in pudding  Will continue monitoring

## 2019-11-02 NOTE — PROGRESS NOTES
Wilton Amado#  AOF:0/7/1938 F  MARLYS:[id number]    RSF:[id number]  Adm Date: 10/22/2019 1438  9:26 AM   ATT PHY: Mayra Ana, [facility]         Subjective     The patient was seen after reviewing the chart and discussing the case with caring staff  Patient examined at bedside  Patient stayed come during this encounter but it was reported that earlier today patient had a terrible fit episode  Patient spat on her caring staff nurse  Objective     Vitals:    11/02/19 1116   BP: 111/59   Pulse: 63   Resp: 18   Temp: 98 7 °F (37 1 °C)   SpO2: 96%       General Appearance: Awake and Alert  No acute distress  HEENT: Normocephalic, atraumatic  PERRLA, EOMI, MMM  Heart: RRR, no murmurs  Normal S1 and S2   Lungs: CTA bilaterally with fair air entry  Assessment     Oh Rose Aneudy is a(n) 68y o  year old female with dementia with behavioral disturbance     1  Alzheimer's dementia with behavioral disturbance   Patient's dementia seems to be advanced   Patient's PET CT of the brain on 10/15/2015 showed suspicion for Lewy body disease  Some of patient's symptoms including visual hallucinations and mild cogwheel rigidity a sign of parkinsonism supports that diagnosis  Patient is on low dose amantadine  There was also a suspicion for Frontotemporal Dementia with cerebral cortical atrophy   Currently patient is on Namenda 10 mg b i d  Patient is otherwise doing well with PT   2  Hypothyroidism   Levothyroxine  3  Cardiac with history of hypertension   Losartan 25mg BID  4  GERD    Protonix  5  Anorexia/weight loss   Patient has lost 5 lb over the past 3 weeks   I have consulted Nutrition for evaluation and treatment  Continue Ensure nutritional supplements after the meals  6  Gait abnormality   PT/OT  7  DJD/osteoarthritis   Patient may get Tylenol on as needed basis  8  Vitamin-D deficiency   Vitamin-D bolus doses for 11 more weeks followed by vitamin D3 1000 units daily  9  Vitamin B12 deficiency  Monthly B12 injections

## 2019-11-02 NOTE — PROGRESS NOTES
[id number]  Patient was able to sleep through the night without issue  No acute behaviors exhibited  No agitation or aggression observed  No PRN medications needed  Bed alarm in place for patient safety  Bed remains in lowest position with side rails up x3  Q7 minute safety checks in progress

## 2019-11-02 NOTE — PLAN OF CARE
Problem: BEHAVIOR  Goal: Pt/Family maintain appropriate behavior and adhere to behavioral management agreement, if implemented  Description  INTERVENTIONS:  - Assess the family dynamic   - Encourage verbalization of thoughts and concerns in a socially appropriate manner  - Assess patient/family's coping skills and non-compliant behavior (including use of illegal substances)  - Utilize positive, consistent limit setting strategies supporting safety of patient, staff and others  - Initiate consult with Case Management, Spiritual Care or other ancillary services as appropriate  - If a patient's/visitor's behavior jeopardizes the safety of the patient, staff, or others, refer to organization procedure     - Notify Security of behavior or suspected illegal substances which indicate the need for search of the patient and/or belongings  - Encourage participation in the decision making process about a behavioral management agreement; implement if patient meets criteria  Outcome: Not Progressing

## 2019-11-02 NOTE — PROGRESS NOTES
Progress Note - [address] Le[address] Amado 68 y o  female MRN: [id number]   Unit/Bed#: [facility] 203-02 Encounter: [id number]    Behavior over the last 24 hours: episodes of agitation  Kat anxious and fearful this AM and agitated/aggressive with AM care  She refused all medications this morning and received PRN Zyprexa  Will discontinue Zyprexa due to history of EPS on previous antipsychotics  Will order Ativan 1 mg IM for severe anxiety or agitation  She is napping off an on most of day since PRN  Still appears anxious and fearful  Unable to participate in milieu due advanced dementia  Continues to require 1:1 due to wandering and poor safety awareness  Sleep: normal  Appetite: poor  Medication side effects: Still occasional leans to one side when ambulating   ROS: no complaints    Mental Status Evaluation:    Appearance:  marginal hygiene   Behavior:  guarded   Speech:  scant, aphasia   Mood:  anxious, irritable   Affect:  constricted   Thought Process:  disorganized   Associations: unable to assess - non-verbal   Thought Content:  some paranoia   Perceptual Disturbances: none   Risk Potential: Suicidal ideation - None  Homicidal ideation - unable to assess  Potential for aggression - Yes, due to poor impulse control   Sensorium:  oriented to person   Memory:  patient does not answer   Consciousness:  alert and awake   Attention: decreased concentration and decreased attention span   Insight:  nil   Judgment: poor   Gait/Station: slow gait   Motor Activity: no abnormal movements     Vital signs in last 24 hours:    Temp:  [97 8 °F (36 6 °C)-98 7 °F (37 1 °C)] 98 7 °F (37 1 °C)  HR:  [63-79] 63  Resp:  [16-18] 18  BP: (111-130)/(58-59) 111/59    Laboratory results: I have personally reviewed all pertinent laboratory/tests results      Suicide/Homicide Risk Assessment:    Risk of Harm to Self:   Current Specific Risk Factors include: current unstable mood, impaired cognition  Protective Factors: no current suicidal plan or intent  Based on today's assessment, Genoveva April presents the following risk of harm to self: low    Risk of Harm to Others:  Current Specific Risk Factors include: none  Protective Factors: no current homicidal ideation  Based on today's assessment, Genoveva April presents the following risk of harm to others: none    The following interventions are recommended: behavioral checks every 7 minutes, continued hospitalization on locked unit     Progress Toward Goals: slightly less agitated, still very anxious, still labile, does not participate in milieu therapy, does not participate in groups    Assessment/Plan   Principal Problem:    Major depressive disorder, recurrent, severe with psychotic features ([facility] [address] )  Active Problems:    Major neurocognitive disorder, due to multiple etiologies, with behavioral disturbance, severe ([facility] [address] )    Recommended Treatment:     Planned medication and treatment changes:     All current active medications have been reviewed  Encourage group therapy, milieu therapy and occupational therapy  Behavioral Health checks every 7 minutes  Continue current medications:    Current Facility-Administered Medications:  acetaminophen 975 mg Oral Q6H PRN Zygmunt Bilberry, CRNP   aluminum-magnesium hydroxide-simethicone 30 mL Oral Q4H PRN Zygmunt Bilberry, CRNP   amantadine 100 mg Oral HS Dariana Mojica MD   [START ON 12/25/2019] cholecalciferol 1,000 Units Oral Daily Dixie Cr MD   citalopram 10 mg Oral Daily Zygmunt Bilberry, CRNP   cyanocobalamin 1,000 mcg Intramuscular Q30 Days Dixie Cr MD   dextromethorphan-guaiFENesin 10 mL Oral Q4H PRN Tanya Huber PA-C   divalproex sodium 125 mg Oral TID Dariana Mojica MD   ergocalciferol 50,000 Units Oral Weekly Dixie Cr MD   hydrOXYzine HCL 25 mg Oral Q4H PRN Zygmunt Bilberry, CRNP   ibuprofen 200 mg Oral Q8H PRN Zygmunt Bilberry, CRNP   ibuprofen 400 mg Oral Q8H PRN Zygmunt Bilberry, CRNP   levothyroxine 88 mcg Oral Early Morning SINA Louise   LORazepam 1 mg Intramuscular Q4H PRN Pasquale Cyr, SINA   LORazepam 0 5 mg Oral Q6H PRN Pasquale Cyr, SINA   losartan 25 mg Oral BID Severa Prose Rockovits, PA-C   magnesium hydroxide 30 mL Oral Daily PRN Pasquale Cyr, SINA   memantine 10 mg Oral BID Winter Kenyon MD   OLANZapine 5 mg Intramuscular Q8H PRN Pasquale Cyr, SINA   OLANZapine 5 mg Oral Q8H PRN SINA Louise   omeprazole (PRILOSEC) suspension 2 mg/mL 20 mg Oral Daily Winter Kenyon MD   sodium chloride 1 spray Each Nare Q4H PRN Severa Prose Rockovits, PA-C   traZODone 25 mg Oral TID Lyndal MD Rory   traZODone 50 mg Oral HS PRN SINA Louise       Risks / Benefits of Treatment:    Risks, benefits, and possible side effects of medications explained to patient  Patient has limited understanding of risks and benefits of treatment at this time, but agrees to take medications as prescribed  Counseling / Coordination of Care:    Patient's progress reviewed with nursing staff  Medications, treatment progress and treatment plan reviewed with patient      SINA Luna 11/02/19

## 2019-11-02 NOTE — PROGRESS NOTES
Patient remained asleep until 0900  Awakened screaming, yelling and poorly redirected  Offered fluids; spitting on staff  Labile, crying, wailing with care  Severe anxiety/agitation level  Accompained into quiet area in chair with sitter in place  Offered meds crushed in vanilla pudding; spit entire administration onto table  Continued screaming; behaviorally unchanged; did take Zyprexa 5 mg  And Depakote crushed in small amount of ice cream  Refused further attempts at liquid or solid nourishment  Level of agitation reduced within a short period  Ambulating with staff assist in hallway  No significant leaning noted  Fatigued within 15-20 minutes  Assisted to mauricio chair without opposition  Maintained under 1:1 observation, moving chair throughout unit

## 2019-11-03 LAB
ALBUMIN SERPL BCP-MCNC: 4.2 G/DL (ref 3.5–5.7)
ALP SERPL-CCNC: 62 U/L (ref 55–165)
ALT SERPL W P-5'-P-CCNC: 12 U/L (ref 7–52)
ANION GAP SERPL CALCULATED.3IONS-SCNC: 9 MMOL/L (ref 4–13)
AST SERPL W P-5'-P-CCNC: 14 U/L (ref 13–39)
BASOPHILS # BLD AUTO: 0 THOUSANDS/ΜL (ref 0–0.1)
BASOPHILS NFR BLD AUTO: 1 % (ref 0–2)
BILIRUB SERPL-MCNC: 0.4 MG/DL (ref 0.2–1)
BUN SERPL-MCNC: 25 MG/DL (ref 7–25)
CALCIUM SERPL-MCNC: 10.3 MG/DL (ref 8.6–10.5)
CHLORIDE SERPL-SCNC: 104 MMOL/L (ref 98–107)
CO2 SERPL-SCNC: 28 MMOL/L (ref 21–31)
CREAT SERPL-MCNC: 0.81 MG/DL (ref 0.6–1.2)
EOSINOPHIL # BLD AUTO: 0.2 THOUSAND/ΜL (ref 0–0.61)
EOSINOPHIL NFR BLD AUTO: 2 % (ref 0–5)
ERYTHROCYTE [DISTWIDTH] IN BLOOD BY AUTOMATED COUNT: 13.1 % (ref 11.5–14.5)
GFR SERPL CREATININE-BSD FRML MDRD: 73 ML/MIN/1.73SQ M
GLUCOSE SERPL-MCNC: 116 MG/DL (ref 65–99)
HCT VFR BLD AUTO: 41.1 % (ref 42–47)
HGB BLD-MCNC: 13.2 G/DL (ref 12–16)
LYMPHOCYTES # BLD AUTO: 1.2 THOUSANDS/ΜL (ref 0.6–4.47)
LYMPHOCYTES NFR BLD AUTO: 16 % (ref 21–51)
MCH RBC QN AUTO: 31.8 PG (ref 26–34)
MCHC RBC AUTO-ENTMCNC: 32.2 G/DL (ref 31–37)
MCV RBC AUTO: 99 FL (ref 81–99)
MONOCYTES # BLD AUTO: 0.4 THOUSAND/ΜL (ref 0.17–1.22)
MONOCYTES NFR BLD AUTO: 6 % (ref 2–12)
NEUTROPHILS # BLD AUTO: 5.6 THOUSANDS/ΜL (ref 1.4–6.5)
NEUTS SEG NFR BLD AUTO: 75 % (ref 42–75)
PLATELET # BLD AUTO: 147 THOUSANDS/UL (ref 149–390)
PMV BLD AUTO: 9 FL (ref 8.6–11.7)
POTASSIUM SERPL-SCNC: 3.7 MMOL/L (ref 3.5–5.5)
PROT SERPL-MCNC: 6.5 G/DL (ref 6.4–8.9)
RBC # BLD AUTO: 4.16 MILLION/UL (ref 3.9–5.2)
SODIUM SERPL-SCNC: 141 MMOL/L (ref 134–143)
WBC # BLD AUTO: 7.4 THOUSAND/UL (ref 4.8–10.8)

## 2019-11-03 PROCEDURE — 80053 COMPREHEN METABOLIC PANEL: CPT | Performed by: NURSE PRACTITIONER

## 2019-11-03 PROCEDURE — 99231 SBSQ HOSP IP/OBS SF/LOW 25: CPT | Performed by: NURSE PRACTITIONER

## 2019-11-03 PROCEDURE — 85025 COMPLETE CBC W/AUTO DIFF WBC: CPT | Performed by: NURSE PRACTITIONER

## 2019-11-03 RX ADMIN — MEMANTINE 10 MG: 5 TABLET ORAL at 08:59

## 2019-11-03 RX ADMIN — LEVOTHYROXINE SODIUM 88 MCG: 88 TABLET ORAL at 05:53

## 2019-11-03 RX ADMIN — DIVALPROEX SODIUM 125 MG: 125 CAPSULE, COATED PELLETS ORAL at 08:23

## 2019-11-03 RX ADMIN — Medication 0.5 MG: at 13:39

## 2019-11-03 RX ADMIN — TRAZODONE HYDROCHLORIDE 25 MG: 50 TABLET ORAL at 17:00

## 2019-11-03 RX ADMIN — LORAZEPAM 0.5 MG: 0.5 TABLET ORAL at 08:15

## 2019-11-03 RX ADMIN — DIVALPROEX SODIUM 125 MG: 125 CAPSULE, COATED PELLETS ORAL at 17:00

## 2019-11-03 RX ADMIN — CITALOPRAM HYDROBROMIDE 10 MG: 10 TABLET ORAL at 08:55

## 2019-11-03 RX ADMIN — DIVALPROEX SODIUM 125 MG: 125 CAPSULE, COATED PELLETS ORAL at 20:44

## 2019-11-03 RX ADMIN — TRAZODONE HYDROCHLORIDE 25 MG: 50 TABLET ORAL at 20:44

## 2019-11-03 RX ADMIN — Medication 20 MG: at 11:11

## 2019-11-03 RX ADMIN — LOSARTAN POTASSIUM 25 MG: 50 TABLET, FILM COATED ORAL at 17:00

## 2019-11-03 RX ADMIN — AMANTADINE 100 MG: 100 CAPSULE ORAL at 20:43

## 2019-11-03 RX ADMIN — LORAZEPAM 0.5 MG: 0.5 TABLET ORAL at 20:43

## 2019-11-03 RX ADMIN — MEMANTINE 10 MG: 5 TABLET ORAL at 17:18

## 2019-11-03 RX ADMIN — LOSARTAN POTASSIUM 25 MG: 50 TABLET, FILM COATED ORAL at 08:56

## 2019-11-03 RX ADMIN — TRAZODONE HYDROCHLORIDE 25 MG: 50 TABLET ORAL at 09:00

## 2019-11-03 NOTE — PLAN OF CARE
Problem: ANXIETY  Goal: Will report anxiety at manageable levels  Description  INTERVENTIONS:  - Administer medication as ordered  - Teach and encourage coping skills  - Provide emotional support  - Assess patient/family for anxiety and ability to cope  Outcome: Not Progressing

## 2019-11-03 NOTE — PROGRESS NOTES
Patient has been sleeping well through the night  No changes or problems  Bed alarm on along with Q 7 minute safety checks

## 2019-11-03 NOTE — PROGRESS NOTES
Pt observed to be sitting out in the community with her patient observer all day and evening  Her affect was labile, mood labile  She had two episodes of restless anxiety where she suddenly stood up and began to wander around the unit  She required continuous prompting and redirection by staff to avoid going into other patient rooms or attempting to exit  During these episodes, Pt was tearful and spoke random words which did not make any sense  Prior ativan PO PRN was effective at relieving her anxiety, as was snack and her HS medications  Pt did not have any agitation  Pt cooperative with staff care  Pt maintained on 1:1 continuous observation while awake due to poor safety awareness with high risk of falls

## 2019-11-03 NOTE — PROGRESS NOTES
Progress Note - [address] Amado 68 y o  female MRN: [id number]   Unit/Bed#: [facility] 203-02 Encounter: [id number]    Behavior over the last 24 hours: urmila Esquivel was quieter and calmer yesterday afternoon into evening, did have prn ativan for restlessness and was effective  She slept through the night  Woke up frightened and crying, she becomes quite distraught with care and remains upset and crying for extended period of time after  Ambulation has become more rapid and with poor safety awareness  Continues on 1:1  Does not interact with peers in milieu  Does not come to groups  Unable to participate due to advanced dementia  Sleep: normal  Appetite: variable  Medication side effects: No   ROS: no complaints    Mental Status Evaluation:    Appearance:  apprehensive   Behavior:  uncooperative, restless, some agitation   Speech:  slow, aphasia   Mood:  anxious   Affect:  flat   Thought Process:  disorganized   Associations: unable to assess - non-verbal   Thought Content:  paranoid ideation   Perceptual Disturbances: none   Risk Potential: Suicidal ideation - None  Homicidal ideation - None  Potential for aggression - No   Sensorium:  non-verbal   Memory:  recent and remote memory: unable to assess due to lack of cooperation   Consciousness:  alert and awake   Attention: poor concentration and poor attention span   Insight:  limited   Judgment: limited   Gait/Station: unstable gait   Motor Activity: no abnormal movements     Vital signs in last 24 hours:    Temp:  [97 8 °F (36 6 °C)-99 9 °F (37 7 °C)] 97 8 °F (36 6 °C)  HR:  [63-84] 72  Resp:  [16-18] 18  BP: (111-146)/(59-71) 140/67    Laboratory results: I have personally reviewed all pertinent laboratory/tests results      Suicide/Homicide Risk Assessment:    Risk of Harm to Self:   Current Specific Risk Factors include: diagnosis of mood disorder  Protective Factors: no current suicidal plan or intent  Based on today's assessment, Rasheeda Morocho presents the following risk of harm to self: none    Risk of Harm to Others:  Current Specific Risk Factors include: none  Protective Factors: no current homicidal ideation  Based on today's assessment, Rasheeda Morocho presents the following risk of harm to others: none    The following interventions are recommended: continued hospitalization on locked unit, continual observation visual     Progress Toward Goals: regressed    Assessment/Plan   Principal Problem:    Major depressive disorder, recurrent, severe with psychotic features ([facility] [address] )  Active Problems:    Major neurocognitive disorder, due to multiple etiologies, with behavioral disturbance, severe ([facility] [address] )    Recommended Treatment:     Planned medication and treatment changes:   Will check labs and urinalysis for changes in behavior    All current active medications have been reviewed  Encourage group therapy, milieu therapy and occupational therapy  Behavioral Health checks every 7 minutes  Add PRN ativan gel for anxiety  Continue current medications:    Current Facility-Administered Medications:  acetaminophen 975 mg Oral Q6H PRN Yu Bridegroom, CRNP   aluminum-magnesium hydroxide-simethicone 30 mL Oral Q4H PRN Yu Bridegroom, CRNP   amantadine 100 mg Oral HS Eun Dominguez MD   [START ON 12/25/2019] cholecalciferol 1,000 Units Oral Daily Ruth Do MD   citalopram 10 mg Oral Daily Yu Bridegroom, CRNP   cyanocobalamin 1,000 mcg Intramuscular Q30 Days Ruth Do MD   dextromethorphan-guaiFENesin 10 mL Oral Q4H PRN Lino Huber PA-C   divalproex sodium 125 mg Oral TID Eun Dominguez MD   ergocalciferol 50,000 Units Oral Weekly Ruth Do MD   ibuprofen 200 mg Oral Q8H PRN Yu Bridegroom, CRNP   ibuprofen 400 mg Oral Q8H PRN Yu Bridegroom, CRNP   levothyroxine 88 mcg Oral Early Morning Yu Bridegroom, CRNP   LORazepam 1 mg Intramuscular Q4H PRN Sherel Oddi, CRNP   LORazepam 0 5 mg Oral Q6H PRN Sherel Oddi, CRNP losartan 25 mg Oral BID Elyce Prader Rockovits, PA-C   magnesium hydroxide 30 mL Oral Daily PRN SINA Larsen   memantine 10 mg Oral BID Alfredo Juan MD   omeprazole (PRILOSEC) suspension 2 mg/mL 20 mg Oral Daily Alfredo Juan MD   sodium chloride 1 spray Each Nare Q4H PRN Elyce Prader Rockovits, PA-C   traZODone 25 mg Oral TID Mariela Cunningham MD   traZODone 50 mg Oral HS PRN SINA Larsen       Risks / Benefits of Treatment:    Risks, benefits, and possible side effects of medications explained to patient  Patient has limited understanding of risks and benefits of treatment at this time, but agrees to take medications as prescribed  Counseling / Coordination of Care:    Patient's progress reviewed with nursing staff  Medications, treatment progress and treatment plan reviewed with patient      Koreen Cockayne, CRNP 11/03/19

## 2019-11-03 NOTE — PROGRESS NOTES
Wilton Amado#  IXL:[birth year] F  MARY:[id number]    OPC:[id number]  Adm Date: 10/22/2019 4874  9:26 AM   ATT PHY: Richard Abel, [address] [facility] [facility] [address]         Subjective     The patient was seen after reviewing the chart and discussing the case with caring staff  Patient examined at bedside  It was needed noticed that patient was screaming in the hallway seemingly anxious  As per the caring staff patient wants her cell to be move from 1 place to the other on her [place] chair  It was also reported that patient was having foul Odor urine with small amount of urine  Objective     Vitals:    11/03/19 1546   BP:    Pulse:    Resp:    Temp: 98 6 °F (37 °C)   SpO2:        General Appearance: Awake and Alert  No acute distress  HEENT: Normocephalic, atraumatic  PERRLA, EOMI, MMM  Heart: RRR, no murmurs  Normal S1 and S2   Lungs: CTA bilaterally with fair air entry  Assessment     Laura Amado is a(n) 68y o  year old female with dementia with behavioral disturbance     1  Alzheimer's dementia with behavioral disturbance   Patient's dementia seems to be advanced   Patient's PET CT of the brain on 10/15/2015 showed suspicion for Lewy body disease  Some of patient's symptoms including visual hallucinations and mild cogwheel rigidity a sign of parkinsonism supports that diagnosis  Patient is on low dose amantadine  There was also a suspicion for Frontotemporal Dementia with cerebral cortical atrophy   Currently patient is on Namenda 10 mg b i d  Patient is otherwise doing well with PT   2  Hypothyroidism   Levothyroxine  3  Cardiac with history of hypertension   Losartan 25mg BID  4  GERD    Protonix  5  Anorexia/weight loss   Patient has lost 5 lb over the past 3 weeks   I have consulted Nutrition for evaluation and treatment  Continue Ensure nutritional supplements after the meals  6  Gait abnormality   PT/OT  7   DJD/osteoarthritis   Patient may get Tylenol on as needed basis  8  Vitamin-D deficiency  Vitamin-D bolus doses for 11 more weeks followed by vitamin D3 1000 units daily  9  Vitamin B12 deficiency  Monthly B12 injections  10  Smaller amount of urine on urination along with fall Odor  I will get urinalysis and culture and sensitivity to rule out UTI

## 2019-11-04 LAB
BACTERIA UR QL AUTO: ABNORMAL /HPF
BILIRUB UR QL STRIP: NEGATIVE
CLARITY UR: ABNORMAL
COLOR UR: YELLOW
GLUCOSE UR STRIP-MCNC: NEGATIVE MG/DL
HGB UR QL STRIP.AUTO: NEGATIVE
KETONES UR STRIP-MCNC: ABNORMAL MG/DL
LEUKOCYTE ESTERASE UR QL STRIP: NEGATIVE
NITRITE UR QL STRIP: NEGATIVE
NON-SQ EPI CELLS URNS QL MICRO: ABNORMAL /HPF
PH UR STRIP.AUTO: 7.5 [PH]
PROT UR STRIP-MCNC: NEGATIVE MG/DL
RBC #/AREA URNS AUTO: ABNORMAL /HPF
SP GR UR STRIP.AUTO: 1.01 (ref 1–1.03)
UROBILINOGEN UR QL STRIP.AUTO: 0.2 E.U./DL
WBC #/AREA URNS AUTO: ABNORMAL /HPF

## 2019-11-04 PROCEDURE — 81001 URINALYSIS AUTO W/SCOPE: CPT | Performed by: FAMILY MEDICINE

## 2019-11-04 PROCEDURE — 87086 URINE CULTURE/COLONY COUNT: CPT | Performed by: FAMILY MEDICINE

## 2019-11-04 PROCEDURE — 99232 SBSQ HOSP IP/OBS MODERATE 35: CPT | Performed by: PSYCHIATRY & NEUROLOGY

## 2019-11-04 RX ORDER — VENLAFAXINE HYDROCHLORIDE 37.5 MG/1
37.5 CAPSULE, EXTENDED RELEASE ORAL DAILY
Status: DISCONTINUED | OUTPATIENT
Start: 2019-11-05 | End: 2019-11-06

## 2019-11-04 RX ORDER — AMANTADINE HYDROCHLORIDE 50 MG/5ML
50 SOLUTION ORAL EVERY MORNING
Status: DISCONTINUED | OUTPATIENT
Start: 2019-11-05 | End: 2019-11-13 | Stop reason: HOSPADM

## 2019-11-04 RX ADMIN — MEMANTINE 10 MG: 5 TABLET ORAL at 17:08

## 2019-11-04 RX ADMIN — TRAZODONE HYDROCHLORIDE 25 MG: 50 TABLET ORAL at 20:26

## 2019-11-04 RX ADMIN — AMANTADINE 100 MG: 100 CAPSULE ORAL at 20:26

## 2019-11-04 RX ADMIN — TRAZODONE HYDROCHLORIDE 25 MG: 50 TABLET ORAL at 17:08

## 2019-11-04 RX ADMIN — LOSARTAN POTASSIUM 25 MG: 50 TABLET, FILM COATED ORAL at 08:52

## 2019-11-04 RX ADMIN — LOSARTAN POTASSIUM 25 MG: 50 TABLET, FILM COATED ORAL at 17:08

## 2019-11-04 RX ADMIN — LORAZEPAM 0.5 MG: 0.5 TABLET ORAL at 02:43

## 2019-11-04 RX ADMIN — DIVALPROEX SODIUM 125 MG: 125 CAPSULE, COATED PELLETS ORAL at 08:52

## 2019-11-04 RX ADMIN — LEVOTHYROXINE SODIUM 88 MCG: 88 TABLET ORAL at 06:43

## 2019-11-04 RX ADMIN — Medication 20 MG: at 08:53

## 2019-11-04 RX ADMIN — CITALOPRAM HYDROBROMIDE 10 MG: 10 TABLET ORAL at 08:53

## 2019-11-04 RX ADMIN — TRAZODONE HYDROCHLORIDE 25 MG: 50 TABLET ORAL at 08:52

## 2019-11-04 RX ADMIN — MEMANTINE 10 MG: 5 TABLET ORAL at 08:52

## 2019-11-04 RX ADMIN — LORAZEPAM 0.5 MG: 0.5 TABLET ORAL at 19:10

## 2019-11-04 NOTE — PROGRESS NOTES
Administered PRN Ativan as per order for anxiety and agitation  Patient is becoming more restless, continues to be tearful/ crying and is now spitting  Butts Anxiety rating performed with a result of 21 and Agitated Scale rating of 31  She continues to sit by the nurses station in a recliner for patient safety  1:1 observer with patient  Will CTM

## 2019-11-04 NOTE — PROGRESS NOTES
Pt reassessed after 20 minutes and found to be sitting calmly with her patient observer  She was cooperative with lab draws and HS care  Pt maintained on 1:1 observation for safety

## 2019-11-04 NOTE — PROGRESS NOTES
Patient began yelling and making attempt to get OOB without assistance  Staff attempted to reposition patient for comfort then offered toileting; urine specimen obtained as per order and transported to the lab  Patient continued yelling  She was assisted to a recliner chair and placed by the nurses station for closer monitoring  Patient is minimally restless but has calmed; no longer yelling  Will CTM  1:1 observation back in effect as patient is awake

## 2019-11-04 NOTE — PLAN OF CARE
Problem: Ineffective Coping  Goal: Participates in unit activities  Description  Interventions:  - Provide therapeutic environment   - Provide required programming   - Redirect inappropriate behaviors   Outcome: Not Progressing  Patient attended one group and has not been visible on unit

## 2019-11-04 NOTE — PROGRESS NOTES
Patient is restless and tearful at this time  Reassurance provided to patient  Attempts to verbally redirect are minimally effective  Will CTM

## 2019-11-04 NOTE — PROGRESS NOTES
The patient heard yelling loudly and crying at 0735 with am shower and assistance provided by clinical staff  Support, reassurance and redirection provided by clinical staff minimally effective  The patient seemingly more calm following shower and noted to be sitting quietly and calmly in recliner chair in milieu  The patient noted to intermittently smile at RN at time of initial conversation, the patient compliant with medications and meals, appetite fair  Clinical staff attempted to ambulate patient, but patient's gait noted to be unsteady and patient noted to heavily lean to the right  No verbal or non-verbal complaints of pain noted, Flacc score 0  The patient noted to be falling asleep while sitting in recliner at 1100, patient assisted back to bed by clinical staff without difficulty  The patient noted to be sleeping quietly and without difficulty at this time  Continual visual observation continues while patient awake to monitor for behaviors, safety, confusion, poor insight and safety awareness  Will continue to monitor

## 2019-11-04 NOTE — PROGRESS NOTES
The patient noted to awaken at [phone] and assisted into recliner chair due to gait instability  Clinical staff offered the patient food and fluids, the patient ate part of a  and refused to eat or drink any other nutrition items offered  The patient affect noted to be labile, intermittently crying, anxious and nervous  Verbal redirection and therapeutic mobility provided by clinical staff noted to be moderately effective  Continual visual observation continues to monitor for behaviors, safety, high fall risk, poor safety awareness and insight  Will continue to monitor

## 2019-11-04 NOTE — PROGRESS NOTES
Pt medicated with ativan 0 5MG PO PRN for restless anxiety, Butts Anxiety Scale Score of 29  She was hyperventilating, tearful, yelling and flailing her hands  She believed that something was crawling on her, could not be consoled for more than a few seconds at time  Will monitor closely for intervention response  Pt maintained on 1:1 continuous observation for safety

## 2019-11-04 NOTE — PROGRESS NOTES
Wilton Amado#  HVO:[birth year] F  UJQ:[id number]    VOD:[id number]  Adm Date: 10/22/2019 9780  9:26 AM   ATT PHY: Guido Toro, [address]         Subjective     The patient was seen after reviewing the chart and discussing the case with caring staff  Today during our encounter, the patient was sleeping  No new issues were noted by caring staff  UA was unremarkable for evidence of UTI  Objective     Vitals:    11/04/19 0713   BP: 156/76   Pulse: 69   Resp: 16   Temp: 98 1 °F (36 7 °C)   SpO2: 98%       General Appearance: Sleeping in no acute distress  HEENT: Normocephalic, atraumatic  PERRLA, EOMI, MMM  Heart: RRR, no murmurs  Normal S1 and S2   Lungs: CTA bilaterally with fair air entry  Assessment     Mikey Amado is a(n) 68y o  year old female with dementia with behavioral disturbance     1  Alzheimer's dementia with behavioral disturbance   Patient's dementia seems to be advanced   Patient's PET CT of the brain on 10/15/2015 showed suspicion for Lewy body disease  Some of patient's symptoms including visual hallucinations and mild cogwheel rigidity a sign of parkinsonism supports that diagnosis  Patient is on low dose amantadine  There was also a suspicion for Frontotemporal Dementia with cerebral cortical atrophy   Currently patient is on Namenda 10 mg b i d  Patient is otherwise doing well with PT   2  Hypothyroidism   Levothyroxine  3  Cardiac with history of hypertension   Losartan 25mg BID  4  GERD    Protonix  5  Anorexia/weight loss   Patient has lost 5 lb over the past 3 weeks   I have consulted Nutrition for evaluation and treatment  Continue Ensure nutritional supplements after the meals  6  Gait abnormality   PT/OT  7  DJD/osteoarthritis   Patient may get Tylenol on as needed basis  8  Vitamin-D deficiency  Vitamin-D bolus doses for 10 more weeks followed by vitamin D3 1000 units daily  9  Vitamin B12 deficiency  Monthly B12 injections  The patient was discussed with Dr Elise Smyth and he is in agreement with the above note

## 2019-11-04 NOTE — PROGRESS NOTES
Patient is sleeping at this time  PRN Ativan appears to have been effective as patient was able to stop crying and yelling  Staff played some soft, classical music in the background to calm/ distract patient as well  She remains in a recliner by the nurses station for closer observation  Yellow tread socks on b/l feet

## 2019-11-04 NOTE — PROGRESS NOTES
Assumed care of this patient from prior shift RN at this time  Patient is currently in her bed, sleeping  She appears to be comfortable with no obvious signs of distress  Bed is in the lowest position with side rails up x3  Bed alarm in place for patient safety  Will CTM via q7 minute safety checks while asleep

## 2019-11-04 NOTE — PROGRESS NOTES
11/04/19 1004   Team Meeting   Meeting Type Daily Rounds   Team Members Present   Team Members Present Physician;Nurse;;   Physician Team Member Dr Amol Stiles MD; Naga Otto[address]    Nursing Team Member Maris Naidu RN    Care Management Team Member Alda Pickering, MARIOLA    Social Work Team Member SIDDHARTH Stroud    Patient/Family Present   Patient Present No   Patient's Family Present No     High acuity; 1:1 - safety, confusion; poor sleep last night; over weekend - screaming, crying, fearful of care; several prns utilized over weekend; [facility] - something crawling on her; tried to get out bed x 3; loud, tearful screaming in chair at nurses station; currently pleasant, calm, verbal

## 2019-11-04 NOTE — PROGRESS NOTES
Progress Note - 275 Leopoldo Amado 68 y o  female MRN: [id number]   Unit/Bed#: Stephan Nuñez 203-02 Encounter: [id number]    Behavior over the last 24 hours: episodes of agitation  [facility] was seen today, appears mild somnolence but able to sit in chair for her meals  Staff reports PT was more agitated over the weekend, yelling and being combative periodically  She needed PRN meds to calm her down  Staff reports her behavior is better this morning  No acute change of mental status was noted on exam      Sleep: slept off and on  Appetite: increased  Medication side effects: none reported by staff  ROS: no complaints, no EPS/rigidity noted at this time  Mental Status Evaluation:    Appearance:  age appropriate   Behavior:  easily redirectable   Speech:  incoherent   Mood:  anxious   Affect:  constricted   Thought Process:  poverty of thought   Associations: incoherent   Thought Content:  poverty of thought   Perceptual Disturbances: None    Risk Potential: Suicidal ideation - None  Homicidal ideation - None   Sensorium:  oriented to person   Memory:  impaired   Consciousness:  alert and awake   Attention: poor concentration and poor attention span   Insight:  impaired   Judgment: impaired   Gait/Station: unstable gait   Motor Activity: no abnormal movements     Vital signs in last 24 hours:    Temp:  [98 1 °F (36 7 °C)-99 °F (37 2 °C)] 98 1 °F (36 7 °C)  HR:  [68-93] 69  Resp:  [16-22] 16  BP: (132-156)/(76-95) 156/76    Laboratory results: I have personally reviewed all pertinent laboratory/tests results  Assessment/Plan   Principal Problem:    Major depressive disorder, recurrent, severe with psychotic features ([facility] [address] )  Active Problems:    Major neurocognitive disorder, due to multiple etiologies, with behavioral disturbance, severe ([address] )  Will d/c Depakote and Ct with Ativan prn  Will d/c Celexa and begin Effexor XR 37 5 mg daily      Recommended Treatment:     Planned medication and treatment changes: All current active medications have been reviewed  Encourage group therapy, milieu therapy and occupational therapy  Behavioral Health checks every 7 minutes    Current Facility-Administered Medications:  acetaminophen 975 mg Oral Q6H PRN Adline Para, CRNP   aluminum-magnesium hydroxide-simethicone 30 mL Oral Q4H PRN Adline Para, CRNP   amantadine 100 mg Oral HS Wali Bolanos MD   [START ON 11/5/2019] amantadine 50 mg Oral QAM Wali Bolanos MD   [START ON 12/25/2019] cholecalciferol 1,000 Units Oral Daily Moody Pete MD   citalopram 10 mg Oral Daily Adline Para, CRNP   cyanocobalamin 1,000 mcg Intramuscular Q30 Days Moody Pete MD   dextromethorphan-guaiFENesin 10 mL Oral Q4H PRN Azra Huber PA-C   ergocalciferol 50,000 Units Oral Weekly Moody Pete MD   ibuprofen 200 mg Oral Q8H PRN Adline Para, CRNP   ibuprofen 400 mg Oral Q8H PRN Adline Para, CRNP   levothyroxine 88 mcg Oral Early Morning Adline Para, CRNP   lorazepam 0 5 mg Transdermal Q6H PRN Angie Shackleton, CRNP   LORazepam 1 mg Intramuscular Q4H PRN Angie Shackteodoroon, CRNP   LORazepam 0 5 mg Oral Q6H PRN Angie Shackleton, CRNP   losartan 25 mg Oral BID Azra Huber PA-C   magnesium hydroxide 30 mL Oral Daily PRN Adline Para, CRNP   memantine 10 mg Oral BID Moody Pete MD   omeprazole (PRILOSEC) suspension 2 mg/mL 20 mg Oral Daily Moody Pete MD   sodium chloride 1 spray Each Nare Q4H PRN Azra Huber PA-C   traZODone 25 mg Oral TID Wali Bolanos MD   traZODone 50 mg Oral HS PRN Adline Para, CRNP       Risks / Benefits of Treatment:    Pt is unable to understand due to her advanced NCD    Counseling / Coordination of Care:    Patient's progress discussed with staff in treatment team meeting  Medications, treatment progress and treatment plan reviewed with patient      Ne Antunez MD 11/04/19

## 2019-11-05 LAB — BACTERIA UR CULT: NORMAL

## 2019-11-05 PROCEDURE — 99232 SBSQ HOSP IP/OBS MODERATE 35: CPT | Performed by: PSYCHIATRY & NEUROLOGY

## 2019-11-05 RX ADMIN — LOSARTAN POTASSIUM 25 MG: 50 TABLET, FILM COATED ORAL at 09:48

## 2019-11-05 RX ADMIN — TRAZODONE HYDROCHLORIDE 25 MG: 50 TABLET ORAL at 20:35

## 2019-11-05 RX ADMIN — MEMANTINE 10 MG: 5 TABLET ORAL at 17:29

## 2019-11-05 RX ADMIN — MEMANTINE 10 MG: 5 TABLET ORAL at 09:47

## 2019-11-05 RX ADMIN — LOSARTAN POTASSIUM 25 MG: 50 TABLET, FILM COATED ORAL at 17:29

## 2019-11-05 RX ADMIN — AMANTADINE HYDROCHLORIDE 50 MG: 50 SOLUTION ORAL at 09:45

## 2019-11-05 RX ADMIN — LEVOTHYROXINE SODIUM 88 MCG: 88 TABLET ORAL at 06:25

## 2019-11-05 RX ADMIN — Medication 20 MG: at 09:46

## 2019-11-05 RX ADMIN — VENLAFAXINE HYDROCHLORIDE 37.5 MG: 37.5 CAPSULE, EXTENDED RELEASE ORAL at 09:48

## 2019-11-05 RX ADMIN — AMANTADINE 100 MG: 100 CAPSULE ORAL at 20:35

## 2019-11-05 RX ADMIN — TRAZODONE HYDROCHLORIDE 25 MG: 50 TABLET ORAL at 17:29

## 2019-11-05 RX ADMIN — TRAZODONE HYDROCHLORIDE 25 MG: 50 TABLET ORAL at 09:47

## 2019-11-05 NOTE — PROGRESS NOTES
Documentation for [id number] shift  The patient noted to be sleeping quietly and without difficulty at time of intial assessment  The patient awakened for am care, noted to become increasingly anxious, screaming and crying with care provided by clinical staff  Screaming and crying subsided after care provided, but the patient's affect anxious, intermittently agitated and seemingly fearful  The patient's gait noted to be unsteady, the patient's pace of gait noted to be fast and patient noted to lean heavily to right side  The patient noted to have poor safety awareness and insight  RN notified by continual observer that the patient stopped while ambulating and sat on the floor  Verbal cues and direction provided by clinical staff ineffective  The patient assisted to recliner chair for periods of rest throughout the shift  The patient noted to have periods of mood lability throughout the shift  The patient noted to have clear nasal drainage and intermittent cough productive for light tan sputum  The patient's lungs noted to be clear, but diminished upon ausculation  RN notified George Cosby pa-c of the patient's noted assessment and productive cough  The patient compliant with meals and medications, appetite noted to be fair  Continual visual observation maintained while patient awake to monitor for behaviors, safety, high fall risk due to gait instability, poor insight and safety awareness and confusion  Will continue to monitor

## 2019-11-05 NOTE — PROGRESS NOTES
Assumed care of this patient from prior shift RN at this time  Patient is in bed sleeping; appears to be comfortable lying on her right side  Will CTM via q7 minute safety checks while patient is sleeping  Bed remains in lowest position with side rails up x3  Bed alarm in place for patient safety

## 2019-11-05 NOTE — PROGRESS NOTES
[id number]  Patient is confused and oriented to self  She has been unsteady and required two staff members to help her ambulate  Later in the evening, patient began screaming and crying and could not be consoled  Was given PRN PO Ativan at 1910 and it seemed to be effective as patient was calm and cooperative throughout the rest of the evening  Was compliant with all medications  Will continue monitoring

## 2019-11-05 NOTE — PROGRESS NOTES
11/05/19 0957   Team Meeting   Meeting Type Daily Rounds   Team Members Present   Team Members Present Physician;Nurse;;   Physician Team Member Dr Ne Antunez MD; Angie Ortiz[address]    Nursing Team Member Nicolasa Cheadle, RN    Care Management Team Member Shruti Sandoval RN    Social Work Team Member SIDDHARTH Laguna    Patient/Family Present   Patient Present No   Patient's Family Present No     Slept; much better night; evening hours - screaming - prn utilized; new meds start today; family visit today at 12pm; no discharge plan at this time

## 2019-11-05 NOTE — CASE MANAGEMENT
CM attempted to meet with patient for status update  Patient remains significantly confused, disoriented, disorganized  Distractible, easily irritated with very limited ability to attend or focus  Ambulating with assist for safety due to rapid, unsteady gait  Remains under 1:1 supervision for safety

## 2019-11-05 NOTE — PROGRESS NOTES
Patient remains in bed sleeping at this time  She was able to sleep through the night without issue  No acute behaviors during the night  Will CTM via q7 minute safety checks

## 2019-11-05 NOTE — PLAN OF CARE
Problem: Ineffective Coping  Goal: Participates in unit activities  Description  Interventions:  - Provide therapeutic environment   - Provide required programming   - Redirect inappropriate behaviors   Outcome: Not Progressing  Patient not able to tolerate groups at this time

## 2019-11-05 NOTE — PROGRESS NOTES
Wilton Amado#  QKB:8/7/1831 F  WXU:[id number]    DPE:[id number]  Adm Date: 10/22/2019 7421  9:26 AM   ATT PHY: Lexis Santiago, [facility]     The patient was seen after reviewing the chart and discussing the case with caring staff  Today during our encounter, the patient reported no acute concerns  She was largely non-verbal  No new issues were noted by caring staff  Objective     Vitals:    11/05/19 0700   BP: 145/70   Pulse: 74   Resp: 18   Temp: 98 4 °F (36 9 °C)   SpO2: 94%       General Appearance: Sleeping in no acute distress  HEENT: Normocephalic, atraumatic  PERRLA, EOMI, MMM  Heart: RRR, no murmurs  Normal S1 and S2   Lungs: CTA bilaterally with fair air entry  Assessment     Jhon Amado is a(n) 68y o  year old female with dementia with behavioral disturbance     1  Alzheimer's dementia with behavioral disturbance   Patient's dementia seems to be advanced   Patient's PET CT of the brain on 10/15/2015 showed suspicion for Lewy body disease  Some of patient's symptoms including visual hallucinations and mild cogwheel rigidity a sign of parkinsonism supports that diagnosis  Patient is on low dose amantadine  There was also a suspicion for Frontotemporal Dementia with cerebral cortical atrophy   Currently patient is on Namenda 10 mg b i d  Patient is otherwise doing well with PT   2  Hypothyroidism   Levothyroxine  3  Cardiac with history of hypertension   Losartan 25mg BID  4  GERD    Protonix  5  Anorexia/weight loss   Patient has lost 5 lb over the past 3 weeks   I have consulted Nutrition for evaluation and treatment  Continue Ensure nutritional supplements after the meals  6  Gait abnormality   PT/OT  7  DJD/osteoarthritis   Patient may get Tylenol on as needed basis  8  Vitamin-D deficiency  Vitamin-D bolus doses for 10 more weeks followed by vitamin D3 1000 units daily  9  Vitamin B12 deficiency  Monthly B12 injections  The patient was discussed with Dr Chance Simons and he is in agreement with the above note

## 2019-11-05 NOTE — PROGRESS NOTES
Progress Note - 275 Leopoldo Amado 68 y o  female MRN: [id number]   Unit/Bed#: Edwar Bingham 203-02 Encounter: [id number]    Behavior over the last 24 hours: slowly improving  Alberta Weber was seen today, appears less anxious and scared  She continues yelling at times but she is redirectable  Pt was noted to lean more to her right side today when she tried to walk with   She is able to verbalized short sentences but remains mostly incoherent due to her advanced NCD  Staff report minimal participation in groups and on the unit  Sleep: slept off and on  Appetite: fair  Medication side effects: unable to verbalize, none reported by staff  ROS: does not answer  No acute focal neurological or change of mental status noted  Mental Status Evaluation:    Appearance:  age appropriate   Behavior:  slightly more redirectable   Speech:  scant, incoherent   Mood:  anxious   Affect:  labile   Thought Process:  remains disorganized   Associations: loose associations   Thought Content:  poverty of thought   Perceptual Disturbances: visual hallucinations still present, but less frequent   Risk Potential: Suicidal ideation - None  Homicidal ideation - None   Sensorium:  oriented to person   Memory:  patient does not answer   Consciousness:  alert and awake   Attention: poor concentration and poor attention span   Insight:  impaired   Judgment: impaired   Gait/Station: unstable gait   Motor Activity: no abnormal movements     Vital signs in last 24 hours:    Temp:  [98 3 °F (36 8 °C)-98 8 °F (37 1 °C)] 98 4 °F (36 9 °C)  HR:  [66-83] 74  Resp:  [18-20] 18  BP: (145-176)/(70-84) 145/70    Laboratory results: I have personally reviewed all pertinent laboratory/tests results          Assessment/Plan   Principal Problem:    Major depressive disorder, recurrent, severe with [facility]  Active Problems:    Major neurocognitive disorder, due to multiple etiologies, with behavioral disturbance, severe ([address] )    Recommended Treatment:     Planned medication and treatment changes: All current active medications have been reviewed  Encourage group therapy, milieu therapy and occupational therapy  Behavioral Health checks every 7 minutes    Current Facility-Administered Medications:  acetaminophen 975 mg Oral Q6H PRN Adline Para, CRNP   aluminum-magnesium hydroxide-simethicone 30 mL Oral Q4H PRN Adline Para, CRNP   amantadine 100 mg Oral HS Wali Bolanos MD   amantadine 50 mg Oral QAM Wali Bolanos MD   [START ON 12/25/2019] cholecalciferol 1,000 Units Oral Daily Moody Pete MD   cyanocobalamin 1,000 mcg Intramuscular Q30 Days Moody Pete MD   dextromethorphan-guaiFENesin 10 mL Oral Q4H PRN Azra Huber PA-C   ergocalciferol 50,000 Units Oral Weekly Moody Pete MD   ibuprofen 200 mg Oral Q8H PRN Adline Para, CRNP   ibuprofen 400 mg Oral Q8H PRN Adline Para, CRNP   levothyroxine 88 mcg Oral Early Morning Adline Para, CRNP   lorazepam 0 5 mg Transdermal Q6H PRN Angie Shackleton, CRNP   LORazepam 1 mg Intramuscular Q4H PRN Angie Shackleton, CRNP   LORazepam 0 5 mg Oral Q6H PRN Angie Shackleton, CRNP   losartan 25 mg Oral BID Azra Huber PA-C   magnesium hydroxide 30 mL Oral Daily PRN Adline Para, CRNP   memantine 10 mg Oral BID Moody Pete MD   omeprazole (PRILOSEC) suspension 2 mg/mL 20 mg Oral Daily Moody Pete MD   sodium chloride 1 spray Each Nare Q4H PRN Azra Huber PA-C   traZODone 25 mg Oral TID Wali Bolanos MD   traZODone 50 mg Oral HS PRN Adline Para, CRNP   venlafaxine 37 5 mg Oral Daily Wali Bolanos MD       Risks / Benefits of Treatment:    Pt is unable to understand due to her advanced NCD  Counseling / Coordination of Care:    Patient's progress discussed with staff in treatment team meeting  Medications, treatment progress and treatment plan reviewed with patient      Ne Antunez MD 11/05/19

## 2019-11-06 PROCEDURE — 99232 SBSQ HOSP IP/OBS MODERATE 35: CPT | Performed by: PSYCHIATRY & NEUROLOGY

## 2019-11-06 RX ORDER — VENLAFAXINE HYDROCHLORIDE 37.5 MG/1
75 CAPSULE, EXTENDED RELEASE ORAL DAILY
Status: DISCONTINUED | OUTPATIENT
Start: 2019-11-07 | End: 2019-11-13 | Stop reason: HOSPADM

## 2019-11-06 RX ADMIN — LOSARTAN POTASSIUM 25 MG: 50 TABLET, FILM COATED ORAL at 12:27

## 2019-11-06 RX ADMIN — MEMANTINE 10 MG: 5 TABLET ORAL at 17:08

## 2019-11-06 RX ADMIN — TRAZODONE HYDROCHLORIDE 25 MG: 50 TABLET ORAL at 17:08

## 2019-11-06 RX ADMIN — GUAIFENESIN AND DEXTROMETHORPHAN 10 ML: 100; 10 SYRUP ORAL at 17:13

## 2019-11-06 RX ADMIN — TRAZODONE HYDROCHLORIDE 25 MG: 50 TABLET ORAL at 12:32

## 2019-11-06 RX ADMIN — VENLAFAXINE HYDROCHLORIDE 37.5 MG: 37.5 CAPSULE, EXTENDED RELEASE ORAL at 12:32

## 2019-11-06 RX ADMIN — AMANTADINE HYDROCHLORIDE 50 MG: 50 SOLUTION ORAL at 12:30

## 2019-11-06 RX ADMIN — TRAZODONE HYDROCHLORIDE 25 MG: 50 TABLET ORAL at 21:01

## 2019-11-06 RX ADMIN — Medication 20 MG: at 12:28

## 2019-11-06 RX ADMIN — LEVOTHYROXINE SODIUM 88 MCG: 88 TABLET ORAL at 06:44

## 2019-11-06 RX ADMIN — AMANTADINE 100 MG: 100 CAPSULE ORAL at 21:01

## 2019-11-06 RX ADMIN — MEMANTINE 10 MG: 5 TABLET ORAL at 12:27

## 2019-11-06 NOTE — PROGRESS NOTES
Wilton Amado#  VZO:[birth year] F  QDA:[id number]    VOJ:[id number]  Adm Date: 10/22/2019 2913  9:26 AM   ATT PHY: Sharona Valadez, [address]         Subjective     The patient was seen after reviewing the chart and discussing the case with caring staff  Today during our encounter, the patient reported no acute concerns  Objective     Vitals:    11/06/19 0718   BP: 136/77   Pulse: 61   Resp: 18   Temp: 98 6 °F (37 °C)   SpO2: 95%       General Appearance: Sleeping in no acute distress  HEENT: Normocephalic, atraumatic  PERRLA, EOMI, MMM  Heart: RRR, no murmurs  Normal S1 and S2   Lungs: CTA bilaterally with fair air entry  Assessment     Raisa Walsh is a(n) 68y o  year old female with dementia with behavioral disturbance     1  Alzheimer's dementia with behavioral disturbance   Patient's dementia seems to be advanced   Patient's PET CT of the brain on 10/15/2015 showed suspicion for Lewy body disease  Some of patient's symptoms including visual hallucinations and mild cogwheel rigidity a sign of parkinsonism supports that diagnosis  Patient is on low dose amantadine  There was also a suspicion for Frontotemporal Dementia with cerebral cortical atrophy   Currently patient is on Namenda 10 mg b i d  Patient is otherwise doing well with PT   2  Hypothyroidism   Levothyroxine  3  Cardiac with history of hypertension   Losartan 25mg BID  4  GERD    Protonix  5  Anorexia/weight loss   Patient has lost 5 lb over the past 3 weeks   I have consulted Nutrition for evaluation and treatment  Continue Ensure nutritional supplements after the meals  6  Gait abnormality   PT/OT  7  DJD/osteoarthritis   Patient may get Tylenol on as needed basis  8  Vitamin-D deficiency  Vitamin-D bolus doses for 10 more weeks followed by vitamin D3 1000 units daily  9  Vitamin B12 deficiency  Monthly B12 injections

## 2019-11-06 NOTE — PLAN OF CARE
Problem: Nutrition/Hydration-ADULT  Goal: Nutrient/Hydration intake appropriate for improving, restoring or maintaining nutritional needs  Description  Monitor and assess patient's nutrition/hydration status for malnutrition  Collaborate with interdisciplinary team and initiate plan and interventions as ordered  Monitor patient's weight and dietary intake as ordered or per policy  Utilize nutrition screening tool and intervene as necessary  Determine patient's food preferences and provide high-protein, high-caloric foods as appropriate  INTERVENTIONS:  - Monitor oral intake, urinary output, labs, and treatment plans  - Assess nutrition and hydration status and recommend course of action  - Evaluate amount of meals eaten  - Assist patient with eating if necessary   - Allow adequate time for meals  - Recommend/ encourage appropriate diets, oral nutritional supplements, and vitamin/mineral supplements  - Order, calculate, and assess calorie counts as needed  - Recommend, monitor, and adjust tube feedings and TPN/PPN based on assessed needs  - Assess need for intravenous fluids  - Provide specific nutrition/hydration education as appropriate  - Include patient/family/caregiver in decisions related to nutrition      10-30-19    she continues with regular and finger foods with ensure at meals  Her intake ranges from  % with ensure consumed per nursing  Her weight was 129 on 10-22 and then 130 on 10-26 with her BMI 24 58  No new labs RDN to evaluate her labs when available  She is on vitamin B 12 and D  Her skin is intact  RDN observed on rounds and staff stated that she is doing better than she was  RDN to reassess her nutrition needs at moderate risk and follow PRN   Outcome: Progressing   Patient remains on regular diet with finger foods, ensure enlive TID with meals  Meal completions 0-75%    Nursing reported patient has feeding difficulty, discussed extensively to ensure patient is offered variety of foods for adequate intakes  Nursing reported patient has preference for [facility] ensure, will change from chocolate  No new wt  to review  Will discuss recommendations for varied foods with dietary staff, passed on recommendations with nursing staff  Recommend continue diet, offer some foods that are not finger foods, staff does feed patient, also change supplement to vanilla flavor, patient may choose other flavors as well  Recommend obtain current wt

## 2019-11-06 NOTE — SOCIAL WORK
CM attempted PT check in  PT was walking with one to one in zhou  PT faced appeared to be distressed non responsive to prompts and continued to walk leaning with head facing the floor  CM reassured PT

## 2019-11-06 NOTE — PROGRESS NOTES
The patient noted to be visible sleeping quietly and without difficulty in bed upon initial assessment  The patient remained in bed until 1000 when awakened by clinical staff for am care and breakfast meal  The patient showered with assistance provided by clinical staff, the patient noted to be yelling loudly and crying while shower provided  The patient continued to remain increasingly anxious, restless, and irritable following shower  Rn attempted to administer morning medications at 1035, the patient spit out all medications and refused to eat breakfast meal offered  The patient noted to be sitting quietly in the chair on the unit at 1100  RN offered to assist patient with ambulation, the patient declined stating "I'm scared " Clinical staff provided support and reassurance  RN re-attempted to administered medications at 1230, patient agreeable to medication administration  No verbal or non-verbal complaints of pain noted  The patient's affect noted to be anxious and apprehensive  The patient ambulating with assistance of clinical staff following lunch meal  Posture noted to be more upright in comparison to assessment previous day  The patient continues to require assistance with ambulation due to varying pace of gait, confusion, poor safety awareness, poor insight, and fall risk  Intermittent non-productive cough noted throughout the shift  Lungs noted to be clear upon auscultation  Continual visual observation continues while patient awake to monitor for behaviors, safety, and fall risk  Will continue to monitor

## 2019-11-06 NOTE — PROGRESS NOTES
Progress Note - 275 Leopoldo Amado 68 y o  female MRN: [id number]   Unit/Bed#: [facility] 203-02 Encounter: [id number]    Behavior over the last 24 hours: slowly improving  Angella Net seen today, appears anxious but able to follow redirection easily  She is less agitated and no overnight outburst was reported  Pt is able to sit on chair for longer period of time presently  She is waking with assistant and still leaning to some degree to the right side  No acute change of mental status noted during exam             Staff report Pt is more redirectable but still has episodes of crying out usually when care is provided  Sleep: slept off and on  Appetite: fair  Medication side effects: none reported  ROS: none acute neurological change noted  Still shows mild rigidity on right UE      Mental Status Evaluation:    Appearance:  age appropriate   Behavior:  more redirectable   Speech:  decreased rate, scant   Mood:  slightly less anxious   Affect:  slightly less labile   Thought Process:  disorganized   Associations: concrete associations   Thought Content:  no overt delusions   Perceptual Disturbances: none   Risk Potential: Suicidal ideation - None  Homicidal ideation - None   Sensorium:  oriented to person   Memory:  poor    Consciousness:  alert and awake   Attention: poor concentration and poor attention span   Insight:  impaired   Judgment: impaired   Gait/Station: unstable gait   Motor Activity: no abnormal movements     Vital signs in last 24 hours:    Temp:  [98 °F (36 7 °C)-98 6 °F (37 °C)] 98 6 °F (37 °C)  HR:  [61-83] 61  Resp:  [18-19] 18  BP: (136-147)/(68-77) 136/77    Laboratory results: I have personally reviewed all pertinent laboratory/tests results          Assessment/Plan   Principal Problem:    Major depressive disorder, recurrent, severe with psychotic features ([facility] )  Active Problems:    Major neurocognitive disorder, due to multiple etiologies, with behavioral disturbance, severe [facility])    Recommended Treatment:     Planned medication and treatment changes: All current active medications have been reviewed  Encourage group therapy, milieu therapy and occupational therapy  Behavioral Health checks every 7 minutes   Will increase Effexor XR to 75 mg daily  Current Facility-Administered Medications:  acetaminophen 975 mg Oral Q6H PRN Melvindale Rogue, CRNP   aluminum-magnesium hydroxide-simethicone 30 mL Oral Q4H PRN Melvindale Rogue, CRNP   amantadine 100 mg Oral HS Yesi Reed MD   amantadine 50 mg Oral QAM Yesi Reed MD   [START ON 12/25/2019] cholecalciferol 1,000 Units Oral Daily Carmen Bains MD   cyanocobalamin 1,000 mcg Intramuscular Q30 Days Carmen Bains MD   dextromethorphan-guaiFENesin 10 mL Oral Q4H PRN Nova Huber PA-C   ergocalciferol 50,000 Units Oral Weekly Carmen Bains MD   ibuprofen 200 mg Oral Q8H PRN Melvindale Rogue, CRNP   ibuprofen 400 mg Oral Q8H PRN Melvindale Rogue, CRNP   levothyroxine 88 mcg Oral Early Morning Melvindale Rogue, CRNP   lorazepam 0 5 mg Transdermal Q6H PRN Estefany [facility], CRNP   LORazepam 1 mg Intramuscular Q4H PRN Estefany [facility], CRNP   LORazepam 0 5 mg Oral Q6H PRN Estefany [facility], CRNP   losartan 25 mg Oral BID Nova Huber PA-C   magnesium hydroxide 30 mL Oral Daily PRN Melvindale Rogue, CRNP   memantine 10 mg Oral BID Carmen Bains MD   omeprazole (PRILOSEC) suspension 2 mg/mL 20 mg Oral Daily Carmen Bains MD   sodium chloride 1 spray Each Nare Q4H PRN Nova Huber PA-C   traZODone 25 mg Oral TID Yesi Reed MD   traZODone 50 mg Oral HS PRN Melvindale Rogue, CRNP   [START ON 11/7/2019] venlafaxine 75 mg Oral Daily Yesi Reed MD       Risks / Benefits of Treatment:    Pt is unable to understand due to her advanced NCD  Counseling / Coordination of Care:    Patient's progress discussed with staff in treatment team meeting  Medications, treatment progress and treatment plan reviewed with patient      Dilshad Christian Christiano Shaw MD 11/06/19

## 2019-11-06 NOTE — PROGRESS NOTES
[id number]  Patient calm and cooperative for the most part this evening  She is confused and disoriented  She visited with family this evening and ambulated the halls for about an hour  She is still unsteady but does well when holding on to someone  She was compliant with all medications crushed in pudding  No outbursts noted  Will continue monitoring

## 2019-11-06 NOTE — PROGRESS NOTES
11/06/19 0958   Team Meeting   Meeting Type Daily Rounds   Team Members Present   Team Members Present Physician;Nurse;;   Physician Team Member Joanne Lerner MD; [place] , [address] [place] [address]    Nursing Team Member Tejas Little Rn    Care Management Team Member Jennifer Ayala 85, [facility]   Social Work Team Member Fiordaliza Castano    Patient/Family Present   Patient Present No   Patient's Family Present No     1:1 while awake; sleeping on and off yesterday; fast paced walk with lean to right; still sleeping this morning; family visited last evening; slept all night

## 2019-11-06 NOTE — NURSING NOTE
[phone]  Pt found to be sleeping on most of authors rounds  No acute behavioral issues noted  Q 7 min checks maintained  Fall protocol in place

## 2019-11-06 NOTE — PLAN OF CARE
Problem: Ineffective Coping  Goal: Participates in unit activities  Description  Interventions:  - Provide therapeutic environment   - Provide required programming   - Redirect inappropriate behaviors   Outcome: Not Progressing  Patient not attending groups at this time

## 2019-11-07 PROCEDURE — 99231 SBSQ HOSP IP/OBS SF/LOW 25: CPT | Performed by: PSYCHIATRY & NEUROLOGY

## 2019-11-07 RX ADMIN — TRAZODONE HYDROCHLORIDE 25 MG: 50 TABLET ORAL at 16:06

## 2019-11-07 RX ADMIN — Medication 20 MG: at 11:08

## 2019-11-07 RX ADMIN — LOSARTAN POTASSIUM 25 MG: 50 TABLET, FILM COATED ORAL at 08:37

## 2019-11-07 RX ADMIN — MEMANTINE 10 MG: 5 TABLET ORAL at 17:20

## 2019-11-07 RX ADMIN — TRAZODONE HYDROCHLORIDE 25 MG: 50 TABLET ORAL at 08:36

## 2019-11-07 RX ADMIN — TRAZODONE HYDROCHLORIDE 25 MG: 50 TABLET ORAL at 21:15

## 2019-11-07 RX ADMIN — AMANTADINE 100 MG: 100 CAPSULE ORAL at 21:15

## 2019-11-07 RX ADMIN — VENLAFAXINE HYDROCHLORIDE 75 MG: 37.5 CAPSULE, EXTENDED RELEASE ORAL at 08:37

## 2019-11-07 RX ADMIN — LEVOTHYROXINE SODIUM 88 MCG: 88 TABLET ORAL at 06:29

## 2019-11-07 RX ADMIN — LORAZEPAM 0.5 MG: 0.5 TABLET ORAL at 19:57

## 2019-11-07 RX ADMIN — AMANTADINE HYDROCHLORIDE 50 MG: 50 SOLUTION ORAL at 08:37

## 2019-11-07 RX ADMIN — LOSARTAN POTASSIUM 25 MG: 50 TABLET, FILM COATED ORAL at 17:20

## 2019-11-07 RX ADMIN — MEMANTINE 10 MG: 5 TABLET ORAL at 08:36

## 2019-11-07 NOTE — PROGRESS NOTES
Wilton Amado#  QSP:[birth year] F  [facility]:[id number]    AUC:[id number]  Adm Date: 10/22/2019 4048  9:26 AM   ATT PHY: Sam Carolina, [address]         Subjective     The patient was seen after reviewing the chart and discussing the case with caring staff  Today during our encounter, the patient reported no acute concerns  Patient seems more come although today she did not had good breakfast but she does follow verbal command  Objective     Vitals:    11/07/19 0721   BP: 112/66   Pulse: 67   Resp: 18   Temp: 99 1 °F (37 3 °C)   SpO2: 94%       General Appearance: Sleeping in no acute distress  HEENT: Normocephalic, atraumatic  PERRLA, EOMI, MMM  Heart: RRR, no murmurs  Normal S1 and S2   Lungs: CTA bilaterally with fair air entry  Assessment     Luismarlee Amado is a(n) 68y o  year old female with dementia with behavioral disturbance     1  Alzheimer's dementia with behavioral disturbance   Patient's dementia seems to be advanced   Patient's PET CT of the brain on 10/15/2015 showed suspicion for Lewy body disease  Some of patient's symptoms including visual hallucinations and mild cogwheel rigidity a sign of parkinsonism supports that diagnosis  Patient is on low dose amantadine  There was also a suspicion for Frontotemporal Dementia with cerebral cortical atrophy   Currently patient is on Namenda 10 mg b i d  Patient is otherwise doing well with PT   2  Hypothyroidism   Levothyroxine  3  Cardiac with history of hypertension   Losartan 25mg BID  4  GERD    Protonix  5  Anorexia/weight loss   Patient has lost 5 lb over the past 3 weeks   I have consulted Nutrition for evaluation and treatment  Continue Ensure nutritional supplements after the meals  6  Gait abnormality   PT/OT  7  DJD/osteoarthritis   Patient may get Tylenol on as needed basis  8  Vitamin-D deficiency   Vitamin-D bolus doses for 10 more weeks followed by vitamin D3 1000 units daily   9  Vitamin B12 deficiency  Monthly B12 injections

## 2019-11-07 NOTE — PROGRESS NOTES
The patient noted to be visible on the unit sitting quietly in recliner chair upon successive rounds throughout shift  The patient's affect noted to be depressed, fearful, and apprehensive upon initial assessment  The patient quiet, soft spoken and provides limited verbal responses  The patient compliant with morning medication administration  The patient appetite poor for breakfast meal, the patient refused to allow clinical staff to assist patient with meal and patient would not pick food or drink items up from tray  No verbal or non-verbal complaints of pain noted  The patient had one episode of yelling loudly when clinical staff assisted patient out of recliner chair  The patient ambulating in hallway with verbal cuing and redirection provided by clinical staff  The patient's gait noted to intermittently be fast paced and requires verbal cuing by clinical staff to decrease pace and to stay away from walls of hallway  The patient noted to be sitting quietly in milieu coloring with clinical staff at this time  Continual observation maintained throughout shift for behaviors, safety, and fall risk  Will continue to reevaluate need for continual observation  Will continue to monitor

## 2019-11-07 NOTE — PROGRESS NOTES
Assumed care of this patient from prior shift RN at this time  Patient is in bed sleeping, lying on her right side  She appears to be comfortable with no obvious signs of distress  Bed is in lowest position with side rails up x3  Bed alarm in place for patient safety  Will CTM via q7 minute safety checks while patient sleeps

## 2019-11-07 NOTE — SOCIAL WORK
CM met with PT for PT check in  PT indicated she is "not well", when prompted why, PT responded " I just got to go"  CM reassured PT that she is safe and to let team know if there is anything she needs  PT nodded head yes  During conversation PT was leaning to right in chair, made eye contact in conversation, flat affect

## 2019-11-07 NOTE — PROGRESS NOTES
Progress Note - 275 Leopoldo Amado 68 y o  female MRN: [id number]   Unit/Bed#: [facility] 203-02 Encounter: [id number]    Behavior over the last 24 hours: minimal improvement  Fred Colon was seen today, appears calm in chair  PT has been more redirectable and able to pace herself in the unit with supervision  She still has episodes of yelling with care  There is minimal parkinsonism on right side  Staff is focus on pt becoming more independent in the unit  Staff report limited participation in groups and on the unit  Sleep: slept off and on  Appetite: improving  Medication side effects: none  ROS: none reported    Mental Status Evaluation:    Appearance:  age appropriate   Behavior:  calm   Speech:  decreased rate, scant   Mood:  anxious   Affect:  labile   Thought Process:  disorganized   Associations: tangential associations   Thought Content:  poverty of thought   Perceptual Disturbances: vague visual hallucinations   Risk Potential: Suicidal ideation - None  Homicidal ideation - None   Sensorium:  oriented in person   Memory:  impaired   Consciousness:  alert and awake   Attention: poor concentration and poor attention span   Insight:  impaired   Judgment: impaired   Gait/Station: unstable gait   Motor Activity: no abnormal movements     Vital signs in last 24 hours:    Temp:  [98 4 °F (36 9 °C)-99 1 °F (37 3 °C)] 99 1 °F (37 3 °C)  HR:  [65-86] 67  Resp:  [18-19] 18  BP: (112-124)/(65-80) 112/66    Laboratory results: I have personally reviewed all pertinent laboratory/tests results  Assessment/Plan   Principal Problem:    Major depressive disorder, recurrent, severe with psychotic features [facility]  Active Problems:    Major neurocognitive disorder, due to multiple etiologies, with behavioral disturbance, severe ([facility] )    Recommended Treatment:     Planned medication and treatment changes:     All current active medications have been reviewed  Encourage group therapy, milieu therapy and occupational therapy  Behavioral Health checks every 7 minutes    Current Facility-Administered Medications:  acetaminophen 975 mg Oral Q6H PRN Jose Canavan, CRNP   aluminum-magnesium hydroxide-simethicone 30 mL Oral Q4H PRN Jose Canavan, CRNP   amantadine 100 mg Oral HS Margarita Olivia MD   amantadine 50 mg Oral QAM Margarita Olivia MD   [START ON 12/25/2019] cholecalciferol 1,000 Units Oral Daily Celeste Clark MD   cyanocobalamin 1,000 mcg Intramuscular Q30 Days Celeste Clark MD   dextromethorphan-guaiFENesin 10 mL Oral Q4H PRN Cresenciano Muster Cecile, PA-C   ergocalciferol 50,000 Units Oral Weekly Celeste Clark MD   ibuprofen 200 mg Oral Q8H PRN Jose Canavan, CRNP   ibuprofen 400 mg Oral Q8H PRN Jose Canavan, CRNP   levothyroxine 88 mcg Oral Early Morning Jose Canavan, CRNP   lorazepam 0 5 mg Transdermal Q6H PRN Geofm Gallegos, CRNP   LORazepam 1 mg Intramuscular Q4H PRN Geofm Gallegos, CRNP   LORazepam 0 5 mg Oral Q6H PRN Geofm Gallegos, CRNP   losartan 25 mg Oral BID Cresenciano Muster Cecile, PA-C   magnesium hydroxide 30 mL Oral Daily PRN Jose Canavan, CRNP   memantine 10 mg Oral BID Celeste Clark MD   omeprazole (PRILOSEC) suspension 2 mg/mL 20 mg Oral Daily Celeste Clark MD   sodium chloride 1 spray Each Nare Q4H PRN Cresenciano Muster Cecile, PAJosé AntonioC   traZODone 25 mg Oral TID Margarita Olivia MD   traZODone 50 mg Oral HS PRN Jose Canavan, CRNP   venlafaxine 75 mg Oral Daily Margarita Olivia MD       Risks / Benefits of Treatment:    Pt is unable to understand due to her advanced NCD    Counseling / Coordination of Care:    Pt progress and treatment plan were discussed with team  Family members ( and son) are updated about Pt's progress and meds adjustments        Johnny Chen MD 11/07/19

## 2019-11-07 NOTE — PROGRESS NOTES
[id number]  Patient present in the community with her patient observer  She remains on continual observation for safety reasons  She was ambulating at the beginning of shift but began becoming unsteady later in the evening, so was directed to sit in a recliner to rest   She has been spitting and attempting to clear mucous from her throat  Was given PRN Robitussin and was noted to have less spitting  Losartan held at dinner for SBP of 110  No yelling, tearfulness, or agitation noted  Was compliant with medications crushed in pudding  Will continue monitoring

## 2019-11-07 NOTE — PROGRESS NOTES
Patient was able to sleep throughout the night without issue  No agitation or outbursts exhibited  No PRN medications needed  Patient did wake early this AM and was toileted then assisted to a recliner chair where she is currently resting by the nurses station  Q7 minute safety checks in progress

## 2019-11-08 PROCEDURE — 99231 SBSQ HOSP IP/OBS SF/LOW 25: CPT | Performed by: PSYCHIATRY & NEUROLOGY

## 2019-11-08 RX ORDER — CLONAZEPAM 0.5 MG/1
0.25 TABLET ORAL 2 TIMES DAILY
Status: DISCONTINUED | OUTPATIENT
Start: 2019-11-08 | End: 2019-11-10

## 2019-11-08 RX ADMIN — Medication 20 MG: at 08:53

## 2019-11-08 RX ADMIN — CLONAZEPAM 0.25 MG: 0.5 TABLET ORAL at 11:18

## 2019-11-08 RX ADMIN — LOSARTAN POTASSIUM 25 MG: 50 TABLET, FILM COATED ORAL at 08:52

## 2019-11-08 RX ADMIN — MEMANTINE 10 MG: 5 TABLET ORAL at 08:51

## 2019-11-08 RX ADMIN — LOSARTAN POTASSIUM 25 MG: 50 TABLET, FILM COATED ORAL at 17:52

## 2019-11-08 RX ADMIN — VENLAFAXINE HYDROCHLORIDE 75 MG: 37.5 CAPSULE, EXTENDED RELEASE ORAL at 08:52

## 2019-11-08 RX ADMIN — LEVOTHYROXINE SODIUM 88 MCG: 88 TABLET ORAL at 06:24

## 2019-11-08 RX ADMIN — AMANTADINE 100 MG: 100 CAPSULE ORAL at 21:30

## 2019-11-08 RX ADMIN — AMANTADINE HYDROCHLORIDE 50 MG: 50 SOLUTION ORAL at 08:52

## 2019-11-08 RX ADMIN — LORAZEPAM 0.5 MG: 0.5 TABLET ORAL at 21:36

## 2019-11-08 RX ADMIN — MEMANTINE 10 MG: 5 TABLET ORAL at 17:52

## 2019-11-08 RX ADMIN — TRAZODONE HYDROCHLORIDE 25 MG: 50 TABLET ORAL at 21:30

## 2019-11-08 RX ADMIN — TRAZODONE HYDROCHLORIDE 25 MG: 50 TABLET ORAL at 16:53

## 2019-11-08 RX ADMIN — TRAZODONE HYDROCHLORIDE 25 MG: 50 TABLET ORAL at 08:52

## 2019-11-08 RX ADMIN — CLONAZEPAM 0.25 MG: 0.5 TABLET ORAL at 17:51

## 2019-11-08 NOTE — PROGRESS NOTES
The patient noted to be sleeping quietly and without difficulty upon initial assessment  The patient awakened by clinical staff for am care, the patient noted to become tearful and yell with am care provided, but noted to yell less than day previous  The patient noted to be sitting quietly in milieu at time of breakfast meal, affect noted to be quiet, anxious and apprehensive  The patient provided appropriate, but limited verbal responses at time of initial assessment  The patient denies complaints of pain  The patient compliant with medication administration and requires assistance from clinical staff with meals  At 1100, patient noted to get up unassisted from chair in milieu and pacing quickly in hallway  Pace of gait noted to be near-running, the patient's gait unsteady, patient slightly leans to the right, patient exhibits poor safety awareness and insight with ambulation, the patient noted to come into close proximity of walls and doors and  patient requires constant verbal redirection, direction and verbal cues from clinical staff to decrease pace, direct patient in direction of ambulation on the unit  Patient noted to be restless, anxious, and minimally responsive to provided interventions from clinical staff  New order noted for Klonopin 0 25 mg to be administered twice daily, first dose administered at 1118  The patient noted to be visible sitting in milieu with lunch meal  The patient noted to have intermittent periods of restlessness and anxiety throughout remainder of the shift  The patient noted to be more responsive to verbal redirection, distraction techniques and support provided by clinical staff after lunch meal  Will continue to monitor

## 2019-11-08 NOTE — PLAN OF CARE
Problem: Prexisting or High Potential for Compromised Skin Integrity  Goal: Skin integrity is maintained or improved  Description  INTERVENTIONS:  - Identify patients at risk for skin breakdown  - Assess and monitor skin integrity  - Assess and monitor nutrition and hydration status  - Monitor labs   - Assess for incontinence   - Turn and reposition patient  - Assist with mobility/ambulation  - Relieve pressure over bony prominences  - Avoid friction and shearing  - Provide appropriate hygiene as needed including keeping skin clean and dry  - Evaluate need for skin moisturizer/barrier cream  - Collaborate with interdisciplinary team   - Patient/family teaching  - Consider wound care consult   Outcome: Progressing     Problem: ANXIETY  Goal: Will report anxiety at manageable levels  Description  INTERVENTIONS:  - Administer medication as ordered  - Teach and encourage coping skills  - Provide emotional support  - Assess patient/family for anxiety and ability to cope  Outcome: Progressing     Problem: SLEEP DISTURBANCE  Goal: Will exhibit normal sleeping pattern  Description  Interventions:  -  Assess the patients sleep pattern, noting recent changes  - Administer medication as ordered  - Decrease environmental stimuli, including noise, as appropriate during the night  - Encourage the patient to actively participate in unit groups and or exercise during the day to enhance ability to achieve adequate sleep at night  - Assess the patient, in the morning, encouraging a description of sleep experience  Outcome: Progressing     Problem: Alteration in Orientation  Goal: Allow medical examinations, as recommended  Description  Interventions:  - Provide physical/neurological exams and/or referrals, per provider   Outcome: Progressing  Goal: Cooperate with recommended testing/procedures  Description  Interventions:  - Determine need for ancillary testing  - Observe for mental status changes  - Implement falls/precaution protocol   Outcome: Progressing     Problem: Nutrition/Hydration-ADULT  Goal: Nutrient/Hydration intake appropriate for improving, restoring or maintaining nutritional needs  Description  Monitor and assess patient's nutrition/hydration status for malnutrition  Collaborate with interdisciplinary team and initiate plan and interventions as ordered  Monitor patient's weight and dietary intake as ordered or per policy  Utilize nutrition screening tool and intervene as necessary  Determine patient's food preferences and provide high-protein, high-caloric foods as appropriate  INTERVENTIONS:  - Monitor oral intake, urinary output, labs, and treatment plans  - Assess nutrition and hydration status and recommend course of action  - Evaluate amount of meals eaten  - Assist patient with eating if necessary   - Allow adequate time for meals  - Recommend/ encourage appropriate diets, oral nutritional supplements, and vitamin/mineral supplements  - Order, calculate, and assess calorie counts as needed  - Recommend, monitor, and adjust tube feedings and TPN/PPN based on assessed needs  - Assess need for intravenous fluids  - Provide specific nutrition/hydration education as appropriate  - Include patient/family/caregiver in decisions related to nutrition      10-30-19    she continues with regular and finger foods with ensure at meals  Her intake ranges from  % with ensure consumed per nursing  Her weight was 129 on 10-22 and then 130 on 10-26 with her BMI 24 58  No new labs RDN to evaluate her labs when available  She is on vitamin B 12 and D  Her skin is intact  RDN observed on rounds and staff stated that she is doing better than she was   RDN to reassess her nutrition needs at moderate risk and follow PRN   Outcome: Progressing

## 2019-11-08 NOTE — PROGRESS NOTES
Assumed care of this patient from prior shift RN at this time  Patient is in bed sleeping; appears to be comfortable and in no acute distress  Bed is in lowest position with side rails up x3  Bed alarm in place for patient safety  Yellow tread socks at the bed side  Q7 minute safety checks in place

## 2019-11-08 NOTE — PROGRESS NOTES
[id number]  Patient visible in the community throughout this shift  Patient's 1:1 DC  Tab alarm maintained for safety  She has had moments of tearfulness and restlessness  During visiting, patient was very tearful and difficult to console  She was given PRN Ativan and it was effective  Patient was calm and redirectable throughout the rest of the evening  When she begins ambulating quickly, she tolerates redirection well  Does not appear to have any pain  Was compliant with all medications crushed in pudding  Will continue monitoring

## 2019-11-08 NOTE — PROGRESS NOTES
Patient compliant with 1700 medications and meals  Pleasant and cooperative this shift  Patient sitting with peers in the dinning room  No behaviors noted this shift  Q 7 mins checks in place for safety  Will continue to monitor for behaviors and changes

## 2019-11-08 NOTE — PROGRESS NOTES
11/08/19 1009   Team Meeting   Meeting Type Daily Rounds   Team Members Present   Team Members Present Physician;Nurse;;; Occupational Therapist   Physician Team Member Dr Johnny Chen MD; [facility] [address]    Nursing Team Member Sammy Jacobs, MARIOLA    Care Management Team Member Steven Elaine MS, [facility]   Social Work Team Member Jenise Hess [facility]    OT Team Member Jonathan Zaidi [place]    Patient/Family Present   Patient Present No   Patient's Family Present No     trialed off 1:1 yesterday; sitting in dining room; episode of screaming, tearfulness, fearful; prn utilized - effective; slept in bed

## 2019-11-08 NOTE — PROGRESS NOTES
Patient remains in bed sleeping at this time  She was able to sleep throughout the night without any apparent difficulty  No outbursts or agitation observed  Bed remains in lowest position with side rails up x3  Bed alarm in place for patient safety

## 2019-11-08 NOTE — PLAN OF CARE
Problem: Ineffective Coping  Goal: Participates in unit activities  Description  Interventions:  - Provide therapeutic environment   - Provide required programming   - Redirect inappropriate behaviors   Outcome: Progressing ambulatory

## 2019-11-08 NOTE — SOCIAL WORK
CM met with PT for PT check in  PT was pacing quickly in zhou, face looked distressed  PT did reflect she enjoys walking  Minimal eye contact and verbal response

## 2019-11-08 NOTE — PROGRESS NOTES
Wilton Amado#  [id number] F  AVN:[id number]    VUN:[id number]  Adm Date: 10/22/2019 5483  9:26 AM   ATT PHY: Danni Huang, [facility]         [facility]     The patient was seen after reviewing the chart and discussing the case with caring staff  Today during our encounter, the patient reported no acute concerns  She was nonverbal during my encounter  No new issues noted by caring staff  Objective     Vitals:    11/08/19 0700   BP: 169/71   Pulse: 76   Resp: 18   Temp: 99 3 °F (37 4 °C)   SpO2: 100%       General Appearance: Sleeping in no acute distress  HEENT: Normocephalic, atraumatic  PERRLA, EOMI, MMM  Heart: RRR, no murmurs  Normal S1 and S2   Lungs: CTA bilaterally with fair air entry  Assessment     Frankey Dross Nespoli is a(n) 68y o  year old female with dementia with behavioral disturbance     1  Alzheimer's dementia with behavioral disturbance   Patient's dementia seems to be advanced   Patient's PET CT of the brain on 10/15/2015 showed suspicion for Lewy body disease  Some of patient's symptoms including visual hallucinations and mild cogwheel rigidity a sign of parkinsonism supports that diagnosis  Patient is on low dose amantadine  There was also a suspicion for Frontotemporal Dementia with cerebral cortical atrophy   Currently patient is on Namenda 10 mg b i d  Patient is otherwise doing well with PT   2  Hypothyroidism   Levothyroxine  3  Cardiac with history of hypertension   Losartan 25mg BID  4  GERD    Protonix  5  Anorexia/weight loss   Patient has lost 5 lb over the past 3 weeks   I have consulted Nutrition for evaluation and treatment  Continue Ensure nutritional supplements after the meals  6  Gait abnormality   PT/OT  7  DJD/osteoarthritis   Patient may get Tylenol on as needed basis  8  Vitamin-D deficiency  Vitamin-D bolus doses for 10 more weeks followed by vitamin D3 1000 units daily    9  Vitamin B12 deficiency  Monthly B12 injections  The patient was discussed with Dr Zackary Ku and he is in agreement with the above note

## 2019-11-08 NOTE — PLAN OF CARE
Potential D/C next week to [address] [place] [address],[facility]

## 2019-11-08 NOTE — SOCIAL WORK
CM spoke with PT  for PT check in  PT  reflected being sad about being away from PT as they are very close and often spent a great deal of time together  PT  indicated he spoke to the doctor earlier whom answered the questions he had and reflected on PT status  PT  indicated he is going to speak to his brother about coming to visit outside of visiting hours  CM indicated she would be able to assist with this to let her know when he would like to come and we would be more than happy to accommodate  PT  indicated he is not certain at this time but would follow up with CM when he has an idea  CM encouraged PT  to reach out to her with any needs or concerns, PT  agreed to do so  Call ended mutually  [phone]

## 2019-11-08 NOTE — PROGRESS NOTES
Progress Note - 275 Leopoldo Amado 68 y o  female MRN: [id number]   Unit/Bed#: [facility] 203-02 Encounter: [id number]    Behavior over the last 24 hours: slowly improving  Kvng Marie was seen today  She appears less anxious and easily redirectable  She continues pacing at fast pace with  with mild leaning to the right side  She is noted to drink and eat some fries in the dining area by herself  Kvng Marie is able to verbalize her needs easily but still shows increase anxiety and restlessness in the morning  Staff report minimal participation in groups and on the unit  Sleep: slept better  Appetite: poor  Medication side effects: none noted  ROS: No acute focal neurological or change of mental status noted      Mental Status Evaluation:    Appearance:  age appropriate   Behavior:  restless   Speech:  delayed   Mood:  anxious   Affect:  constricted   Thought Process:  disorganized   Associations: tangential associations   Thought Content:  poverty of thought   Perceptual Disturbances: none   Risk Potential: Suicidal ideation - None  Homicidal ideation - None   Sensorium:  oriented to person, place and time/date   Memory:  recent and remote memory: unable to assess due to lack of cooperation   Consciousness:  alert and awake   Attention: poor concentration and poor attention span   Insight:  impaired   Judgment: impaired   Gait/Station: unstable gait   Motor Activity: no abnormal movements     Vital signs in last 24 hours:    Temp:  [99 2 °F (37 3 °C)-100 1 °F (37 8 °C)] 99 3 °F (37 4 °C)  HR:  [69-77] 76  Resp:  [16-21] 18  BP: (127-169)/(61-74) 169/71    Laboratory results: I have personally reviewed all pertinent laboratory/tests results          Assessment/Plan   Principal Problem:    Major depressive disorder, recurrent, severe with psychotic features ([facility] )  Active Problems:    Major neurocognitive disorder, due to multiple etiologies, with behavioral disturbance, severe [facility])    Recommended Treatment:     Planned medication and treatment changes: All current active medications have been reviewed  Encourage group therapy, milieu therapy and occupational therapy  Behavioral Health checks every 7 minutes   Will begin Klonopin 0 25 mg bid     Current Facility-Administered Medications:  acetaminophen 975 mg Oral Q6H PRN Wells Ou, CRNP   aluminum-magnesium hydroxide-simethicone 30 mL Oral Q4H PRN Wells Ou, CRNP   amantadine 100 mg Oral HS Dyan Carter MD   amantadine 50 mg Oral QAM Dyan Carter MD   [START ON 12/25/2019] cholecalciferol 1,000 Units Oral Daily Glo Yan MD   clonazePAM 0 25 mg Oral BID Dyan Carter MD   cyanocobalamin 1,000 mcg Intramuscular Q30 Days Glo Yan MD   dextromethorphan-guaiFENesin 10 mL Oral Q4H PRN Laurel Huber PA-C   ergocalciferol 50,000 Units Oral Weekly Glo Yan MD   ibuprofen 200 mg Oral Q8H PRN Wells Ou, CRNP   ibuprofen 400 mg Oral Q8H PRN Wells Ou, CRNP   levothyroxine 88 mcg Oral Early Morning Wells Ou, CRNP   lorazepam 0 5 mg Transdermal Q6H PRN Genevive Benson, CRNP   LORazepam 1 mg Intramuscular Q4H PRN Genevive Benson, CRNP   LORazepam 0 5 mg Oral Q6H PRN Genevive Benson, CRNP   losartan 25 mg Oral BID Laurel Huber PA-C   magnesium hydroxide 30 mL Oral Daily PRN Wells Ou, CRNP   memantine 10 mg Oral BID Glo Yan MD   omeprazole (PRILOSEC) suspension 2 mg/mL 20 mg Oral Daily Glo Yan MD   sodium chloride 1 spray Each Nare Q4H PRN Laurel Huber PA-C   traZODone 25 mg Oral TID Dyan Carter MD   traZODone 50 mg Oral HS PRN Wells Ou, CRNP   venlafaxine 75 mg Oral Daily Dyan Carter MD       Risks / Benefits of Treatment:    Pt is unable to understand due to her advanced NCD   was contacted and updated on Pt's progress and meds adjustment      Counseling / Coordination of Care:    Patient's progress discussed with staff in treatment team meeting  Medication changes reviewed with staff in treatment team meeting      Hung English MD 11/08/19

## 2019-11-09 PROCEDURE — 99231 SBSQ HOSP IP/OBS SF/LOW 25: CPT | Performed by: PSYCHIATRY & NEUROLOGY

## 2019-11-09 RX ADMIN — MEMANTINE 10 MG: 5 TABLET ORAL at 08:45

## 2019-11-09 RX ADMIN — LOSARTAN POTASSIUM 25 MG: 50 TABLET, FILM COATED ORAL at 08:45

## 2019-11-09 RX ADMIN — AMANTADINE HYDROCHLORIDE 50 MG: 50 SOLUTION ORAL at 10:15

## 2019-11-09 RX ADMIN — MEMANTINE 10 MG: 5 TABLET ORAL at 17:00

## 2019-11-09 RX ADMIN — CLONAZEPAM 0.25 MG: 0.5 TABLET ORAL at 17:00

## 2019-11-09 RX ADMIN — VENLAFAXINE HYDROCHLORIDE 75 MG: 37.5 CAPSULE, EXTENDED RELEASE ORAL at 08:44

## 2019-11-09 RX ADMIN — LOSARTAN POTASSIUM 25 MG: 50 TABLET, FILM COATED ORAL at 17:00

## 2019-11-09 RX ADMIN — TRAZODONE HYDROCHLORIDE 25 MG: 50 TABLET ORAL at 08:44

## 2019-11-09 RX ADMIN — LORAZEPAM 0.5 MG: 0.5 TABLET ORAL at 12:14

## 2019-11-09 RX ADMIN — CLONAZEPAM 0.25 MG: 0.5 TABLET ORAL at 08:43

## 2019-11-09 RX ADMIN — Medication 0.5 MG: at 23:24

## 2019-11-09 NOTE — NURSING NOTE
[phone]  Pt found to be sleeping on most of authors rounds  No acute behavioral issues noted  Q 7 min checks maintained  Fall protocol in place

## 2019-11-09 NOTE — PROGRESS NOTES
Progress Note - Alysa Estrada 68 y o  female MRN: [id number]    Unit/Bed#: [facility] 203-02 Encounter: [id number]      Assessment:  1  Alzheimer's dementia with behavioral disturbance   Patient's dementia seems to be advanced     2  Hypothyroidism   Levothyroxine  3  Cardiac with history of hypertension   Losartan 25mg BID  4  GERD    Protonix  5  Anorexia/weight loss   Patient has lost 5 lb over the past 3 weeks    consulted Nutrition for evaluation and treatment  Continue Ensure nutritional supplements   6  Gait abnormality   PT/OT  7  DJD/osteoarthritis   Patient may get Tylenol on as needed basis  8  Vitamin-D deficiency  Supplemented  9  Vitamin B12 deficiency  Supplemented  Plan:  As above    Subjective:   Patient unable to offer subjective complaint secondary to her mental status    Objective:     Vitals: Blood pressure 140/66, pulse 66, temperature 99 4 °F (37 4 °C), temperature source Temporal, resp  rate 18, height 5' 1" (1 549 m), weight 59 kg (130 lb 1 1 oz), SpO2 94 %  ,Body mass index is 24 58 kg/m²        Intake/Output Summary (Last 24 hours) at 11/9/2019 1854  Last data filed at 11/9/2019 1719  Gross per 24 hour   Intake 240 ml   Output --   Net 240 ml       Physical Exam: /66 (BP Location: Left arm)   Pulse 66   Temp 99 4 °F (37 4 °C) (Temporal)   Resp 18   Ht 5' 1" (1 549 m)   Wt 59 kg (130 lb 1 1 oz)   SpO2 94%   BMI 24 58 kg/m²     General Appearance:    Alert, cooperative, no distress, appears stated age   Head:    Normocephalic, without obvious abnormality, atraumatic   Eyes:    PERRL, conjunctiva/corneas clear, EOM's intact, fundi     benign, both eyes               Neck:   Supple, symmetrical, trachea midline, no adenopathy;     thyroid:  no enlargement/tenderness/nodules; no carotid    bruit or JVD   Back:     Symmetric, no curvature, ROM normal, no CVA tenderness   Lungs:     Clear to auscultation bilaterally, respirations unlabored   Chest Wall:    No tenderness or deformity Heart:    Regular rate and rhythm, S1 and S2 normal, no murmur, rub   or gallop       Abdomen:     Soft, non-tender, bowel sounds active all four quadrants,     no masses, no organomegaly           Extremities:   Extremities normal, atraumatic, no cyanosis or edema   Pulses:   2+ and symmetric all extremities   Skin:   Skin color, texture, turgor normal, no rashes or lesions   Lymph nodes:   Cervical, supraclavicular, and axillary nodes normal   Neurologic:   CNII-XII intact, normal strength, sensation and reflexes     throughout        Invasive Devices     None                 Lab, Imaging and other studies: I have personally reviewed pertinent reports

## 2019-11-09 NOTE — NURSING NOTE
PRN anxiolytic effective; pt hysteria calmed and patient able to settle for bed Q 7 min checks ongoing  Fall precautions in place

## 2019-11-09 NOTE — PROGRESS NOTES
Patient sitting up in bed early this morning; appeared to be anxious, minimally verbal, mumbled speech  Medication admin before shower, pt yelled out about 2 times during care, mostly calm  Pt ate some breakfast after shower and is currently resting in chair quietly  Pt ambulated to shower room from room, slight lean to the right during ambulation  Q 7 min checks maintained  Monitoring continues

## 2019-11-09 NOTE — PROGRESS NOTES
Pt medicated with ativan 0 5MG PO PRN for restless anxiety, Butts Anxiety Scale Score of 20  She was pacing around the unit, telling staff that she needed to find something, but forgot what it was  She was ambulated around the unit over the past 30 minutes  She is now seated for lunch  Will monitor for therapeutic effects of interventions  Pt checked Q7 minutes for safety

## 2019-11-09 NOTE — PROGRESS NOTES
Progress Note - 275 Leopoldo Amado 68 y o  female MRN: [id number]   Unit/Bed#: [facility] 203-02 Encounter: [id number]    Behavior over the last 24 hours: minimal improvement  Da Esquivel was seen today, appears mild somnolence but able to answer simple questions with short words  She is able to sit still longer and have her meals with   She continuous pacing fast at times and yelled out during care but in less intensity  Staff report minimal participation in groups and on the unit  Sleep: normal  Appetite: poor  Medication side effects: denies  ROS: does not answer  No acute focal neurological or change of MS noted    Mental Status Evaluation:    Appearance:  age appropriate   Behavior:  guarded   Speech:  delayed, impoverished   Mood:  anxious   Affect:  constricted   Thought Process:  disorganized   Associations: concrete associations   Thought Content:  no overt delusions   Perceptual Disturbances: none   Risk Potential: Suicidal ideation - None  Homicidal ideation - None   Sensorium:  oriented to person   Memory:  recent and remote memory: unable to assess due to lack of cooperation   Consciousness:  alert and awake   Attention: poor concentration and poor attention span   Insight:  impaired   Judgment: impaired   Gait/Station: unstable gait   Motor Activity: no abnormal movements     Vital signs in last 24 hours:    Temp:  [97 5 °F (36 4 °C)-99 4 °F (37 4 °C)] 99 °F (37 2 °C)  HR:  [58-73] 62  Resp:  [18] 18  BP: (121-152)/(62-78) 137/63    Laboratory results: I have personally reviewed all pertinent laboratory/tests results  Assessment/Plan   Principal Problem:    Major depressive disorder, recurrent, severe with psychotic features ([email or web address] 75 )  Active Problems:    Major neurocognitive disorder, due to multiple etiologies, with behavioral disturbance, severe ([facility] )    Recommended Treatment:     Planned medication and treatment changes:     All current active medications have been reviewed  Encourage group therapy, milieu therapy and occupational therapy  [address] checks every 7 minutes    Current Facility-Administered Medications:  acetaminophen 975 mg Oral Q6H PRN Zygmunt Bilberry, CRNP   aluminum-magnesium hydroxide-simethicone 30 mL Oral Q4H PRN Zygmunt Bilberry, CRNP   amantadine 100 mg Oral HS Dariana Mojica MD   amantadine 50 mg Oral QAM Dariana Mojica MD   [START ON 12/25/2019] cholecalciferol 1,000 Units Oral Daily Dixie Cr MD   clonazePAM 0 25 mg Oral BID Dariana Mojica MD   cyanocobalamin 1,000 mcg Intramuscular Q30 Days Dixie Cr MD   dextromethorphan-guaiFENesin 10 mL Oral Q4H PRN EluterEdkimo Mckinley Huber PA-C   ergocalciferol 50,000 Units Oral Weekly Dixie Cr MD   ibuprofen 200 mg Oral Q8H PRN Zygmunt Bilberry, CRNP   ibuprofen 400 mg Oral Q8H PRN Zygmunt Bilberry, CRNP   levothyroxine 88 mcg Oral Early Morning Zygmunt Bilberry, CRNP   lorazepam 0 5 mg Transdermal Q6H PRN Liban Puff, CRNP   LORazepam 1 mg Intramuscular Q4H PRN Liban Puff, CRNP   LORazepam 0 5 mg Oral Q6H PRN Liban Puff, CRNP   losartan 25 mg Oral BID LuisuterEdkimo Mckinley Huber, PA-C   magnesium hydroxide 30 mL Oral Daily PRN Zygmunt Bilberry, CRNP   memantine 10 mg Oral BID Dixie Cr MD   omeprazole (PRILOSEC) suspension 2 mg/mL 20 mg Oral Daily Dixie Cr MD   sodium chloride 1 spray Each Nare Q4H PRN YobongoANNETTE EscobarC   traZODone 25 mg Oral TID Dariana Mojica MD   traZODone 50 mg Oral HS PRN Zygmunt Bilangel, CRNP   venlafaxine 75 mg Oral Daily Dariana Mojica MD       Risks / Benefits of Treatment:    Pt is unable to understand due to her advanced NCD    Counseling / Coordination of Care:    Medications and treatment plan are coordinated with Pt's families as needed      Kenzie Reyes MD 11/09/19

## 2019-11-09 NOTE — NURSING NOTE
Pt present in the milieu; up at susan; disoriented x 3  Responds to name  PRN ativan provided for profound anxiety; pt hysterical crying inconsolably demonstrating a lot fear with corresponding external stimuli  Will continue to monitor

## 2019-11-10 PROCEDURE — 99231 SBSQ HOSP IP/OBS SF/LOW 25: CPT | Performed by: PSYCHIATRY & NEUROLOGY

## 2019-11-10 RX ORDER — CLONAZEPAM 0.5 MG/1
0.5 TABLET ORAL
Status: DISCONTINUED | OUTPATIENT
Start: 2019-11-10 | End: 2019-11-11

## 2019-11-10 RX ORDER — CLONAZEPAM 0.5 MG/1
0.25 TABLET ORAL DAILY
Status: DISCONTINUED | OUTPATIENT
Start: 2019-11-11 | End: 2019-11-11

## 2019-11-10 RX ADMIN — CLONAZEPAM 0.25 MG: 0.5 TABLET ORAL at 10:23

## 2019-11-10 RX ADMIN — TRAZODONE HYDROCHLORIDE 25 MG: 50 TABLET ORAL at 21:28

## 2019-11-10 RX ADMIN — AMANTADINE 100 MG: 100 CAPSULE ORAL at 21:28

## 2019-11-10 RX ADMIN — TRAZODONE HYDROCHLORIDE 25 MG: 50 TABLET ORAL at 16:52

## 2019-11-10 RX ADMIN — MEMANTINE 10 MG: 5 TABLET ORAL at 10:23

## 2019-11-10 RX ADMIN — Medication 20 MG: at 10:22

## 2019-11-10 RX ADMIN — LORAZEPAM 0.5 MG: 0.5 TABLET ORAL at 12:32

## 2019-11-10 RX ADMIN — CLONAZEPAM 0.5 MG: 0.5 TABLET ORAL at 21:28

## 2019-11-10 RX ADMIN — VENLAFAXINE HYDROCHLORIDE 75 MG: 37.5 CAPSULE, EXTENDED RELEASE ORAL at 10:24

## 2019-11-10 RX ADMIN — TRAZODONE HYDROCHLORIDE 25 MG: 50 TABLET ORAL at 10:35

## 2019-11-10 RX ADMIN — MEMANTINE 10 MG: 5 TABLET ORAL at 17:48

## 2019-11-10 RX ADMIN — LEVOTHYROXINE SODIUM 88 MCG: 88 TABLET ORAL at 06:44

## 2019-11-10 RX ADMIN — LOSARTAN POTASSIUM 25 MG: 50 TABLET, FILM COATED ORAL at 17:48

## 2019-11-10 RX ADMIN — LOSARTAN POTASSIUM 25 MG: 50 TABLET, FILM COATED ORAL at 10:24

## 2019-11-10 NOTE — PROGRESS NOTES
Pt woken up by 10 am, Am care performed, pt anxious and crying during care  Pt did not want breakfast  Pt took AM medications in 5 tiny bites of vanilla pudding otherwise pt spits out  Pt ambulated with assistance but needed prompting to slow down  Pt sitting in chair currently, calm and soothed by staff  Q 7 min checks maintained

## 2019-11-10 NOTE — PROGRESS NOTES
Patient very labile, fearful, crying  Remains by nurses station in a chair  Ativan gel applied to back  Will monitor effects

## 2019-11-10 NOTE — PROGRESS NOTES
Pt observed to be out in the community, sitting in her chair  Her affect was labile, mood labile  The ativan she received at 1200, was affective at alleviating her anxiety  However, after dinner, she was restless, anxious, yelling out and tearful  She was moved to the nurses' station and offered her HS medications  She spit all of them out, along with her HS snack  Pt was tearful, consoled for 10 minutes before she was finally calm  Pt checked Q7 minutes for safety  TABS maintained for fall prevention

## 2019-11-10 NOTE — PROGRESS NOTES
Patient calmed down shortly after receiving ativan gel  Assisted to bed  Has been sleeping well since  Q 7 minute safety checks along with bed alarm maintained for safety

## 2019-11-10 NOTE — PROGRESS NOTES
Progress Note - 275 Leopoldo Amado 68 y o  female MRN: [id number]   Unit/Bed#: [facility] 203-02 Encounter: [id number]    Behavior over the last 24 hours: minimal improvement  Danna Hair was seen today  She appears very anxious and scared specially in the morning hours  She continues yelling out intermittently while walking with staff member assistant  Patient is redirectable most of the time  She continued pacing fast with slightly leaning to the right side  No increase confusion or disorganized behavior was noted  Staff report limited in groups and on the unit  Patient received p r n  Ativan last night for increased anxiety and restlessness  Sleep: slept better  Appetite: fair  Medication side effects: none  ROS: no complaints    Mental Status Evaluation:    Appearance:  age appropriate   Behavior:  slightly less redirectable   Speech:  scant, impoverished   Mood:  anxious   Affect:  labile   Thought Process:  disorganized   Associations: concrete associations   Thought Content:  poverty of thought   Perceptual Disturbances: none   Risk Potential: Suicidal ideation - None  Homicidal ideation - None   Sensorium:  oriented to person   Memory:  impaired   Consciousness:  alert and awake   Attention: poor concentration and poor attention span   Insight:  impaired   Judgment: impaired   Gait/Station: unstable gait   Motor Activity: no abnormal movements     Vital signs in last 24 hours:    Temp:  [98 7 °F (37 1 °C)-99 4 °F (37 4 °C)] 98 7 °F (37 1 °C)  HR:  [66-74] 67  Resp:  [16-18] 16  BP: (131-187)/(66-98) 131/76    Laboratory results: I have personally reviewed all pertinent laboratory/tests results          Assessment/Plan   Principal Problem:    Major depressive disorder, recurrent, severe with psychotic features ([facility] )  Active Problems:    Major neurocognitive disorder, due to multiple etiologies, with behavioral disturbance, severe ([facility] )    Recommended Treatment:     Planned medication and treatment changes: All current active medications have been reviewed  Encourage group therapy, milieu therapy and occupational therapy  Behavioral Health checks every 7 minutes   Will increase Klonopin to 0 5 mg HS    Current Facility-Administered Medications:  acetaminophen 975 mg Oral Q6H PRN Wali Magyar, CRNP   aluminum-magnesium hydroxide-simethicone 30 mL Oral Q4H PRN Wali Magyar, CRNP   amantadine 100 mg Oral HS Mariela Cunningham MD   amantadine 50 mg Oral QAM Mariela Cunningham MD   [START ON 12/25/2019] cholecalciferol 1,000 Units Oral Daily Alfredo Juan MD   clonazePAM 0 25 mg Oral BID Mariela Cunningham MD   cyanocobalamin 1,000 mcg Intramuscular Q30 Days Alfredo Juan MD   dextromethorphan-guaiFENesin 10 mL Oral Q4H PRN Elyce Prader Rockovits, PA-C   ergocalciferol 50,000 Units Oral Weekly Alfredo Juan MD   ibuprofen 200 mg Oral Q8H PRN Wali Magyar, CRNP   ibuprofen 400 mg Oral Q8H PRN Wali Magyar, CRNP   levothyroxine 88 mcg Oral Early Morning Wali Magyar, CRNP   lorazepam 0 5 mg Transdermal Q6H PRN Koreen Cockayne, CRNP   LORazepam 1 mg Intramuscular Q4H PRN Jodee Rezane, CRNP   LORazepam 0 5 mg Oral Q6H PRN Jodee Rezane, CRNP   losartan 25 mg Oral BID Elyce Prader Rockovits, PA-C   magnesium hydroxide 30 mL Oral Daily PRN Wali Magyar, CRNP   memantine 10 mg Oral BID Alfredo Juan MD   omeprazole (PRILOSEC) suspension 2 mg/mL 20 mg Oral Daily Alfredo Juan MD   sodium chloride 1 spray Each Nare Q4H PRN Elyce Prader Rockovits, PA-C   traZODone 25 mg Oral TID Mariela Cunningham MD   traZODone 50 mg Oral HS PRN Wali Escalante, SINA   venlafaxine 75 mg Oral Daily Mariela Cunningham MD       Risks / Benefits of Treatment:    Pt is unable to understand due to her advanced NCD    Counseling / Coordination of Care:    Patient's progress discussed with staff in treatment team meeting  Medication changes reviewed with staff in treatment team meeting      Allerton Naomi, MD 11/10/19

## 2019-11-10 NOTE — PROGRESS NOTES
Patient currently sleeping in bed  No distress noted  Will admin AM medications upon wakening  Q 7 min checks maintained

## 2019-11-10 NOTE — PROGRESS NOTES
At 1232, Pt medicated with ativan 0 5MG PO PRN for restless anxiety, Butts Anxiety Scale Score of 22  Pt was tearful, restless, and inconsolable  She repeated ou tloud  several times "Hurry" and "I want to go home" but did not answer any questions appropriately  Pt accompanied by staff for ambulation over the last 25 minutes to help alleviate her anxiety  Pt maintained by nurses' station for safety

## 2019-11-11 PROCEDURE — 93005 ELECTROCARDIOGRAM TRACING: CPT

## 2019-11-11 PROCEDURE — 99232 SBSQ HOSP IP/OBS MODERATE 35: CPT | Performed by: NURSE PRACTITIONER

## 2019-11-11 RX ORDER — CLONAZEPAM 0.5 MG/1
0.5 TABLET ORAL ONCE
Status: COMPLETED | OUTPATIENT
Start: 2019-11-11 | End: 2019-11-11

## 2019-11-11 RX ORDER — CLONAZEPAM 0.5 MG/1
0.5 TABLET ORAL DAILY
Status: DISCONTINUED | OUTPATIENT
Start: 2019-11-12 | End: 2019-11-13 | Stop reason: HOSPADM

## 2019-11-11 RX ORDER — CLONAZEPAM 0.5 MG/1
0.5 TABLET ORAL DAILY
Status: DISCONTINUED | OUTPATIENT
Start: 2019-11-11 | End: 2019-11-11

## 2019-11-11 RX ORDER — CLONAZEPAM 0.5 MG/1
0.5 TABLET ORAL
Status: DISCONTINUED | OUTPATIENT
Start: 2019-11-11 | End: 2019-11-13 | Stop reason: HOSPADM

## 2019-11-11 RX ADMIN — VENLAFAXINE HYDROCHLORIDE 75 MG: 37.5 CAPSULE, EXTENDED RELEASE ORAL at 10:36

## 2019-11-11 RX ADMIN — LOSARTAN POTASSIUM 25 MG: 50 TABLET, FILM COATED ORAL at 17:01

## 2019-11-11 RX ADMIN — Medication 20 MG: at 12:50

## 2019-11-11 RX ADMIN — LEVOTHYROXINE SODIUM 88 MCG: 88 TABLET ORAL at 06:07

## 2019-11-11 RX ADMIN — MEMANTINE 10 MG: 5 TABLET ORAL at 17:01

## 2019-11-11 RX ADMIN — TRAZODONE HYDROCHLORIDE 25 MG: 50 TABLET ORAL at 17:01

## 2019-11-11 RX ADMIN — AMANTADINE 100 MG: 100 CAPSULE ORAL at 20:42

## 2019-11-11 RX ADMIN — Medication 0.5 MG: at 09:55

## 2019-11-11 RX ADMIN — LOSARTAN POTASSIUM 25 MG: 50 TABLET, FILM COATED ORAL at 10:35

## 2019-11-11 RX ADMIN — MEMANTINE 10 MG: 5 TABLET ORAL at 10:35

## 2019-11-11 RX ADMIN — CLONAZEPAM 0.5 MG: 0.5 TABLET ORAL at 12:50

## 2019-11-11 RX ADMIN — AMANTADINE HYDROCHLORIDE 50 MG: 50 SOLUTION ORAL at 12:50

## 2019-11-11 RX ADMIN — CLONAZEPAM 0.5 MG: 0.5 TABLET ORAL at 20:42

## 2019-11-11 RX ADMIN — TRAZODONE HYDROCHLORIDE 25 MG: 50 TABLET ORAL at 20:42

## 2019-11-11 RX ADMIN — TRAZODONE HYDROCHLORIDE 25 MG: 50 TABLET ORAL at 10:36

## 2019-11-11 NOTE — PROGRESS NOTES
Progress Note - 275 Leopoldo Amado 68 y o  female MRN: [id number]   Unit/Bed#: [facility] 203-02 Encounter: [id number]    Behavior over the last 24 hours: unchanged  Brandie Ware continues anxious and overwhelmed with intermittent hyperventilating  She has been manageable off the 1:1  When calmer, she can participate minimally in conversation  She can be resistive with medications, but for the most part is compliant  Fearful and yelling out with care  She has required PRN ativan at least once per day  With some effectiveness  Anxiety can be worse in the AM   Will increase AM Klonopin to 0 5 mg and give at 6 AM     Not able to participate in milieu  Sleep: improved  Appetite: poor, fair, needs to be fed  Medication side effects: No   ROS: no complaints    Mental Status Evaluation:    Appearance:  marginal hygiene, apprehensive   Behavior:  psychomotor agitation, restless and fidgety   Speech:  nonsensical, aphasia   Mood:  anxious   Affect:  tearful, , fearful   Thought Process:  disorganized   Associations: concrete associations   Thought Content:  no overt delusions   Perceptual Disturbances: appears preoccupied   Risk Potential: Suicidal ideation - None  Homicidal ideation - None  Potential for aggression - No   Sensorium:  oriented to person   Memory:  recent memory severely impaired   Consciousness:  alert and awake   Attention: poor concentration and poor attention span   Insight:  significantly impaired   Judgment: significantly impaired   Gait/Station: unstable gait   Motor Activity: no abnormal movements     Vital signs in last 24 hours:    Temp:  [98 2 °F (36 8 °C)-99 7 °F (37 6 °C)] 98 7 °F (37 1 °C)  HR:  [67-73] 67  Resp:  [16-18] 18  BP: (122-141)/(54-75) 133/75    Laboratory results: I have personally reviewed all pertinent laboratory/tests results      Suicide/Homicide Risk Assessment:    Risk of Harm to Self:   Current Specific Risk Factors include: current anxiety symptoms  Protective Factors: no current suicidal plan or intent, taking medications as ordered on the unit, responds to redirection  Based on today's assessment, Danna Hair presents the following risk of harm to self: minimal    Risk of Harm to Others:  Current Specific Risk Factors include: impaired cognition  Protective Factors: no current homicidal ideation, follows staff redirection  Based on today's assessment, Danna Hair presents the following risk of harm to others: minimal    The following interventions are recommended: behavioral checks every 7 minutes, continued hospitalization on locked unit     Progress Toward Goals: still very anxious, still confused, does not participate in groups    Assessment/Plan   Principal Problem:    Major depressive disorder, recurrent, severe with psychotic features ([facility] )  Active Problems:    Major neurocognitive disorder, due to multiple etiologies, with behavioral disturbance, severe ([facility] )    Recommended Treatment:     Planned medication and treatment changes:     All current active medications have been reviewed  Encourage group therapy, milieu therapy and occupational therapy  Behavioral Health checks every 7 minutes  Increase Klonopin 0 5 mg twice daily    Current Facility-Administered Medications:  acetaminophen 975 mg Oral Q6H PRN Nannie Filter, CRNP   aluminum-magnesium hydroxide-simethicone 30 mL Oral Q4H PRN Nannie Filter, SINA   amantadine 100 mg Oral HS Camron George MD   amantadine 50 mg Oral QAM Camron George MD   [START ON 12/25/2019] cholecalciferol 1,000 Units Oral Daily Mary Vernon MD   clonazePAM 0 25 mg Oral Daily Camron George MD   clonazePAM 0 5 mg Oral HS Camron George MD   cyanocobalamin 1,000 mcg Intramuscular Q30 Days Mary Vernon MD   dextromethorphan-guaiFENesin 10 mL Oral Q4H PRN Rin Huber PA-C   ergocalciferol 50,000 Units Oral Weekly Mary Vernon MD   ibuprofen 200 mg Oral Q8H PRN Nannie Filter, SINA   ibuprofen 400 mg Oral Q8H PRN Zygmunt Veronica, CRNP   levothyroxine 88 mcg Oral Early Morning Robert Quiroz, SINA   lorazepam 0 5 mg Transdermal Q6H PRN Liban Puff, CRERIK   LORazepam 1 mg Intramuscular Q4H PRN Liban Puff, CRERIK   LORazepam 0 5 mg Oral Q6H PRN Liban Puff, CRNP   losartan 25 mg Oral BID Luisutertalia Huber PA-C   magnesium hydroxide 30 mL Oral Daily PRN Efren Martin, SINA   memantine 10 mg Oral BID Dixie Cr MD   omeprazole (PRILOSEC) suspension 2 mg/mL 20 mg Oral Daily Dixie Cr MD   sodium chloride 1 spray Each Nare Q4H PRN Tanya Huber PA-C   traZODone 25 mg Oral TID Dariana Mojica MD   traZODone 50 mg Oral HS PRN Efren Martin, SINA   venlafaxine 75 mg Oral Daily Dariana Mojica MD       Risks / Benefits of Treatment:    Risks, benefits, and possible side effects of medications explained to patient and patient verbalizes understanding and agreement for treatment  Counseling / Coordination of Care:    Patient's progress discussed with staff in treatment team meeting  Medications, treatment progress and treatment plan reviewed with patient      SINA Varghese 11/11/19

## 2019-11-11 NOTE — PROGRESS NOTES
Maintain bed tab alarm due to poor safety awareness and gait weakness  Patient appears to be sleeping throughout the night during Q 7 minute safety checks  No behavioral issues  Will continue to monitor safety and behaviors every 7 minutes

## 2019-11-11 NOTE — SOCIAL WORK
CM met with PT for PT check I  PT wanted to hold CM hand, at onset PT was tearful, but with encouragement and validation, PT calmed and sat relaxed  PT declined needing anything at this time

## 2019-11-11 NOTE — PROGRESS NOTES
The patient's appetite continues to be poor for lunch meal  Clinical staff provided assistance with meal and encouragement to eat and drink  The patient refused to eat or drink  Clinical staff continues to offer food and fluids throughout the shift  Will continue to monitor

## 2019-11-11 NOTE — PLAN OF CARE
Problem: Prexisting or High Potential for Compromised Skin Integrity  Goal: Skin integrity is maintained or improved  Description  INTERVENTIONS:  - Identify patients at risk for skin breakdown  - Assess and monitor skin integrity  - Assess and monitor nutrition and hydration status  - Monitor labs   - Assess for incontinence   - Turn and reposition patient  - Assist with mobility/ambulation  - Relieve pressure over bony prominences  - Avoid friction and shearing  - Provide appropriate hygiene as needed including keeping skin clean and dry  - Evaluate need for skin moisturizer/barrier cream  - Collaborate with interdisciplinary team   - Patient/family teaching  - Consider wound care consult   Outcome: Progressing     Problem: Alteration in Orientation  Goal: Allow medical examinations, as recommended  Description  Interventions:  - Provide physical/neurological exams and/or referrals, per provider   Outcome: Progressing  Goal: Cooperate with recommended testing/procedures  Description  Interventions:  - Determine need for ancillary testing  - Observe for mental status changes  - Implement falls/precaution protocol   Outcome: Progressing     Problem: DEPRESSION  Goal: Will be euthymic at discharge  Description  INTERVENTIONS:  - Administer medication as ordered  - Provide emotional support via 1:1 interaction with staff  - Encourage involvement in milieu/groups/activities  - Monitor for social isolation  Outcome: Not Progressing     Problem: ANXIETY  Goal: By discharge: Patient will verbalize 2 strategies to deal with anxiety  Description  Interventions:  - Identify any obvious source/trigger to anxiety  - Staff will assist patient in applying identified coping technique/skills  - Encourage attendance of scheduled groups and activities  Outcome: Not Progressing     Problem: Alteration in Thoughts and Perception  Goal: Verbalize thoughts and feelings  Description  Interventions:  - Promote a nonjudgmental and trusting relationship with the patient through active listening and therapeutic communication  - Assess patient's level of functioning, behavior and potential for risk  - Engage patient in 1 on 1 interactions  - Encourage patient to express fears, feelings, frustrations, and discuss symptoms    - [place] patient to reality, help patient recognize reality-based thinking   - Administer medications as ordered and assess for potential side effects  - Provide the patient education related to the signs and symptoms of the illness and desired effects of prescribed medications  Outcome: Not Progressing  Note:   Patient unable to verbalized due to confusion/disorientation  Problem: Nutrition/Hydration-ADULT  Goal: Nutrient/Hydration intake appropriate for improving, restoring or maintaining nutritional needs  Description  Monitor and assess patient's nutrition/hydration status for malnutrition  Collaborate with interdisciplinary team and initiate plan and interventions as ordered  Monitor patient's weight and dietary intake as ordered or per policy  Utilize nutrition screening tool and intervene as necessary  Determine patient's food preferences and provide high-protein, high-caloric foods as appropriate       INTERVENTIONS:  - Monitor oral intake, urinary output, labs, and treatment plans  - Assess nutrition and hydration status and recommend course of action  - Evaluate amount of meals eaten  - Assist patient with eating if necessary   - Allow adequate time for meals  - Recommend/ encourage appropriate diets, oral nutritional supplements, and vitamin/mineral supplements  - Order, calculate, and assess calorie counts as needed  - Recommend, monitor, and adjust tube feedings and TPN/PPN based on assessed needs  - Assess need for intravenous fluids  - Provide specific nutrition/hydration education as appropriate  - Include patient/family/caregiver in decisions related to nutrition      10-30-19    she continues with regular and finger foods with ensure at meals  Her intake ranges from  % with ensure consumed per nursing  Her weight was 129 on 10-22 and then 130 on 10-26 with her BMI 24 58  No new labs RDN to evaluate her labs when available  She is on vitamin B 12 and D  Her skin is intact  RDN observed on rounds and staff stated that she is doing better than she was   RDN to reassess her nutrition needs at moderate risk and follow PRN   Outcome: Not Progressing  Note:   Patient appetite poor

## 2019-11-11 NOTE — PROGRESS NOTES
Patient noted to be intermittently crying out in dining room  At [phone], patient noted to get up unassisted from chair in dining room  The patient noted to be apprehensive, increasingly anxious and restless  Clinical staff assisted patient with ambulation in the hallway  The patient's gait unsteady, pace of gait fast, respiratory rate 24 bpm during ambulation, the patient exhibits poor safety awareness and insight with ambulation and requires staff assistance, constant verbal cuing, direction, and redirection  The patient noted to be increasingly anxious, support, encouragement, and redirection provided by clinical staff ineffective  The patient stating "I'm not going to make it, I'm going to die " The RN questioned patient if having pain, the patient stated "my chest " Patient assisted to recliner chair, vital signs obtained, Rn notified Rachell Hobson pa-c of patient's complaints  New order noted to obtain EKG  EKG obtained at 1229, patient tolerated well  Rn notified clark Meyers, that patient did not receive am dose of Klonopin due to prn ativan administered  New orders noted  No further complaints noted of pain or discomfort at this time  Rachell Hobson pa-c notified of EKG results  Will continue to monitor

## 2019-11-11 NOTE — PROGRESS NOTES
11/11/19 0955   Team Meeting   Meeting Type Daily Rounds   Team Members Present   Team Members Present Physician;Nurse;;; Occupational Therapist   Physician Team Member Dr Randell Dominguez MD; Jaswinder Blanco [address] [place] [address]    Nursing Team Member Ayo Ryan, MARIOLA    Care Management Team Member Dae Andres MS, [facility]   Social Work Team Member Ann Johnson [place]    OT Team Member [place], [place]   Patient/Family Present   Patient Present No   Patient's Family Present No     Tearful, restless, crying out; regressing with appetite; no creakfast this morning; prn utilized yesterday for restless anxiety

## 2019-11-11 NOTE — PROGRESS NOTES
[id number]  Patient confused and disoriented x4  She is restless and tearful  Difficult to redirect and console for the most part  Medications are crushed in pudding and patient spit out all dinner time medications and ate very minimally overall  Patient maintained in chair with tab alarm for safety  Will continue monitoring

## 2019-11-11 NOTE — PROGRESS NOTES
Wilton Amado#  YNT:[id number] F  [facility]:[id number]    KDK:[id number]  Adm Date: 10/22/2019 4232  9:26 AM   ATT PHY: Vic Conley, [address]         Subjective     The patient was seen after reviewing the chart and discussing the case with caring staff  Today during our encounter, the patient reported no acute concerns  She was nonverbal during my encounter  No new issues noted by caring staff  Objective     Vitals:    11/11/19 0857   BP: 133/75   Pulse: 67   Resp: 18   Temp: 98 7 °F (37 1 °C)   SpO2: 96%       General Appearance: Sleeping in no acute distress  HEENT: Normocephalic, atraumatic  PERRLA, EOMI, MMM  Heart: RRR, no murmurs  Normal S1 and S2   Lungs: CTA bilaterally with fair air entry  Assessment     Pritixavier Amado is a(n) 68y o  year old female with dementia with behavioral disturbance     1  Alzheimer's dementia with behavioral disturbance   Patient's dementia seems to be advanced   Patient's PET CT of the brain on 10/15/2015 showed suspicion for Lewy body disease  Some of patient's symptoms including visual hallucinations and mild cogwheel rigidity a sign of parkinsonism supports that diagnosis  Patient is on low dose amantadine  There was also a suspicion for Frontotemporal Dementia with cerebral cortical atrophy   Currently patient is on Namenda 10 mg b i d  Patient is otherwise doing well with PT   2  Hypothyroidism   Levothyroxine  3  Cardiac with history of hypertension   Losartan 25mg BID  4  GERD    Protonix  5  Anorexia/weight loss   Patient has lost 5 lb over the past 3 weeks   I have consulted Nutrition for evaluation and treatment  Continue Ensure nutritional supplements after the meals  6  Gait abnormality   PT/OT  7  DJD/osteoarthritis   Patient may get Tylenol on as needed basis  8  Vitamin-D deficiency  Vitamin-D bolus doses for 9 more weeks followed by vitamin D3 1000 units daily    9  Vitamin B12 deficiency  Monthly B12 injections  The patient was discussed with Dr Zackary Ku and he is in agreement with the above note

## 2019-11-11 NOTE — SOCIAL WORK
Dr KATHLEEN , spoke with PT  and reviewed potential D/C plan for Wednesday  Reviewed PT baseline, progress, medication and follow up care  PT  in agreement  PT  wishes to transport PT on Wednesday, he will come with his son around noon  PT  wishes to touch base with  at time of D/C,   In agreement  Call ended mutually  [phone] placed call to [facility] and spoke with Morgan Daigle and informed her that PT  is in agreement to PT returning on Wednesday  Morgan Daigle relayed to [place] all in agreement with D/C  Notified that PT  will be picking PT up here at noon  Report to [facility], fax D/C to summary [phone]  Call ended mutually

## 2019-11-11 NOTE — PROGRESS NOTES
Progress Note - Ivon Eastman 68 y o  female MRN: [id number]    Unit/Bed#: [facility] 203-02 Encounter: [id number]      Assessment:  1  Alzheimer's dementia with behavioral disturbance   Patient's dementia seems to be advanced     2  Hypothyroidism   Levothyroxine  3  Cardiac with history of hypertension   Losartan 25mg BID  4  GERD    Protonix  5  Anorexia/weight loss   Patient has lost 5 lb over the past 3 weeks    consulted Nutrition for evaluation and treatment  Continue Ensure nutritional supplements   6  Gait abnormality   PT/OT  7  DJD/osteoarthritis   Patient may get Tylenol on as needed basis  8  Vitamin-D deficiency  Supplemented  9  Vitamin B12 deficiency  Supplemented    Plan:  See above    Subjective:   Patient unable    Objective:     Vitals: Blood pressure 122/54, pulse 73, temperature 98 2 °F (36 8 °C), temperature source Temporal, resp  rate 18, height 5' 1" (1 549 m), weight 58 4 kg (128 lb 12 oz), SpO2 95 %  ,Body mass index is 24 33 kg/m²        Intake/Output Summary (Last 24 hours) at 11/10/2019 1908  Last data filed at 11/9/2019 2021  Gross per 24 hour   Intake 60 ml   Output --   Net 60 ml       Physical Exam: /54 (BP Location: Right arm)   Pulse 73   Temp 98 2 °F (36 8 °C) (Temporal)   Resp 18   Ht 5' 1" (1 549 m)   Wt 58 4 kg (128 lb 12 oz)   SpO2 95%   BMI 24 33 kg/m²     General Appearance:    Alert, cooperative, no distress, appears stated age   Head:    Normocephalic, without obvious abnormality, atraumatic   Eyes:    PERRL, conjunctiva/corneas clear, EOM's intact, fundi     benign, both eyes           Throat:   Lips, mucosa, and tongue normal; teeth and gums normal   Neck:   Supple, symmetrical, trachea midline, no adenopathy;     thyroid:  no enlargement/tenderness/nodules; no carotid    bruit or JVD   Back:     Symmetric, no curvature, ROM normal, no CVA tenderness   Lungs:     Clear to auscultation bilaterally, respirations unlabored   Chest Wall:    No tenderness or deformity    Heart:    Regular rate and rhythm, S1 and S2 normal, no murmur, rub   or gallop       Abdomen:     Soft, non-tender, bowel sounds active all four quadrants,     no masses, no organomegaly           Extremities:   Extremities normal, atraumatic, no cyanosis or edema   Pulses:   2+ and symmetric all extremities   Skin:   Skin color, texture, turgor normal, no rashes or lesions   Lymph nodes:   Cervical, supraclavicular, and axillary nodes normal   Neurologic:   CNII-XII intact, normal strength, sensation and reflexes     throughout        Invasive Devices     None                 Lab, Imaging and other studies: I have personally reviewed pertinent reports

## 2019-11-11 NOTE — PROGRESS NOTES
The patient noted to be restless, tearful, anxious, and yelling loudly upon awakening, with care, and while sitting in the dining room  Patient in area of decreased stimuli, clinical staff provided support and reassurance  The patient continued to remain tearful, anxious, and restless upon successive rounds  The patient refused to eat breakfast meal despite encouragement and assistance provided by clinical staff  The patient would not drink liquids, unable to administered am dose of Protonix and symmetrel due to patient not drinking liquids  Butts anxiety scale score 22, as needed ativan gel administered at 0955 due to patient's increased anxiety and restlessness  Scheduled dose of Klonopin 0 25 not administered due to patient receiving as needed ativan  Patient noted to be anxious and tearful, but more calm at time of reassessment following administration of ativan at 1030  The patient noted to be sitting quietly in dining room at this time  Flacc score 0  Will continue to monitor

## 2019-11-11 NOTE — PROGRESS NOTES
Pt has been intermittently anxious all day, with episodes of tearfulness and restlessness  During these episodes the patient was inconsolable, but was able to be calmed through ambulation, sipping fluids, toileting and speaking to her softly with clear direction  Her affect was labile, mood was labile and anxious  During periods of restless anxiety, Pt was maintained with staff continuously within arms reach, otherwise she was maintained in the day room and observed by day room staff  Pt med compliant, but appetite has been mostly poor, less than 25% for breakfast and lunch, about 75% for dinner  Pt maintained on Q7 checks for safety

## 2019-11-11 NOTE — SOCIAL WORK
CM placed call to [facility] [phone]  CM spoke with Estelle Estrada and provided her update on PT status and invited her out to come and assess PT  Estelle Estrada indicated that she is ok with PT returning and that she does not need to come on site to assess PT as long as PT is ambulating  CM indicated PT is ambulating with close observation for safety purposes  Estelle Estrada asked that PT  is in agreement to patient returning  CM indicated she will call PT  and provide update and notfiy of PT D/C on Wednesday if he is in agreement with baseline  CM to call Estelle Estrada back with confirmation  Medical Dr Edwige Newell MD and psych Dr Liza Cast MD  Call ended mutually

## 2019-11-12 PROCEDURE — 99232 SBSQ HOSP IP/OBS MODERATE 35: CPT | Performed by: NURSE PRACTITIONER

## 2019-11-12 RX ORDER — TRAZODONE HYDROCHLORIDE 50 MG/1
25 TABLET ORAL 3 TIMES DAILY
Qty: 45 TABLET | Refills: 0 | Status: SHIPPED | OUTPATIENT
Start: 2019-11-12

## 2019-11-12 RX ORDER — CLONAZEPAM 0.5 MG/1
0.5 TABLET ORAL 2 TIMES DAILY
Qty: 60 TABLET | Refills: 0 | Status: SHIPPED | OUTPATIENT
Start: 2019-11-12

## 2019-11-12 RX ORDER — ERGOCALCIFEROL 1.25 MG/1
50000 CAPSULE ORAL WEEKLY
Qty: 10 CAPSULE | Refills: 0 | Status: SHIPPED | OUTPATIENT
Start: 2019-11-13 | End: 2020-01-16

## 2019-11-12 RX ORDER — CYANOCOBALAMIN 1000 UG/ML
1000 INJECTION INTRAMUSCULAR; SUBCUTANEOUS
Qty: 1 ML | Refills: 1 | Status: SHIPPED | OUTPATIENT
Start: 2019-11-22

## 2019-11-12 RX ORDER — AMANTADINE HYDROCHLORIDE 50 MG/5ML
50 SOLUTION ORAL EVERY MORNING
Qty: 140 ML | Refills: 0 | Status: SHIPPED | OUTPATIENT
Start: 2019-11-13

## 2019-11-12 RX ORDER — MELATONIN
1000 DAILY
Qty: 30 TABLET | Refills: 1 | Status: SHIPPED | OUTPATIENT
Start: 2019-12-25

## 2019-11-12 RX ORDER — LEVOTHYROXINE SODIUM 88 UG/1
88 TABLET ORAL
Qty: 30 TABLET | Refills: 1 | Status: SHIPPED | OUTPATIENT
Start: 2019-11-13

## 2019-11-12 RX ORDER — VENLAFAXINE HYDROCHLORIDE 75 MG/1
75 CAPSULE, EXTENDED RELEASE ORAL DAILY
Qty: 30 CAPSULE | Refills: 0 | Status: SHIPPED | OUTPATIENT
Start: 2019-11-13

## 2019-11-12 RX ORDER — LOSARTAN POTASSIUM 25 MG/1
25 TABLET ORAL 2 TIMES DAILY
Qty: 60 TABLET | Refills: 1 | Status: SHIPPED | OUTPATIENT
Start: 2019-11-12

## 2019-11-12 RX ORDER — PANTOPRAZOLE SODIUM 20 MG/1
40 TABLET, DELAYED RELEASE ORAL DAILY
Qty: 30 TABLET | Refills: 1 | Status: SHIPPED | OUTPATIENT
Start: 2019-11-12 | End: 2019-11-13 | Stop reason: HOSPADM

## 2019-11-12 RX ORDER — MEMANTINE HYDROCHLORIDE 10 MG/1
10 TABLET ORAL 2 TIMES DAILY
Qty: 60 TABLET | Refills: 1 | Status: SHIPPED | OUTPATIENT
Start: 2019-11-12

## 2019-11-12 RX ORDER — AMANTADINE HYDROCHLORIDE 100 MG/1
100 CAPSULE, GELATIN COATED ORAL
Qty: 30 CAPSULE | Refills: 0 | Status: SHIPPED | OUTPATIENT
Start: 2019-11-12

## 2019-11-12 RX ORDER — CLONAZEPAM 0.5 MG/1
0.5 TABLET ORAL 2 TIMES DAILY
Qty: 60 TABLET | Refills: 0
Start: 2019-11-12 | End: 2019-11-12

## 2019-11-12 RX ORDER — CLONAZEPAM 0.5 MG/1
0.5 TABLET ORAL DAILY
Qty: 10 TABLET | Refills: 0 | Status: SHIPPED | OUTPATIENT
Start: 2019-11-13 | End: 2019-11-12

## 2019-11-12 RX ADMIN — TRAZODONE HYDROCHLORIDE 25 MG: 50 TABLET ORAL at 08:55

## 2019-11-12 RX ADMIN — CLONAZEPAM 0.5 MG: 0.5 TABLET ORAL at 08:54

## 2019-11-12 RX ADMIN — MEMANTINE 10 MG: 5 TABLET ORAL at 08:54

## 2019-11-12 RX ADMIN — AMANTADINE 100 MG: 100 CAPSULE ORAL at 20:48

## 2019-11-12 RX ADMIN — LOSARTAN POTASSIUM 25 MG: 50 TABLET, FILM COATED ORAL at 08:55

## 2019-11-12 RX ADMIN — Medication 20 MG: at 08:55

## 2019-11-12 RX ADMIN — TRAZODONE HYDROCHLORIDE 25 MG: 50 TABLET ORAL at 20:47

## 2019-11-12 RX ADMIN — VENLAFAXINE HYDROCHLORIDE 75 MG: 37.5 CAPSULE, EXTENDED RELEASE ORAL at 08:55

## 2019-11-12 RX ADMIN — LOSARTAN POTASSIUM 25 MG: 50 TABLET, FILM COATED ORAL at 18:08

## 2019-11-12 RX ADMIN — CLONAZEPAM 0.5 MG: 0.5 TABLET ORAL at 20:48

## 2019-11-12 RX ADMIN — AMANTADINE HYDROCHLORIDE 50 MG: 50 SOLUTION ORAL at 08:55

## 2019-11-12 RX ADMIN — LEVOTHYROXINE SODIUM 88 MCG: 88 TABLET ORAL at 08:54

## 2019-11-12 RX ADMIN — MEMANTINE 10 MG: 5 TABLET ORAL at 18:08

## 2019-11-12 RX ADMIN — Medication 0.5 MG: at 17:51

## 2019-11-12 RX ADMIN — TRAZODONE HYDROCHLORIDE 25 MG: 50 TABLET ORAL at 18:08

## 2019-11-12 NOTE — PROGRESS NOTES
Wilton Amado#  SYR:[birth year] F  CTN:[id number]    [facility]:[id number]  Adm Date: 10/22/2019 3341  9:26 AM   ATT PHY: Sara Sommers, [address]         Subjective     The patient was seen after reviewing the chart and discussing the case with caring staff  Today during our encounter, the patient reported no acute concerns  She was nonverbal during my encounter  No new issues noted by caring staff other than poor dietary intake    Of note, patient is scheduled for discharge tomorrow  Patient is medically cleared for discharge  Medication list has been reconciled  Scripts have been filled out  Objective     Vitals:    11/11/19 2041   BP: 152/89   Pulse: 70   Resp: 20   Temp: 98 8 °F (37 1 °C)   SpO2: 94%       General Appearance: Sleeping in no acute distress  HEENT: Normocephalic, atraumatic  PERRLA, EOMI, MMM  Heart: RRR, no murmurs  Normal S1 and S2   Lungs: CTA bilaterally with fair air entry  Assessment     Sotero krishna(n) 68y o  year old female with dementia with behavioral disturbance    Medical Clearance: Patient is medically cleared for discharge  Medication list has been reconciled  Scripts have been filled out      1  Alzheimer's dementia with behavioral disturbance   Patient's dementia seems to be advanced   Patient's PET CT of the brain on 10/15/2015 showed suspicion for Lewy body disease  Some of patient's symptoms including visual hallucinations and mild cogwheel rigidity a sign of parkinsonism supports that diagnosis  Patient is on low dose amantadine  There was also a suspicion for Frontotemporal Dementia with cerebral cortical atrophy   Currently patient is on Namenda 10 mg b i d  Patient is otherwise doing well with PT   2  Hypothyroidism   Levothyroxine  3  Cardiac with history of hypertension   Losartan 25mg BID  4  GERD    Protonix    5  Anorexia/weight loss   Patient has lost 5 lb over the past 3 weeks   I have consulted Nutrition for evaluation and treatment  Continue Ensure nutritional supplements after the meals  6  Gait abnormality   PT/OT  7  DJD/osteoarthritis   Patient may get Tylenol on as needed basis  8  Vitamin-D deficiency  Vitamin-D bolus doses for 9 more weeks followed by vitamin D3 1000 units daily  9  Vitamin B12 deficiency  Monthly B12 injections  The patient was discussed with Dr Marvin Perkins and he is in agreement with the above note

## 2019-11-12 NOTE — PROGRESS NOTES
RN notified Bertin Styles pa-c of the patient's poor appetite  The patient noted to be sleeping quietly and without difficulty upon successive rounds, the patient awakened at 1040 by mhts for am care and assisted patient to shower  The patient noted to be anxious, tearful, crying, and apprehensive during shower  The noted to be anxious and restless following shower, ambulated in zhou with the assistance of clinical staff  The patient noted to be sitting quietly and calmly in chair on unit at this time  Will continue to monitor

## 2019-11-12 NOTE — PROGRESS NOTES
The patient noted to be resting quietly in bed, but awakened easily to RN verbal stimuli and noted to provide appropriate simple verbal responses  The patient states "I'm grouchy," but noted to smile upon awakening  The patient denies complaints of pain  The patient agreeable to sitting up while in bed and compliant with morning medication administration  The patient declined to get out of bed despite multiple RN attempts, stating "I'll just stay here awhile " FLACC score 0  Will continue to monitor

## 2019-11-12 NOTE — DISCHARGE INSTR - APPOINTMENTS
The treatment team recommends ongoing medication management upon discharge  You will be seen by your current psychiatric provider Dr Clhoé Carreon MD within 7 business days of return to [address] [place] [address]  Your discharge will be faxed to this provider for continuity of care  You will be seen by the facility medical provider Dr Gregorio López MD at [phone] within 15 business days of return to the facility  Your discharge will be faxed to this provider for continuity of care

## 2019-11-12 NOTE — NURSING NOTE
Pt present in the milieu; Became highly anxious acting very fearful making statements like, "get him away we need to go!" Pt removed from communal area and ambulated around unit; provided scheduled anxiolytic  Will monitor effectiveness of interventions

## 2019-11-12 NOTE — SOCIAL WORK
CM placed call to [facility], PT is on locked secure memory unit, CM will have DME completed as appropriate  [phone]

## 2019-11-12 NOTE — DISCHARGE INSTR - OTHER ORDERS
You are being discharged to your home located at [address] located at [address] [facility] [address]  Phone: [phone]  Triggers you have identified during your hospitalization that led to your distressed mood, include regression in physical and mental health  Coping skills you have identified during your hospitalization include walking and music  If you are unable to deal with your distressed mood alone please contact a member of the care team at [address] at  or your  S[place]as [place]as at   If that is not effective and you continue to have distressed mood, overwhelmed, in crisis please contact [facility] Intervention: [phone], call 911, or go to the emergency room  [facility] Intervention: ([phone]) [phone]  *National Suicide Prevention Lifeline:  7-232.588.3193  *Peer Support Talk Line (Seven days a week, 1:00 PM - 9:00 PM) Call: 387.607.9102 or Text: [address] on [address] [facility] ([facility]) HELPLINE: 350.423.1609/Website: www eli org  *Substance Abuse and [address]) [employer], which is a confidential, free, 24-hour-a-day, 365-day-a-year, information service for individuals and family members facing mental health and/or substance use disorders  This service provides referrals to local treatment facilities, support groups, and community-based organizations  Callers can also order free publications and other information  Call 2-960.303.1340/Website: www [facility] gov  *[facility] 2-1-1: This is a toll free, confidential, 24-hour-a-day service which connects you to a community  in your area who can help you find services and resources that are available to you locally and provide critical services that can improve and save lives    Call: 211  /Website: https://[email or web address] marinelli/

## 2019-11-12 NOTE — PROGRESS NOTES
Progress Note - [address] 68 y o  female MRN: [id number]   Unit/Bed#: [facility] 203-02 Encounter: [id number]    Behavior over the last 24 hours: unchanged  Frankey Dross woke up calmer today, but did become upset in the shower with care  She has episodes of being able to sit calmly for long periods, and decreased episodes of becoming more fearful and anxious and cries out  She was calmed today by being pushed around in chair  Her appetite remains poor  Does not interact with peers in milieu  Not able to participate in milieu  Sleep: normal  Appetite: poor  Medication side effects: No   ROS: no complaints    Mental Status Evaluation:    Appearance:  age appropriate   Behavior:  remains guarded, poor eye contact   Speech:  nonsensical, aphasia   Mood:  anxious   Affect:  blunted   Thought Process:  disorganized   Associations: concrete associations   Thought Content:  some paranoia   Perceptual Disturbances: appears preoccupied   Risk Potential: Suicidal ideation - None  Homicidal ideation - None  Potential for aggression - No   Sensorium:  oriented to person   Memory:  recent memory impaired, remote memory impaired   Consciousness:  alert and awake   Attention: poor concentration and poor attention span   Insight:  nil   Judgment: poor   Gait/Station: unstable gait   Motor Activity: no abnormal movements     Vital signs in last 24 hours:    Temp:  [98 8 °F (37 1 °C)-99 °F (37 2 °C)] 98 8 °F (37 1 °C)  HR:  [69-70] 70  Resp:  [20] 20  BP: (129-152)/(62-89) 152/89    Laboratory results: I have personally reviewed all pertinent laboratory/tests results      Suicide/Homicide Risk Assessment:    Risk of Harm to Self:   Current Specific Risk Factors include: impaired cognition  Protective Factors: no current suicidal plan or intent, taking medications as ordered on the unit, responds to redirection  Based on today's assessment, Frankey Dross presents the following risk of harm to self: none    Risk of Harm to Others:  Current Specific Risk Factors include: none  Protective Factors: no current homicidal ideation  Based on today's assessment, Brooklyn Wallace presents the following risk of harm to others: none    The following interventions are recommended: behavioral checks every 7 minutes, continued hospitalization on locked unit     Progress Toward Goals: minimal improvement, still anxious, still paranoid    Assessment/Plan   Principal Problem:    Major depressive disorder, recurrent, severe with psychotic features ([facility] [address] )  Active Problems:    Major neurocognitive disorder, due to multiple etiologies, with behavioral disturbance, severe ([facility] [address] )    Recommended Treatment:     Planned medication and treatment changes:     All current active medications have been reviewed  Encourage group therapy, milieu therapy and occupational therapy  Behavioral Health checks every 7 minutes  Continue current medications:    Current Facility-Administered Medications:  acetaminophen 975 mg Oral Q6H PRN SINA Kee   aluminum-magnesium hydroxide-simethicone 30 mL Oral Q4H PRN Dulce Santoro, SINA   amantadine 100 mg Oral HS Mahad Deutsch MD   amantadine 50 mg Oral QAM Mahad Deutsch MD   [START ON 12/25/2019] cholecalciferol 1,000 Units Oral Daily Nancie Velasquez MD   clonazePAM 0 5 mg Oral Daily SINA Montes De Oca   clonazePAM 0 5 mg Oral HS SINA Montes De [place]ca   cyanocobalamin 1,000 mcg Intramuscular Q30 Days Nancie Velasquez MD   dextromethorphan-guaiFENesin 10 mL Oral Q4H PRN Wilfrido Huber PA-C   ergocalciferol 50,000 Units Oral Weekly Nancie Velasquez MD   ibuprofen 200 mg Oral Q8H PRN Dulce Santoro, SINA   ibuprofen 400 mg Oral Q8H PRN Dulce Santoro, SINA   levothyroxine 88 mcg Oral Early Morning Dulce Santoro, SINA   lorazepam 0 5 mg Transdermal Q6H PRN Amarilys Swanson, SINA   LORazepam 1 mg Intramuscular Q4H PRN SINA Montes De Oca   LORazepam 0 5 mg Oral Q6H PRN SINA Montes De Oca   losartan 25 mg Oral BID Zully Huber PA-C   magnesium hydroxide 30 mL Oral Daily PRN SINA Souza   memantine 10 mg Oral BID Raisa Velazco MD   omeprazole (PRILOSEC) suspension 2 mg/mL 20 mg Oral Daily Raisa Velazco MD   sodium chloride 1 spray Each Nare Q4H PRN Zully Huber PA-C   traZODone 25 mg Oral TID Amaury Lawrence MD   traZODone 50 mg Oral HS PRN SINA Souza   venlafaxine 75 mg Oral Daily Amaury Lawrence MD       Risks / Benefits of Treatment:    Risks, benefits, and possible side effects of medications explained to patient  Patient has limited understanding of risks and benefits of treatment at this time, but agrees to take medications as prescribed  Counseling / Coordination of Care:    Patient's progress discussed with staff in treatment team meeting  Medications, treatment progress and treatment plan reviewed with patient      SINA Mackenzie 11/12/19

## 2019-11-12 NOTE — SOCIAL WORK
CM met with PT for PT check in  PT was relaxed and responded that she is ok, PT did not appear to be distressed

## 2019-11-12 NOTE — SOCIAL WORK
CM placed call to [place] [phone]  CM spoke with Maya-nurse whom indicated they could use ativan gel and that CM just needs to send script and Munira Felton will [facility] [place]

## 2019-11-12 NOTE — PROGRESS NOTES
11/12/19 1004   Team Meeting   Meeting Type Daily Rounds   Team Members Present   Team Members Present Physician;Nurse;;; Occupational Therapist   Physician Team Member Dr Keren Chen MD; Griselda Gaytan[address]   Nursing Team Member Sylvia Azul, RN   Care Management Team Member Jennifer Iniguez [facility], [facility], [facility]   Social Work Team Member Robert Cardona, [place]   OT Team Member Marzena Munoz [place]   Patient/Family Present   Patient Present No   Patient's Family Present No     Appetite is poor despite encouragement and assistance, slept, responded appropriately this am, cooperative  Will D/C Monae tomorrow, son and  will   Family meeting at noon before

## 2019-11-12 NOTE — PROGRESS NOTES
[id number] Note  Pt resting in chair calm until about dinner time, pt then became very anxious, vocal, and tearful  Pt spit out evening medications, which included trazodone, namenda, and cozaar  Prn ativan gel applied at 1751  Pt then took medications crushed in vanilla pudding in 5-6 tiny bites shortly after  Pt resting in chair calm at this time  Q 7 min checks maintained

## 2019-11-12 NOTE — PLAN OF CARE
Problem: Prexisting or High Potential for Compromised Skin Integrity  Goal: Skin integrity is maintained or improved  Description  INTERVENTIONS:  - Identify patients at risk for skin breakdown  - Assess and monitor skin integrity  - Assess and monitor nutrition and hydration status  - Monitor labs   - Assess for incontinence   - Turn and reposition patient  - Assist with mobility/ambulation  - Relieve pressure over bony prominences  - Avoid friction and shearing  - Provide appropriate hygiene as needed including keeping skin clean and dry  - Evaluate need for skin moisturizer/barrier cream  - Collaborate with interdisciplinary team   - Patient/family teaching  - Consider wound care consult   Outcome: Progressing     Problem: SLEEP DISTURBANCE  Goal: Will exhibit normal sleeping pattern  Description  Interventions:  -  Assess the patients sleep pattern, noting recent changes  - Administer medication as ordered  - Decrease environmental stimuli, including noise, as appropriate during the night  - Encourage the patient to actively participate in unit groups and or exercise during the day to enhance ability to achieve adequate sleep at night  - Assess the patient, in the morning, encouraging a description of sleep experience  Outcome: Progressing     Problem: INVOLUNTARY ADMIT  Goal: Will cooperate with staff recommendations and doctor's orders and will demonstrate appropriate behavior  Description  INTERVENTIONS:  - Treat underlying conditions and offer medication as ordered  - Educate regarding involuntary admission procedures and rules  - Utilize positive consistent limit setting strategies to support patient and staff safety  Outcome: Progressing     Problem: Alteration in Thoughts and Perception  Goal: Agree to be compliant with medication regime, as prescribed and report medication side effects  Description  Interventions:  - Offer appropriate PRN medication and supervise ingestion; conduct AIMS, as needed Outcome: Progressing     Problem: Alteration in Orientation  Goal: Allow medical examinations, as recommended  Description  Interventions:  - Provide physical/neurological exams and/or referrals, per provider   Outcome: Progressing  Goal: Cooperate with recommended testing/procedures  Description  Interventions:  - Determine need for ancillary testing  - Observe for mental status changes  - Implement falls/precaution protocol   Outcome: Progressing     Problem: Nutrition/Hydration-ADULT  Goal: Nutrient/Hydration intake appropriate for improving, restoring or maintaining nutritional needs  Description  Monitor and assess patient's nutrition/hydration status for malnutrition  Collaborate with interdisciplinary team and initiate plan and interventions as ordered  Monitor patient's weight and dietary intake as ordered or per policy  Utilize nutrition screening tool and intervene as necessary  Determine patient's food preferences and provide high-protein, high-caloric foods as appropriate  INTERVENTIONS:  - Monitor oral intake, urinary output, labs, and treatment plans  - Assess nutrition and hydration status and recommend course of action  - Evaluate amount of meals eaten  - Assist patient with eating if necessary   - Allow adequate time for meals  - Recommend/ encourage appropriate diets, oral nutritional supplements, and vitamin/mineral supplements  - Order, calculate, and assess calorie counts as needed  - Recommend, monitor, and adjust tube feedings and TPN/PPN based on assessed needs  - Assess need for intravenous fluids  - Provide specific nutrition/hydration education as appropriate  - Include patient/family/caregiver in decisions related to nutrition      10-30-19    she continues with regular and finger foods with ensure at meals  Her intake ranges from  % with ensure consumed per nursing  Her weight was 129 on 10-22 and then 130 on 10-26 with her BMI 24 58   No new labs RDN to evaluate her labs when available  She is on vitamin B 12 and D  Her skin is intact  RDN observed on rounds and staff stated that she is doing better than she was   RDN to reassess her nutrition needs at moderate risk and follow PRN   Outcome: Progressing

## 2019-11-12 NOTE — PLAN OF CARE
Problem: Ineffective Coping  Goal: Participates in unit activities  Description  Interventions:  - Provide therapeutic environment   - Provide required programming   - Redirect inappropriate behaviors   Outcome: Not Progressing   Patient has been less settled today and unable to tolerate groups due to level of dementia

## 2019-11-12 NOTE — NURSING NOTE
Anxiolytic and decrease in stimuli effective in reducing anxiety  [phone]  Pt found to be sleeping on most of authors rounds  No acute behavioral issues noted  Q 7 min checks maintained  Fall protocol in place

## 2019-11-13 VITALS
HEART RATE: 63 BPM | TEMPERATURE: 98.8 F | WEIGHT: 128.75 LBS | RESPIRATION RATE: 16 BRPM | SYSTOLIC BLOOD PRESSURE: 134 MMHG | DIASTOLIC BLOOD PRESSURE: 65 MMHG | BODY MASS INDEX: 24.31 KG/M2 | HEIGHT: 61 IN | OXYGEN SATURATION: 95 %

## 2019-11-13 PROCEDURE — 99239 HOSP IP/OBS DSCHRG MGMT >30: CPT | Performed by: NURSE PRACTITIONER

## 2019-11-13 RX ORDER — PANTOPRAZOLE SODIUM 40 MG/1
40 TABLET, DELAYED RELEASE ORAL
Status: DISCONTINUED | OUTPATIENT
Start: 2019-11-13 | End: 2019-11-13 | Stop reason: HOSPADM

## 2019-11-13 RX ORDER — PANTOPRAZOLE SODIUM 40 MG/1
40 TABLET, DELAYED RELEASE ORAL
Qty: 30 TABLET | Refills: 0 | Status: SHIPPED | OUTPATIENT
Start: 2019-11-14 | End: 2019-12-14

## 2019-11-13 RX ADMIN — TRAZODONE HYDROCHLORIDE 25 MG: 50 TABLET ORAL at 09:10

## 2019-11-13 RX ADMIN — MEMANTINE 10 MG: 5 TABLET ORAL at 09:09

## 2019-11-13 RX ADMIN — VENLAFAXINE HYDROCHLORIDE 75 MG: 37.5 CAPSULE, EXTENDED RELEASE ORAL at 09:10

## 2019-11-13 RX ADMIN — LOSARTAN POTASSIUM 25 MG: 50 TABLET, FILM COATED ORAL at 09:09

## 2019-11-13 RX ADMIN — ERGOCALCIFEROL 50000 UNITS: 1.25 CAPSULE ORAL at 09:14

## 2019-11-13 RX ADMIN — LEVOTHYROXINE SODIUM 88 MCG: 88 TABLET ORAL at 06:18

## 2019-11-13 RX ADMIN — CLONAZEPAM 0.5 MG: 0.5 TABLET ORAL at 06:18

## 2019-11-13 RX ADMIN — PANTOPRAZOLE SODIUM 40 MG: 40 TABLET, DELAYED RELEASE ORAL at 10:01

## 2019-11-13 RX ADMIN — Medication 20 MG: at 09:10

## 2019-11-13 RX ADMIN — AMANTADINE HYDROCHLORIDE 50 MG: 50 SOLUTION ORAL at 09:10

## 2019-11-13 RX ADMIN — LORAZEPAM 0.5 MG: 0.5 TABLET ORAL at 11:45

## 2019-11-13 NOTE — SOCIAL WORK
SINA Leon, PT , PT daughter, PT brother in law, and CM met for family meeting  Reviewed PT status, calming techniques, medications, and progress made during stay  PT discharged to family care and return to [address] [place] [address]

## 2019-11-13 NOTE — PLAN OF CARE
Problem: Prexisting or High Potential for Compromised Skin Integrity  Goal: Skin integrity is maintained or improved  Description  INTERVENTIONS:  - Identify patients at risk for skin breakdown  - Assess and monitor skin integrity  - Assess and monitor nutrition and hydration status  - Monitor labs   - Assess for incontinence   - Turn and reposition patient  - Assist with mobility/ambulation  - Relieve pressure over bony prominences  - Avoid friction and shearing  - Provide appropriate hygiene as needed including keeping skin clean and dry  - Evaluate need for skin moisturizer/barrier cream  - Collaborate with interdisciplinary team   - Patient/family teaching  - Consider wound care consult   Outcome: Adequate for Discharge     Problem: DEPRESSION  Goal: Will be euthymic at discharge  Description  INTERVENTIONS:  - Administer medication as ordered  - Provide emotional support via 1:1 interaction with staff  - Encourage involvement in milieu/groups/activities  - Monitor for social isolation  Outcome: Adequate for Discharge     Problem: BEHAVIOR  Goal: Pt/Family maintain appropriate behavior and adhere to behavioral management agreement, if implemented  Description  INTERVENTIONS:  - Assess the family dynamic   - Encourage verbalization of thoughts and concerns in a socially appropriate manner  - Assess patient/family's coping skills and non-compliant behavior (including use of illegal substances)  - Utilize positive, consistent limit setting strategies supporting safety of patient, staff and others  - Initiate consult with Case Management, Spiritual Care or other ancillary services as appropriate  - If a patient's/visitor's behavior jeopardizes the safety of the patient, staff, or others, refer to organization procedure     - Notify Security of behavior or suspected illegal substances which indicate the need for search of the patient and/or belongings  - Encourage participation in the decision making process about a behavioral management agreement; implement if patient meets criteria  Outcome: Adequate for Discharge     Problem: ANXIETY  Goal: Will report anxiety at manageable levels  Description  INTERVENTIONS:  - Administer medication as ordered  - Teach and encourage coping skills  - Provide emotional support  - Assess patient/family for anxiety and ability to cope  Outcome: Adequate for Discharge  Goal: By discharge: Patient will verbalize 2 strategies to deal with anxiety  Description  Interventions:  - Identify any obvious source/trigger to anxiety  - Staff will assist patient in applying identified coping technique/skills  - Encourage attendance of scheduled groups and activities  Outcome: Adequate for Discharge     Problem: SLEEP DISTURBANCE  Goal: Will exhibit normal sleeping pattern  Description  Interventions:  -  Assess the patients sleep pattern, noting recent changes  - Administer medication as ordered  - Decrease environmental stimuli, including noise, as appropriate during the night  - Encourage the patient to actively participate in unit groups and or exercise during the day to enhance ability to achieve adequate sleep at night  - Assess the patient, in the morning, encouraging a description of sleep experience  Outcome: Adequate for Discharge     Problem: INVOLUNTARY ADMIT  Goal: Will cooperate with staff recommendations and doctor's orders and will demonstrate appropriate behavior  Description  INTERVENTIONS:  - Treat underlying conditions and offer medication as ordered  - Educate regarding involuntary admission procedures and rules  - Utilize positive consistent limit setting strategies to support patient and staff safety  Outcome: Adequate for Discharge     Problem: Alteration in Thoughts and Perception  Goal: Verbalize thoughts and feelings  Description  Interventions:  - Promote a nonjudgmental and trusting relationship with the patient through active listening and therapeutic communication  - Assess patient's level of functioning, behavior and potential for risk  - Engage patient in 1 on 1 interactions  - Encourage patient to express fears, feelings, frustrations, and discuss symptoms    - [place] patient to reality, help patient recognize reality-based thinking   - Administer medications as ordered and assess for potential side effects  - Provide the patient education related to the signs and symptoms of the illness and desired effects of prescribed medications  Outcome: Adequate for Discharge  Goal: Refrain from acting on delusional thinking/internal stimuli  Description  Interventions:  - Monitor patient closely, per order   - Utilize least restrictive measures   - Set reasonable limits, give positive feedback for acceptable   - Administer medications as ordered and monitor of potential side effects  Outcome: Adequate for Discharge  Goal: Agree to be compliant with medication regime, as prescribed and report medication side effects  Description  Interventions:  - Offer appropriate PRN medication and supervise ingestion; conduct AIMS, as needed   Outcome: Adequate for Discharge  Goal: Complete daily ADLs, including personal hygiene independently, as able  Description  Interventions:  - Observe, teach, and assist patient with ADLS  - Monitor and promote a balance of rest/activity, with adequate nutrition and elimination   Outcome: Adequate for Discharge     Problem: Ineffective Coping  Goal: Demonstrates healthy coping skills  Outcome: Adequate for Discharge     Problem: Alteration in Orientation  Goal: Treatment Goal: Demonstrate a reduction of confusion and improved orientation to person, place, time and/or situation upon discharge, according to optimum baseline/ability  Outcome: Adequate for Discharge  Goal: Interact with staff daily  Description  Interventions:  - Assess and re-assess patient's level of orientation  - Engage patient in 1 on 1 interactions, daily, for a minimum of 15 minutes   - Establish rapport/trust with patient   Outcome: Adequate for Discharge  Goal: Allow medical examinations, as recommended  Description  Interventions:  - Provide physical/neurological exams and/or referrals, per provider   Outcome: Adequate for Discharge  Goal: Cooperate with recommended testing/procedures  Description  Interventions:  - Determine need for ancillary testing  - Observe for mental status changes  - Implement falls/precaution protocol   Outcome: Adequate for Discharge  Goal: Attend and participate in unit activities, including therapeutic, recreational, and educational groups  Description  Interventions:  - Provide therapeutic and educational activities daily, encourage attendance and participation, and document same in the medical record   - Provide appropriate opportunities for reminiscence   - Provide a consistent daily routine   - Encourage family contact/visitation   Outcome: Adequate for Discharge  Goal: Complete daily ADLs, including personal hygiene independently, as able  Description  Interventions:  - Observe, teach, and assist patient with ADLS  Outcome: Adequate for Discharge     Problem: Nutrition/Hydration-ADULT  Goal: Nutrient/Hydration intake appropriate for improving, restoring or maintaining nutritional needs  Description  Monitor and assess patient's nutrition/hydration status for malnutrition  Collaborate with interdisciplinary team and initiate plan and interventions as ordered  Monitor patient's weight and dietary intake as ordered or per policy  Utilize nutrition screening tool and intervene as necessary  Determine patient's food preferences and provide high-protein, high-caloric foods as appropriate       INTERVENTIONS:  - Monitor oral intake, urinary output, labs, and treatment plans  - Assess nutrition and hydration status and recommend course of action  - Evaluate amount of meals eaten  - Assist patient with eating if necessary   - Allow adequate time for meals  - Recommend/ encourage appropriate diets, oral nutritional supplements, and vitamin/mineral supplements  - Order, calculate, and assess calorie counts as needed  - Recommend, monitor, and adjust tube feedings and TPN/PPN based on assessed needs  - Assess need for intravenous fluids  - Provide specific nutrition/hydration education as appropriate  - Include patient/family/caregiver in decisions related to nutrition      10-30-19    she continues with regular and finger foods with ensure at meals  Her intake ranges from  % with ensure consumed per nursing  Her weight was 129 on 10-22 and then 130 on 10-26 with her BMI 24 58  No new labs RDN to evaluate her labs when available  She is on vitamin B 12 and D  Her skin is intact  RDN observed on rounds and staff stated that she is doing better than she was   RDN to reassess her nutrition needs at moderate risk and follow PRN   Outcome: Adequate for Discharge

## 2019-11-13 NOTE — BH TRANSITION RECORD
Contact Information: If you have any questions, concerns, pended studies, tests and/or procedures, or emergencies regarding your inpatient behavioral health visit  Please contact [facility] [phone] and ask to speak to a , nurse or physician  A contact is available 24 hours/ 7 days a week at this number  Summary of Procedures Performed During your Stay:  Below is a list of major procedures performed during your hospital stay and a summary of results:  - No major procedures performed  Pending Studies (From admission, onward)    None        If studies are pending at discharge, follow up with your PCP and/or referring provider

## 2019-11-13 NOTE — NURSING NOTE
The patient discharged from unit at 1300 via wheelchair accompanied by , daughter, brother-in-law, Walt Del Angel, and   Personal belongings returned to patient at time of discharge  Discharge instructions and prescriptions given to family to take to the facility

## 2019-11-13 NOTE — PROGRESS NOTES
Patient was resting comfortably then woke after HS vitals were being performed  She became anxious and tearful, spitting intermittently  Patient was able to be consoled verbally  She took her HS medications, crushed in pudding with minimal coaxing  Staff assisted patient to bed where she has been sleeping without incident  Bed is in the lowest position with side rails up x3  Bed alarm in place for patient safety  Will CTM via q7 minute safety checks

## 2019-11-13 NOTE — PROGRESS NOTES
11/13/19 0951   Team Meeting   Meeting Type Daily Rounds   Patient/Family Present   Patient Present No   Patient's Family Present No     Daily Psychiatric Rounds    Team Members Present:    Augustine Swanson, SINA Sharifs, W  Tiffany Ferrer, MS,NCC,LPC  Coralyn Oppenheim, CTRS  Maximus Hardin, MARIOLA Aiken, RN    Discussion:     Appetite poor at times  Sleeps all night  Discharge planned for today at noon back to [facility]

## 2019-11-13 NOTE — PROGRESS NOTES
Patient was able to remain in bed sleeping throughout the entire overnight hours  No restlessness observed  No outbursts or acute behaviors observed during the overnight  No PRN medications needed  Bed remains in lowest position with side rails up x3  Bed alarm in place for patient safety  Will CTM via q7 minute safety checks

## 2019-11-13 NOTE — DISCHARGE SUMMARY
Discharge Summary - Terrell Amado 68 y o  female MRN: [id number]  Unit/Bed#: [facility] 803-77 Encounter: [id number]     Admission Date: 10/22/2019         Discharge Date: 11/13/2019    Attending Psychiatrist: Danni Huang MD    Reason for Admission/HPI: Altered mental status [R41 82]  MDD (major depressive disorder), single episode, severe with psychotic features ([address] ) [F32 3]  Encounter for behavioral health screening [Z13 30]    According to admission report from Dr Aleah Zhang:    Blaise Manjarrez is a 68 y o  female with a history of depression and cognitive disorder who was admitted to the inpatient adult psychiatric unit on a involuntary 302 commitment basis due to unstable mood, visual hallucinations and disorganized behavior  Patient presented with her  and son to ED because of change of mental status and worsening of agitation   reported patient has been crying hysterically and making statements about wanting to die and kill herself prior to her SNF admission last month  Patient was seen by a psychiatrist at the SNF and was  started on Haldol which seemed to have calmed the hysterical screaming, paranoia and crying spells but has caused several other adverse reaction  Patient is now flat affect and emotionless and nearly non-verbal   and son stated that at baseline 1 month ago, patient was verbal (although some time what she said made no sense), patient was bright and was able to walk unassisted and carrying on conversation at most times  Patient also experiences decrease in appetite and has has lost about 4-5 lb in the past 2 weeks  On evaluation in the inpatient psychiatric unit Frankey Dross is noted to be visibly on the unit, continually pacing with assistant  She is noted to lean to the right side with ambulation and her pace of gait pretty quick  Patient is minimally interactive with others and noted to be quiet, internally preoccupied and stating "I am scared"   Patient is also noted to have possible visual hallucination as she is seen picking up things in the air while pacing in the unit  Patient is unable to provide any meaningful information due to advanced stage of cognitive function decline  As her , patient began to exhibit cognitive and behavioral changes over 10 years ago  Initial changes were pt not able to perform her usual tasks, gradual speech impairment and mood changes  Patient was 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  Patient also has a PET CT of the brain done in 2015 which showed suspicion for Lewy body neurocognitive disorder  Hospital Course: The patient was admitted to the inpatient psychiatric unit and started on every 7 minutes precautions  During the hospitalization the patient was attending individual therapy, group therapy, milieu therapy and occupational therapy  Psychiatric medications were titrated over the hospital stay  Haldol was discontinued due to symptoms of parkinsonism, and started on amantadine  To address mood instability, mood swings, impulsivity, paranoid ideation, delusional thoughts, confusion and cognitive difficulties the patient was started on antidepressant Effexor XR and Trazodone, anxiolytic medication Klonopin and cognitve enhancer Namenda  Medication doses were titrated during the hospital course  Prior to beginning of treatment medications risks and benefits and possible side effects including risk of suicidality and serotonin syndrome related to treatment with antidepressants and risks of cardiovascular side effects including elevated blood pressure, risk of misuse, abuse or dependence and risk of increased anxiety related to treatment with stimulant medications were reviewed with the patient  The patient verbalized understanding and agreement for treatment  Patient's symptoms improved gradually over the hospital course   At the end of treatment the patient was doing well  Mood was stable at the time of discharge  The patient denied suicidal ideation, intent or plan at the time of discharge and denied homicidal ideation, intent or plan at the time of discharge  There was no overt psychosis at the time of discharge  Sleep and appetite were improved  The patient was tolerating medications and was not reporting any significant side effects at the time of discharge  Since the patient was doing well at the end of the hospitalization, treatment team felt that the patient could be safely discharged to outpatient care  The outpatient follow up with a psychiatrist was arranged by the unit  upon discharge  Mental Status at time of Discharge:     Appearance:  casually dressed   Behavior:  Anxious/apprehensive   Speech:  aphasia   Mood:  anxious   Affect:  blunted   Thought Process:  concrete and disorganized   Thought Content:  paranoia   Perceptual Disturbances: unable to assess   Risk Potential: Suicidal Ideations none, Homicidal Ideations none and Potential for Aggression No   Sensorium:  person   Cognition:  impaired due to dementia   Consciousness:  alert and awake    Attention: attention span appeared shorter than expected for age   Insight:  limited   Judgment: limited   Gait/Station: slow   Motor Activity: no abnormal movements     Admission Diagnosis:Altered mental status [R41 82]  MDD (major depressive disorder), single episode, severe with psychotic features ([address] ) [F32 3]  Encounter for behavioral health screening [Z13 30]    Discharge Diagnosis:   Principal Problem:    Major depressive disorder, recurrent, severe with psychotic features ([address] )  Active Problems:    Major neurocognitive disorder, due to multiple etiologies, with behavioral disturbance, severe ([address] )  Resolved Problems:    * No resolved hospital problems   *        Lab results:  Admission on 10/22/2019   Component Date Value    WBC 10/22/2019 5 90     RBC 10/22/2019 4 15     Hemoglobin 10/22/2019 13 6     Hematocrit 10/22/2019 40 4*    MCV 10/22/2019 97     MCH 10/22/2019 32 7     MCHC 10/22/2019 33 6     RDW 10/22/2019 12 7     MPV 10/22/2019 9 2     Platelets 29/52/0882 202     Neutrophils Relative 10/22/2019 74     Lymphocytes Relative 10/22/2019 18*    Monocytes Relative 10/22/2019 7     Eosinophils Relative 10/22/2019 0     Basophils Relative 10/22/2019 1     Neutrophils Absolute 10/22/2019 4 30     Lymphocytes Absolute 10/22/2019 1 10     Monocytes Absolute 10/22/2019 0 40     Eosinophils Absolute 10/22/2019 0 00     Basophils Absolute 10/22/2019 0 10     Sodium 10/22/2019 140     Potassium 10/22/2019 3 6     Chloride 10/22/2019 106     CO2 10/22/2019 28     ANION GAP 10/22/2019 6     BUN 10/22/2019 15     Creatinine 10/22/2019 0 73     Glucose 10/22/2019 110*    Calcium 10/22/2019 10 0     eGFR 10/22/2019 83     Color, UA 10/22/2019 Yellow     Clarity, UA 10/22/2019 Clear     Specific Gravity, UA 10/22/2019 >=1 030*    pH, UA 10/22/2019 5 5     Leukocytes, UA 10/22/2019 Negative     Nitrite, UA 10/22/2019 Negative     Protein, UA 10/22/2019 Negative     Glucose, UA 10/22/2019 Negative     Ketones, UA 10/22/2019 Negative     Urobilinogen, UA 10/22/2019 1 0     Bilirubin, UA 10/22/2019 Negative     Blood, UA 10/22/2019 Negative     Amph/Meth UR 10/22/2019 Negative     Barbiturate Ur 10/22/2019 Negative     Benzodiazepine Urine 10/22/2019 Negative     Cocaine Urine 10/22/2019 Negative     Methadone Urine 10/22/2019 Negative     Opiate Urine 10/22/2019 Negative     PCP Ur 10/22/2019 Negative     THC Urine 10/22/2019 Positive*    Ethanol Lvl 10/22/2019 <10     TSH 3RD GENERATON 10/22/2019 3 320     Vitamin B-12 10/22/2019 293     Vit D, 25-Hydroxy 10/22/2019 19 9*    Folate 10/22/2019 12 0     Prealbumin 10/22/2019 18 4     Ventricular Rate 10/22/2019 65     Atrial Rate 10/22/2019 65     NC Interval 10/22/2019 164     QRSD Interval 10/22/2019 80     QT Interval 10/22/2019 404     QTC Interval 10/22/2019 420     P Axis 10/22/2019 52     QRS Axis 10/22/2019 -22     T Wave Axis 10/22/2019 19     Clarity, UA 11/04/2019 Cloudy*    Color, UA 11/04/2019 Yellow     Specific Gravity, UA 11/04/2019 1 015     pH, UA 11/04/2019 7 5     Glucose, UA 11/04/2019 Negative     Ketones, UA 11/04/2019 15 (1+)*    Blood, UA 11/04/2019 Negative     Protein, UA 11/04/2019 Negative     Nitrite, UA 11/04/2019 Negative     Bilirubin, UA 11/04/2019 Negative     Urobilinogen, UA 11/04/2019 0 2     Leukocytes, UA 11/04/2019 Negative     WBC, UA 11/04/2019 0-1*    RBC, UA 11/04/2019 1-2*    Bacteria, UA 11/04/2019 Occasional     Epithelial Cells 11/04/2019 Occasional     Urine Culture 11/04/2019 <10,000 cfu/ml      Sodium 11/03/2019 141     Potassium 11/03/2019 3 7     Chloride 11/03/2019 104     CO2 11/03/2019 28     ANION GAP 11/03/2019 9     BUN 11/03/2019 25     Creatinine 11/03/2019 0 81     Glucose 11/03/2019 116*    Calcium 11/03/2019 10 3     AST 11/03/2019 14     ALT 11/03/2019 12     Alkaline Phosphatase 11/03/2019 62     Total Protein 11/03/2019 6 5     Albumin 11/03/2019 4 2     Total Bilirubin 11/03/2019 0 40     eGFR 11/03/2019 73     WBC 11/03/2019 7 40     RBC 11/03/2019 4 16     Hemoglobin 11/03/2019 13 2     Hematocrit 11/03/2019 41 1*    MCV 11/03/2019 99     MCH 11/03/2019 31 8     MCHC 11/03/2019 32 2     RDW 11/03/2019 13 1     MPV 11/03/2019 9 0     Platelets 16/84/8006 147*    Neutrophils Relative 11/03/2019 75     Lymphocytes Relative 11/03/2019 16*    Monocytes Relative 11/03/2019 6     Eosinophils Relative 11/03/2019 2     Basophils Relative 11/03/2019 1     Neutrophils Absolute 11/03/2019 5 60     Lymphocytes Absolute 11/03/2019 1 20     Monocytes Absolute 11/03/2019 0 40     Eosinophils Absolute 11/03/2019 0 20     Basophils Absolute 11/03/2019 0 00        Discharge Medications:  Current Discharge Medication List      START taking these medications    Details   amantadine (SYMMETREL) 100 mg capsule Take 1 capsule (100 mg total) by mouth daily at bedtime  Qty: 30 capsule, Refills: 0    Associated Diagnoses: Major depressive disorder, recurrent, severe with psychotic features (HCC)      amantadine (SYMMETREL) 50 mg/5 mL solution Take 5 mL (50 mg total) by mouth every morning  Qty: 140 mL, Refills: 0    Associated Diagnoses: Major depressive disorder, recurrent, severe with psychotic features (HCC)      cholecalciferol (VITAMIN D3) 1,000 units tablet Take 1 tablet (1,000 Units total) by mouth daily Start after finishing Vitamin D2 24997P  Qty: 30 tablet, Refills: 1    Associated Diagnoses: Vitamin D deficiency      clonazePAM (KlonoPIN) 0 5 mg tablet Take 1 tablet (0 5 mg total) by mouth 2 (two) times a day Take at 6 am and 8 pm  Qty: 60 tablet, Refills: 0    Associated Diagnoses: Major depressive disorder, recurrent, severe with psychotic features (HCC)      cyanocobalamin 1,000 mcg/mL Inject 1 mL (1,000 mcg total) into a muscle every 30 (thirty) days  Qty: 1 mL, Refills: 1    Associated Diagnoses: B12 deficiency      ergocalciferol (VITAMIN D2) 50,000 units Take 1 capsule (50,000 Units total) by mouth once a week for 10 doses  Qty: 10 capsule, Refills: 0    Associated Diagnoses: Vitamin D deficiency      losartan (COZAAR) 25 mg tablet Take 1 tablet (25 mg total) by mouth 2 (two) times a day  Qty: 60 tablet, Refills: 1    Associated Diagnoses: Hypertension      traZODone (DESYREL) 50 mg tablet Take 0 5 tablets (25 mg total) by mouth 3 (three) times a day  Qty: 45 tablet, Refills: 0    Associated Diagnoses: Major depressive disorder, recurrent, severe with psychotic features (HCC)      venlafaxine (EFFEXOR-XR) 75 mg 24 hr capsule Take 1 capsule (75 mg total) by mouth daily  Qty: 30 capsule, Refills: 0    Associated Diagnoses: Major depressive disorder, recurrent, severe with psychotic features ([facility] [address] )            Current Discharge Medication List      STOP taking these medications       citalopram (CeleXA) 20 mg tablet Comments:   Reason for Stopping:         haloperidol (HALDOL) 1 mg tablet Comments:   Reason for Stopping:         nitrofurantoin (MACRODANTIN) 100 mg capsule Comments:   Reason for Stopping:         valsartan (DIOVAN) 80 mg tablet Comments:   Reason for Stopping:         LORazepam (ATIVAN) 0 5 mg tablet Comments:   Reason for Stopping:              Current Discharge Medication List      CONTINUE these medications which have CHANGED    Details   levothyroxine 88 mcg tablet Take 1 tablet (88 mcg total) by mouth daily in the early morning  Qty: 30 tablet, Refills: 1    Associated Diagnoses: Hypothyroidism      memantine (NAMENDA) 10 mg tablet Take 1 tablet (10 mg total) by mouth 2 (two) times a day  Qty: 60 tablet, Refills: 1    Associated Diagnoses: Late onset Alzheimer's disease without behavioral disturbance (HCC)      pantoprazole (PROTONIX) 20 mg tablet Take 2 tablets (40 mg total) by mouth daily  Qty: 30 tablet, Refills: 1    Associated Diagnoses: Gastroesophageal reflux disease without esophagitis            Current Discharge Medication List           Discharge instructions/Information to patient and family:   See after visit summary for information provided to patient and family  Provisions for Follow-Up Care:  See after visit summary for information related to follow-up care and any pertinent home health orders  Discharge Statement     I spent 35 minutes discharging the patient  This time was spent on the day of discharge  I had direct contact with the patient on the day of discharge  Additional documentation is required if more than 30 minutes were spent on discharge:    I reviewed with  importance of compliance with medications and outpatient treatment after discharge    I discussed the medication regimen and possible side effects of the medications  prior to discharge  At the time of discharge she was tolerating psychiatric medications  I discussed outpatient follow up with   I reviewed with  crisis plan and safety plan upon discharge

## 2019-11-13 NOTE — PROGRESS NOTES
Pt discharged with the following:    Mint green sweater  2 pairs of jeans  Black pants  Turquoise pants  Navy long sleeve top  Maroon long sleeve top  Blue and white stripe top  [employer] nightgown with hearts  Brush  Yellow colored ring  Gray sneakers with laces  Navy jacket  Black under wire bra  Alarm ankle bracelet  White socks  Animal print suitcase filled with clothes  Grey bra  Black bra

## 2019-11-13 NOTE — PROGRESS NOTES
Patient noted to be increasingly anxious, restless and crying at 1130  Clinical staff provided support, redirection, offered fluids and nutrition and assisted patient with ambulation  Provided techniques ineffective  Butts anxiety scale score as needed ativan 0 5 mg administered at 1145 as per physician's orders  Clinical staff provided continual support, redirection, and assistance with mobility  The patient's anxiety noted to be slightly less at time of reassessment at [phone]

## 2019-11-13 NOTE — PROGRESS NOTES
Reported to RN at 4336 that patient expectorated small amount of sputum and noted to be pink-tinged  RN Viviana Mondragon, placed call to Dr Petra Bush to provide notification of the same  New order noted to change Prilosec to Protonix  MHT, Desire, reported to RN that patient got out of bed without difficulty, noted to be calm and pleasant, but as patient began ambulating down hallway, noted to become increasingly anxious, tearful and crying loudly  The patient refused to eat breakfast meal at that time  The patient assisted into recliner chair and assisted by clinical staff with chair mobility in the hallway, intervention noted to be effective, the patient noted to be quiet and calm at this time  Will continue to monitor

## 2019-11-13 NOTE — DISCHARGE INSTRUCTIONS
Trazodone (By mouth)   Trazodone (TRAZ-oh-done)  Treats depression  Brand Name(s): Rosemariero   There may be other brand names for this medicine  When This Medicine Should Not Be Used: This medicine is not right for everyone  Do not use it if you had an allergic reaction to trazodone  How to Use This Medicine:   Tablet, Long Acting Tablet  · Take your medicine as directed  Your dose may need to be changed several times to find what works best for you  · Regular tablet: Take it with or shortly after a meal or light snack  · Extended-release tablet: Take it at the same time each day, preferably at bedtime, without food  · The tablet can be swallowed whole, or you may break the tablet in half along the score line  Do not break the tablet unless your doctor tells you to  Do not crush or chew the tablet  · This medicine should come with a Medication Guide  Ask your pharmacist for a copy if you do not have one  · Missed dose: Take a dose as soon as you remember  If it is almost time for your next dose, wait until then and take a regular dose  Do not take extra medicine to make up for a missed dose  · Store the medicine in a closed container at room temperature, away from heat, moisture, and direct light  Drugs and Foods to Avoid:   Ask your doctor or pharmacist before using any other medicine, including over-the-counter medicines, vitamins, and herbal products  · Do not use trazodone if you currently take an MAO inhibitor (MAOI) or have used an MAOI in the past 14 days    · Tell your doctor if you also use any of the following:  ¨ Carbamazepine, digoxin, phenytoin, indinavir, ritonavir, buspirone, fentanyl, lithium, tryptophan, Maggie's wort, tramadol  ¨ Medicine to treat a fungal infection (such as itraconazole, ketoconazole), a diuretic (water pill), blood pressure medicine, an NSAID pain or arthritis medicine (such as aspirin, celecoxib, diclofenac, ibuprofen, naproxen), a blood thinner (such as warfarin), other medicine for depression, or triptan medicine to treat migraine headaches  · Do not drink alcohol while you are using this medicine  · Tell your doctor if you use anything else that makes you sleepy  Some examples are allergy medicine, narcotic pain medicine, and alcohol  Warnings While Using This Medicine:   · Tell your doctor if you are pregnant or breastfeeding, or if you have kidney disease, liver disease, bleeding problems, glaucoma, heart disease, heart rhythm problems, or low blood pressure  Tell your doctor if you recently had a heart attack  · For some children, teenagers, and young adults, this medicine may increase mental or emotional problems  This may lead to thoughts of suicide and violence  Talk with your doctor right away if you have any thoughts or behavior changes that concern you  Tell your doctor if you or anyone in your family has a history of bipolar disorder or suicide attempts  · This medicine may cause the following problems:  ¨ Serotonin syndrome (more likely when used with certain other medicines)  ¨ Heart rhythm problems (QT prolongation)  ¨ Low sodium levels  ¨ Higher risk of bleeding  · Do not stop using this medicine suddenly  Your doctor will need to slowly decrease your dose before you stop it completely  · This medicine may make you dizzy or drowsy  Do not drive or do anything that could be dangerous until you know how this medicine affects you  Stand or sit up slowly if you are dizzy  · Tell any doctor or dentist who treats you that you are using this medicine  You may need to stop using this medicine several days before you have surgery or medical tests  · Your doctor will check your progress and the effects of this medicine at regular visits  Keep all appointments  · Keep all medicine out of the reach of children  Never share your medicine with anyone    Possible Side Effects While Using This Medicine:   Call your doctor right away if you notice any of these side effects:  · Allergic reaction: Itching or hives, swelling in your face or hands, swelling or tingling in your mouth or throat, chest tightness, trouble breathing  · Anxiety, restlessness, fever, sweating, muscle spasms, nausea, vomiting, diarrhea, seeing or hearing things that are not there  · Confusion, weakness, muscle twitching  · Fast, pounding, or uneven heartbeat  · Lightheadedness, dizziness, fainting  · Painful, prolonged erection of your penis  · Sudden increase in energy, feeling irritable, trouble sleeping  · Thoughts of hurting yourself or others, unusual behavior  · Unusual bleeding or bruising  If you notice these less serious side effects, talk with your doctor:   · Constipation, mild nausea  · Dry mouth  · Eye pain, vision changes, seeing halos around lights  · Headache  · Sleepiness or unusual drowsiness  If you notice other side effects that you think are caused by this medicine, tell your doctor  Call your doctor for medical advice about side effects  You may report side effects to FDA at 4-409-FDA-3934  © 2017 [address]  Information is for End User's use only and may not be sold, redistributed or otherwise used for commercial purposes  The above information is an  only  It is not intended as medical advice for individual conditions or treatments  Talk to your doctor, nurse or pharmacist before following any medical regimen to see if it is safe and effective for you  Venlafaxine (By mouth)   Venlafaxine ([id number])  Treats depression, generalized anxiety disorder, panic disorder, and social anxiety disorder  Brand Name(s): Effjen MACKAY   There may be other brand names for this medicine  When This Medicine Should Not Be Used: This medicine is not right for everyone  Do not use it if you had an allergic reaction to venlafaxine or desvenlafaxine succinate    How to Use This Medicine:   Long Acting Capsule, Tablet, Long Acting Tablet  · Take your medicine as directed  Your dose may need to be changed several times to find what works best for you  · It is best to take the extended-release capsule at the same time each day (either in the morning or evening)  · It is best to take this medicine with food or milk  · Swallow the extended-release capsule whole  Do not crush, break, or chew it  Do not place the capsule in a liquid  · If you cannot swallow the extended-release capsule, you may open it and pour the medicine into a small amount of soft food such as pudding, yogurt, or applesauce  Stir this mixture well and swallow it without chewing  · This medicine should come with a Medication Guide  Ask your pharmacist for a copy if you do not have one  · Missed dose: Take a dose as soon as you remember  If it is almost time for your next dose, wait until then and take a regular dose  Do not take extra medicine to make up for a missed dose  · Store the medicine in a closed container at room temperature, away from heat, moisture, and direct light  Drugs and Foods to Avoid:   Ask your doctor or pharmacist before using any other medicine, including over-the-counter medicines, vitamins, and herbal products  · Do not use this medicine if you have used an MAO inhibitor within the past 14 days  Do not take an MAO inhibitor for at least 7 days after you stop this medicine  · Some medicines can affect how venlafaxine works   Tell your doctor if you are using any of the following:   ¨ Buspirone, cimetidine, fentanyl, ketoconazole, lithium, metoprolol, mirtazapine, Maggie's wort, tramadol, or tryptophan supplements  ¨ Amphetamines  ¨ Blood thinner (including warfarin)  ¨ Diuretic (water pill)  ¨ Medicine for migraine headaches  ¨ Medicine to lose weight (including phentermine)  ¨ NSAID pain or arthritis medicine (including aspirin, celecoxib, diclofenac, ibuprofen, naproxen)  ¨ Tricyclic antidepressant  · Do not drink alcohol while you are using this medicine  · Tell your doctor if you use anything else that makes you sleepy  Some examples are allergy medicine, narcotic pain medicine, and alcohol  Warnings While Using This Medicine:   · Tell your doctor if you are pregnant or breastfeeding, or if you have kidney disease, liver disease, glaucoma, heart disease, high blood pressure, or thyroid problems  Tell your doctor if you have a history of sesar, seizures, heart attack, or stroke  · This medicine can increase thoughts of suicide  Tell your doctor right away if you start to feel depressed and have thoughts about hurting yourself  · This medicine may cause the following problems:   ¨ Serotonin syndrome (when used with certain medicines)  ¨ Increased cholesterol levels  ¨ Increased blood pressure  ¨ Increased risk of bleeding problems  ¨ Low sodium levels  ¨ Interstitial lung disease and eosinophilic pneumonia  · This medicine may make you dizzy or drowsy  Do not drive or do anything that could be dangerous until you know how this medicine affects you  · Do not stop using this medicine suddenly  Your doctor will need to slowly decrease your dose before you stop it completely  · Tell any doctor or dentist who treats you that you are using this medicine  This medicine may affect certain medical test results  · Your doctor will do lab tests at regular visits to check on the effects of this medicine  Keep all appointments  · Keep all medicine out of the reach of children  Never share your medicine with anyone    Possible Side Effects While Using This Medicine:   Call your doctor right away if you notice any of these side effects:  · Allergic reaction: Itching or hives, swelling in your face or hands, swelling or tingling in your mouth or throat, chest tightness, trouble breathing  · Anxiety, restlessness, fever, sweating, muscle spasms, nausea, vomiting, diarrhea, seeing or hearing things that are not there  · Blistering, peeling, red skin rash  · Chest pain, cough, trouble breathing  · Confusion, weakness, and muscle twitching  · Eye pain, vision changes, seeing halos around lights  · Fast or pounding heartbeat  · Feeling more excited or energetic than usual  · Headache, trouble concentrating, memory problems, unsteadiness  · Seizures  · Unusual behavior, thoughts of hurting yourself or others, trouble sleeping, nervousness, unusual dreams  · Unusual bleeding or bruising  If you notice these less serious side effects, talk with your doctor:   · Dry mouth  · Mild nausea, constipation, vomiting, loss of appetite, weight loss  · Sexual problems  · Sleepiness or unusual drowsiness, dizziness  If you notice other side effects that you think are caused by this medicine, tell your doctor  Call your doctor for medical advice about side effects  You may report side effects to FDA at 1-294-FDA-4619  © 2017 [address] [employer] Information is for End User's use only and may not be sold, redistributed or otherwise used for commercial purposes  The above information is an  only  It is not intended as medical advice for individual conditions or treatments  Talk to your doctor, nurse or pharmacist before following any medical regimen to see if it is safe and effective for you  Amantadine (By mouth)   Amantadine ([id number]-[id number])  Treats or prevents the flu (influenza type A)  Also treats Parkinson's disease and parkinson-like symptoms caused by certain medicines  Brand Name(s):   There may be other brand names for this medicine  When This Medicine Should Not Be Used: You should not use this medicine if you have had an allergic reaction to amantadine  How to Use This Medicine:   Liquid Filled Capsule, Capsule, Liquid, Tablet  · Take your medicine as directed  Your dose may need to be changed several times to find what works best for you  · Measure the oral liquid medicine with a marked measuring spoon, oral syringe, or medicine cup    · If you are using this medicine for the flu (influenza type A), keep using it for the full treatment time  If a dose is missed:   · Take a dose as soon as you remember  If it is almost time for your next dose, wait until then and take a regular dose  Do not take extra medicine to make up for a missed dose  How to Store and Dispose of This Medicine:   · Store the medicine in a closed container at room temperature, away from heat, moisture, and direct light  · Ask your pharmacist, doctor, or health caregiver about the best way to dispose of any outdated medicine or medicine no longer needed  · Keep all medicine out of the reach of children  Never share your medicine with anyone  Drugs and Foods to Avoid:   Ask your doctor or pharmacist before using any other medicine, including over-the-counter medicines, vitamins, and herbal products  · Make sure your doctor knows if you are taking thioridazine (Mellaril®), triamterene/HCTZ (Denstacey Birks), sulfamethoxazole/trimethoprim (Bactrim®, Septra®), quinine or quinidine (Salvador Salcedo), diet pills, or medicine to treat stomach disorders, urinary problems (incontinence), diarrhea, or mental disorders  · The nasal flu vaccine (live attenuated influenza vaccine, Flumist®) should not be given in the 2 weeks before or 48 hours after taking this medicine  · Do not drink alcohol while you are using this medicine  Warnings While Using This Medicine:   · Make sure your doctor knows if you are pregnant or breastfeeding, or if you have narrow-angle glaucoma, heart disease, kidney disease, liver disease, low blood pressure, swelling or water retention, epilepsy, or a history of seizures  Tell your doctor if you also have a history of eczema or neuroleptic malignant syndrome (NMS)  · Do not stop using this medicine suddenly  Your doctor will need to slowly decrease your dose before you stop it completely  · This medicine may make you dizzy or drowsy   Avoid driving, using machines, or doing anything else that could be dangerous if you are not alert  · If you are using this medicine for Parkinson's disease, it is important that your doctor check your skin regularly for signs of a skin cancer called melanoma  If you notice any unusual red, brown, or black spots on your skin, talk to your doctor right away  · Some people who have used this medicine had unusual changes in their behavior  Talk with your doctor right away if you start having problems with gambling or an increased interest in sex while using this medicine  · Your doctor will check your progress and the effects of this medicine at regular visits  Keep all appointments  · Call your doctor if your symptoms do not improve or if they get worse  Possible Side Effects While Using This Medicine:   Call your doctor right away if you notice any of these side effects:  · Allergic reaction: Itching or hives, swelling in your face or hands, swelling or tingling in your mouth or throat, chest tightness, trouble breathing  · Change in how much or how often you urinate  · Chills, cough, sore throat, and body aches  · Fever, fast heartbeat, mood changes, uncontrolled muscle movements, or rapid breathing  · Lightheadedness, dizziness, or fainting  · Problems with urination  · Purplish red, net-like, blotchy spots on the skin  · Problems with balance or walking  · Seizures  · Slow or irregular heartbeat  · Shortness of breath, cold sweats, and bluish-colored skin  · Swelling in the hands, ankles, or feet  · Unusual thoughts or behavior, thoughts of hurting yourself  If you notice these less serious side effects, talk with your doctor:   · Anxious, confused, or irritable feeling  · Blurred vision  · Dry mouth  · Nausea, vomiting, constipation, or loss of appetite  · Problems with sex  · Skin rash  · Sleepiness or unusual drowsiness  · Tiredness  · Trouble sleeping  · Unusual dreams    If you notice other side effects that you think are caused by this medicine, tell your doctor  Call your doctor for medical advice about side effects  You may report side effects to FDA at 4-557-FDA-2759  © 2017 [address] [employer] Information is for End User's use only and may not be sold, redistributed or otherwise used for commercial purposes  The above information is an  only  It is not intended as medical advice for individual conditions or treatments  Talk to your doctor, nurse or pharmacist before following any medical regimen to see if it is safe and effective for you  Depression   WHAT YOU NEED TO KNOW:   What is depression? Depression is a medical condition that causes feelings of sadness or hopelessness that do not go away  Depression may cause you to lose interest in things you used to enjoy  These feelings may interfere with your daily life  What causes or increases my risk for depression? Depression may be caused by changes in brain chemicals that affect your mood  Your risk for depression may be higher if you have any of the following:  · Stressful events such as the death of a loved one, unemployment, childhood trauma, divorce, or domestic abuse    · A chronic medical condition such as diabetes, heart disease, or cancer    · Parents, siblings, or other family members with a history of depression    · Drug or alcohol abuse  What are the signs and symptoms of depression? · Appetite changes, or weight gain or loss    · Trouble going to sleep or staying asleep, or sleeping too much    · Fatigue or lack of energy    · Feeling restless, irritable, or withdrawn    · Feeling worthless, hopeless, discouraged, or guilty    · Trouble concentrating, remembering things, doing daily tasks, or making decisions    · Thoughts about hurting or killing yourself  How is depression diagnosed? Your healthcare provider will ask about your symptoms and how long you have had them   He or she will ask if you have any family members with depression  Tell your healthcare provider about any stressful events in your life  He or she may ask about any other health conditions or medicines you take  How is depression treated? · Therapy  may be used to treat your depression  A therapist will help you learn to cope with your thoughts and feelings  This can be done alone or in a group  It may also be done with family members or a significant other  · Antidepressant medicine  may be given to improve or balance your mood  You may need to take this medicine for several weeks before you begin to feel better  Tell your healthcare provider about any side effects or problems you have with your medicine  The type or amount of medicine may need to be changed  How can I manage depression? · Get regular physical activity  Try to exercise for 30 minutes, 3 to 5 days a week  Work with your healthcare provider to develop an exercise plan that you enjoy  Physical activity may improve your symptoms  · Get enough sleep  Create a routine to help you relax before bed  You can listen to music, read, or do yoga  Try to go to bed and wake up at the same time every day  Sleep is important for emotional health  · Eat a variety of healthy foods from all of the food groups  A healthy meal plan is low in fat, salt, and added sugar  Ask your healthcare provider for more information about a meal plan that is right for you  · Do not drink alcohol or use drugs  Alcohol and drugs can make your symptoms worse  Call 911 for any of the following:   · You think about harming yourself or someone else  When should I contact my healthcare provider? · Your symptoms do not improve  · You cannot make it to your next appointment  · You have new symptoms  · You have questions or concerns about your condition or care  CARE AGREEMENT:   You have the right to help plan your care  Learn about your health condition and how it may be treated   Discuss treatment options with your caregivers to decide what care you want to receive  You always have the right to refuse treatment  The above information is an  only  It is not intended as medical advice for individual conditions or treatments  Talk to your doctor, nurse or pharmacist before following any medical regimen to see if it is safe and effective for you  © 2017 [address] [employer] Information is for End User's use only and may not be sold, redistributed or otherwise used for commercial purposes  All illustrations and images included in CareNotes® are the copyrighted property of [employer]  or Milton Armstrong  Metabolic Syndrome X   WHAT YOU NEED TO KNOW:   What is metabolic syndrome? Metabolic syndrome is a group of medical conditions that can increase your risk for heart disease, stroke, or type 2 diabetes  You may have metabolic syndrome if you have at least 3 of the following medical conditions:  · High triglycerides (a type of fat in your blood)    · Low HDL cholesterol (good cholesterol)    · High blood pressure    · High blood sugar levels    · Extra abdominal fat  What increases my risk for metabolic syndrome? The exact cause of metabolic syndrome is not known  Your risk for metabolic syndrome increases if you have insulin resistance  Insulin is a hormone that helps your body take sugar out of your blood and use it for energy  Insulin resistance means your pancreas keeps making insulin but your body cannot use it correctly  Your risk for metabolic syndrome also increases as you age, if you are overweight or obese, or you do not exercise  What are the signs and symptoms of metabolic syndrome? Most people with metabolic syndrome do not have any signs or symptoms  You may have more thirst or hunger than usual, urinate more often, or have blurred vision or headaches  How is metabolic syndrome diagnosed?   Your healthcare provider will examine you and ask about other medical conditions you may have  He may ask if you have any family members with metabolic syndrome, diabetes, obesity, or heart disease  · Blood tests: These are used to check your glucose and cholesterol levels  · Oral glucose tolerance test:  Your blood sugar level is tested after you fast for 8 hours, then again after you are given a glucose drink  The test measures how high your blood sugar level rises from the glucose drink  How is metabolic syndrome treated? · Cholesterol medicine: This type of medicine is given to help decrease (lower) the amount of cholesterol (fat) in your blood  Cholesterol medicine works best if you also exercise and eat a healthy diet that is low in certain kinds of fats  Some cholesterol medicines may cause liver problems  You may need to have blood taken for tests while using this medicine  · Blood pressure medicine: This is given to lower your blood pressure  A controlled blood pressure helps protect your organs, such as your heart, lungs, brain, and kidneys  Take your blood pressure medicine exactly as directed  · Hypoglycemic medicine: This medicine may be given to decrease the amount of sugar in your blood  Hypoglycemic medicine helps your body move the sugar to your cells, where it is needed for energy  What are the risks of metabolic syndrome? If untreated, metabolic syndrome increases your risk of heart disease, stroke, and diabetes  These conditions lead to life-threatening illness  How can I manage metabolic syndrome? · Maintain a healthy weight:  Weight loss helps lower cholesterol, triglycerides, blood pressure, and blood glucose levels  It can also raise HDL (good cholesterol)  Ask your healthcare provider how much you should weigh  Ask him to help you create a weight loss plan if you are overweight       · Eat a variety of healthy foods:  Healthy foods include fruits, vegetables, whole-grain breads, low-fat dairy products, beans, lean meats, and fish  Eat foods that are low in fat and sodium (salt)  A dietitian can help you plan healthy meals  · Exercise:  Ask your healthcare provider to help you create an exercise plan  Exercise can help lower your blood pressure, cholesterol, and blood sugar levels  Exercise can also help raise your HDL level and help you to lose weight  Get at least 30 minutes of exercise, 5 days each week  · Check your blood pressure as directed: You may be asked to keep a record of your blood pressure and bring it with you to follow-up visits  Ask your healthcare provider what your blood pressure should be and how to check it  · Limit alcohol:  Women should limit alcohol to 1 drink a day  Men should limit alcohol to 2 drinks a day  A drink of alcohol is 12 ounces of beer, 5 ounces of wine, or 1½ ounces of liquor  · Do not smoke: If you smoke, it is never too late to quit  Smoking further increases your risk for heart disease and stroke  Ask your healthcare provider for information if you need help quitting  When should I contact my healthcare provider? · You have more thirst or hunger than usual      · You urinate more frequently  · You have blurred vision  · You have questions or concerns about your condition or care  When should I seek immediate care or call 911? · Your blood pressure is higher than your healthcare provider told you it should be  · You have chest pain or discomfort that spreads to your arms, jaw, or back  · You have a severe headache or dizziness  · You have trouble thinking, speaking, or understanding others  CARE AGREEMENT:   You have the right to help plan your care  Learn about your health condition and how it may be treated  Discuss treatment options with your caregivers to decide what care you want to receive  You always have the right to refuse treatment  The above information is an  only   It is not intended as medical advice for individual conditions or treatments  Talk to your doctor, nurse or pharmacist before following any medical regimen to see if it is safe and effective for you  © 2017 [facility] [place] Information is for End User's use only and may not be sold, redistributed or otherwise used for commercial purposes  All illustrations and images included in CareNotes® are the copyrighted property of [employer]  or [employer]

## 2019-11-13 NOTE — PROGRESS NOTES
Wilton Amado#  MEW:[id number]  QMT:[id number]    XZX:[id number]  Adm Date: 10/22/2019 7651  9:26 AM   ATT PHY: Carolyn Ledesma, [facility]     The patient was seen after reviewing the chart and discussing the case with caring staff  Patient examined at bedside  Earlier today I received a phone call that patient spit it out but blood tinged sputum this morning  Patient is currently on omeprazole 20 mg daily  I ordered Protonix 40 mg daily by discontinuing omeprazole  Patient is scheduled for discharge today  Patient is medically cleared for discharge  Medication list has been reconciled  Scripts have been filled out  Objective     Vitals:    11/13/19 0729   BP: 134/65   Pulse: 63   Resp: 16   Temp: 98 8 °F (37 1 °C)   SpO2: 95%       General Appearance: Sleeping in no acute distress  HEENT: Normocephalic, atraumatic  PERRLA, EOMI, MMM  Heart: RRR, no murmurs  Normal S1 and S2   Lungs: CTA bilaterally with fair air entry  Assessment     Alicia krishna(n) 68y o  year old female with dementia with behavioral disturbance    Medical Clearance: Patient is medically cleared for discharge  Medication list has been reconciled  Scripts have been filled out      1  Alzheimer's dementia with behavioral disturbance   Patient's dementia seems to be advanced   Patient's PET CT of the brain on 10/15/2015 showed suspicion for Lewy body disease  Some of patient's symptoms including visual hallucinations and mild cogwheel rigidity a sign of parkinsonism supports that diagnosis  Patient is on low dose amantadine  There was also a suspicion for Frontotemporal Dementia with cerebral cortical atrophy   Currently patient is on Namenda 10 mg b i d  Patient is otherwise doing well with PT   2  Hypothyroidism   Levothyroxine  3  Cardiac with history of hypertension   Losartan 25mg BID    4  GERD   I will discontinue omeprazole 20 mg daily   I have started the patient on Protonix 40 mg daily  5  Anorexia/weight loss   Patient has lost 5 lb over the past 3 weeks   I have consulted Nutrition for evaluation and treatment  Continue Ensure nutritional supplements after the meals  6  Gait abnormality   PT/OT  7  DJD/osteoarthritis   Patient may get Tylenol on as needed basis  8  Vitamin-D deficiency  Vitamin-D bolus doses for 9 more weeks followed by vitamin D3 1000 units daily  9  Vitamin B12 deficiency  Monthly B12 injections

## 2019-11-13 NOTE — SOCIAL WORK
CM met with PT for PT check in  PT was anxious and not consolable   CM walked with PT in chair which seemed to help sooth PT

## 2019-11-14 LAB
ATRIAL RATE: 64 BPM
P AXIS: 38 DEGREES
PR INTERVAL: 140 MS
QRS AXIS: -47 DEGREES
QRSD INTERVAL: 76 MS
QT INTERVAL: 452 MS
QTC INTERVAL: 466 MS
T WAVE AXIS: 25 DEGREES
VENTRICULAR RATE: 64 BPM

## 2019-11-14 PROCEDURE — 93010 ELECTROCARDIOGRAM REPORT: CPT | Performed by: INTERNAL MEDICINE
